# Patient Record
Sex: MALE | Race: WHITE | Employment: OTHER | URBAN - METROPOLITAN AREA
[De-identification: names, ages, dates, MRNs, and addresses within clinical notes are randomized per-mention and may not be internally consistent; named-entity substitution may affect disease eponyms.]

---

## 2019-06-25 LAB
ALBUMIN: 4.1 G/DL (ref 3.4–5)
ALP BLD-CCNC: 77 U/L (ref 33–136)
ALT SERPL-CCNC: 36 U/L (ref 10–52)
ANION GAP SERPL CALCULATED.3IONS-SCNC: 11 MMOL/L (ref 10–20)
AST SERPL-CCNC: 32 U/L (ref 9–39)
BICARBONATE: 29 MMOL/L (ref 21–32)
BILIRUB SERPL-MCNC: 0.8 MG/DL (ref 0–1.2)
CALCIUM SERPL-MCNC: 9.3 MG/DL (ref 8.6–10.3)
CHLORIDE BLD-SCNC: 104 MMOL/L (ref 98–107)
CHOLESTEROL/HDL RATIO: 2.5
CHOLESTEROL: 125 MG/DL (ref 0–199)
CREAT SERPL-MCNC: 1.26 MG/DL (ref 0.5–1.3)
GFR AFRICAN AMERICAN: 68 ML/MIN/1.73M2
GFR NON-AFRICAN AMERICAN: 56 ML/MIN/1.73M2
GLUCOSE: 109 MG/DL (ref 74–99)
HDLC SERPL-MCNC: 50 MG/DL
LDL CHOLESTEROL: 50 MG/DL (ref 0–99)
POTASSIUM SERPL-SCNC: 4.7 MMOL/L (ref 3.5–5.3)
SODIUM BLD-SCNC: 139 MMOL/L (ref 136–145)
TOTAL PROTEIN: 6.3 G/DL (ref 6.4–8.2)
TRIGL SERPL-MCNC: 127 MG/DL (ref 0–149)
UREA NITROGEN: 20 MG/DL (ref 6–23)
VLDLC SERPL CALC-MCNC: 25 MG/DL (ref 0–40)

## 2019-08-14 ENCOUNTER — OFFICE VISIT (OUTPATIENT)
Dept: FAMILY MEDICINE CLINIC | Age: 74
End: 2019-08-14
Payer: MEDICARE

## 2019-08-14 VITALS
SYSTOLIC BLOOD PRESSURE: 138 MMHG | RESPIRATION RATE: 16 BRPM | HEART RATE: 60 BPM | TEMPERATURE: 98.1 F | HEIGHT: 64 IN | BODY MASS INDEX: 34.15 KG/M2 | DIASTOLIC BLOOD PRESSURE: 78 MMHG | OXYGEN SATURATION: 98 % | WEIGHT: 200 LBS

## 2019-08-14 DIAGNOSIS — Z87.891 H/O NICOTINE DEPENDENCE: Chronic | ICD-10-CM

## 2019-08-14 DIAGNOSIS — I10 ESSENTIAL HYPERTENSION: Chronic | ICD-10-CM

## 2019-08-14 DIAGNOSIS — K22.719 BARRETT'S ESOPHAGUS WITH DYSPLASIA: Chronic | ICD-10-CM

## 2019-08-14 DIAGNOSIS — G89.29 CHRONIC RIGHT HIP PAIN: Chronic | ICD-10-CM

## 2019-08-14 DIAGNOSIS — F10.10 ALCOHOL USE DISORDER, MILD, ABUSE: Chronic | ICD-10-CM

## 2019-08-14 DIAGNOSIS — R74.8 ABNORMAL TRANSAMINASES: ICD-10-CM

## 2019-08-14 DIAGNOSIS — M25.551 CHRONIC RIGHT HIP PAIN: Chronic | ICD-10-CM

## 2019-08-14 DIAGNOSIS — Z85.46 H/O PROSTATE CANCER: Chronic | ICD-10-CM

## 2019-08-14 DIAGNOSIS — I25.10 CORONARY ARTERY DISEASE INVOLVING NATIVE CORONARY ARTERY OF NATIVE HEART WITHOUT ANGINA PECTORIS: Primary | Chronic | ICD-10-CM

## 2019-08-14 PROBLEM — K22.70 BARRETT ESOPHAGUS: Chronic | Status: ACTIVE | Noted: 2019-08-14

## 2019-08-14 PROCEDURE — 99214 OFFICE O/P EST MOD 30 MIN: CPT | Performed by: FAMILY MEDICINE

## 2019-08-14 RX ORDER — NITROGLYCERIN 0.4 MG/1
0.4 TABLET SUBLINGUAL EVERY 5 MIN PRN
COMMUNITY

## 2019-08-14 RX ORDER — LIDOCAINE 50 MG/G
1 PATCH TOPICAL DAILY
Qty: 90 PATCH | Refills: 1 | Status: SHIPPED | OUTPATIENT
Start: 2019-08-14 | End: 2020-08-07

## 2019-08-14 RX ORDER — LIDOCAINE 50 MG/G
1 PATCH TOPICAL DAILY
COMMUNITY
End: 2019-08-14 | Stop reason: SDUPTHER

## 2019-08-14 RX ORDER — CLOPIDOGREL BISULFATE 75 MG/1
75 TABLET ORAL DAILY
COMMUNITY
Start: 2019-07-23 | End: 2022-01-26

## 2019-08-14 RX ORDER — ATENOLOL 50 MG/1
50 TABLET ORAL NIGHTLY
COMMUNITY
Start: 2019-06-04 | End: 2022-06-20 | Stop reason: SDUPTHER

## 2019-08-14 RX ORDER — ROSUVASTATIN CALCIUM 20 MG/1
20 TABLET, COATED ORAL NIGHTLY
COMMUNITY
Start: 2019-07-08 | End: 2022-06-20 | Stop reason: SDUPTHER

## 2019-08-14 RX ORDER — CALCIUM CARBONATE/VITAMIN D3 600 MG-10
TABLET ORAL
COMMUNITY
End: 2019-08-14 | Stop reason: ALTCHOICE

## 2019-08-14 RX ORDER — BIOTIN 10 MG
TABLET ORAL DAILY
COMMUNITY
End: 2022-01-26

## 2019-08-14 RX ORDER — ISOSORBIDE MONONITRATE 60 MG/1
60 TABLET, EXTENDED RELEASE ORAL DAILY
Status: ON HOLD | COMMUNITY
Start: 2019-06-20 | End: 2019-12-02 | Stop reason: SDUPTHER

## 2019-08-14 RX ORDER — MULTIVITAMIN
TABLET ORAL
COMMUNITY
End: 2019-08-14 | Stop reason: ALTCHOICE

## 2019-08-14 RX ORDER — MELATONIN
DAILY
COMMUNITY

## 2019-08-14 RX ORDER — OMEPRAZOLE 20 MG/1
20 CAPSULE, DELAYED RELEASE ORAL DAILY
COMMUNITY
Start: 2019-07-08 | End: 2020-02-13 | Stop reason: ALTCHOICE

## 2019-08-14 NOTE — PROGRESS NOTES
Subjective  Piyush Bruce, 69 y.o. male presents today with:  Chief Complaint   Patient presents with   Lazara Petersen     Former Dr Nino Hernandez.  Hypertension     denies chest pain and headache.  Gastroesophageal Reflux    Hip Pain     right. pt states chronic hip pain. uses lidocaine patches and requesting refills. HPI    This is a new patient to me. I have reviewed the past medical and surgical history, social history and family history provided. I have reviewed  medication, previous testing and working diagnoses. I have reviewed the allergies and health maintenance information and correlated it into my decision making for this patient for care, diagnostics, consultations and treatment for today's visit. Gets his care one a year at the [facility] and sees PCP once a year. Also has cardiologist.    Patient is here for f/u HTN and CAD Has 8 stents, 7 of which are in the RCA. Is compliant with meds and has no side effects from them. Avoids added salt. Tries to eat healthy. Exercises occasionally. Has no chest pain, shortness of breath, palpitations or edema. Is on atenolol. Had been on Lisinopril which he stopped because he believed he was on too many medications. Did not have SEs from it. WIfe notes that his balance is off when he first gets up from a chair or the bed. No dizziness. Prostate CA s/p prostatectomy 2003. PSA is increasing and he is back on hormone therapy. Currently asx. Shaw's esophagus. On daily PPI. Due for EGD and colonoscopy in 4 years. Last EGD was within the last year. Sees Dr. Altagracia Bethea regularly. Chronic right hip pain related to possible OA. Uses lidocaine patch. Drinks 12 beers and shots of liquor on a weekend day frequently. Also drinks but less during the week. Transaminases mildly elevated in June      No other questions and or concerns for today's visit      Review of Systems No fevers, chills, sweats. No unintended weight loss.   No abdominal pain, Vomiting     Current Outpatient Medications   Medication Sig Dispense Refill    atenolol (TENORMIN) 50 MG tablet       clopidogrel (PLAVIX) 75 MG tablet       isosorbide mononitrate (IMDUR) 60 MG extended release tablet       omeprazole (PRILOSEC) 20 MG delayed release capsule       rosuvastatin (CRESTOR) 20 MG tablet       nitroGLYCERIN (NITROSTAT) 0.4 MG SL tablet Place 0.4 mg under the tongue every 5 minutes as needed for Chest pain up to max of 3 total doses. If no relief after 1 dose, call 911.  aspirin 81 MG tablet Take 81 mg by mouth daily      lidocaine (LIDODERM) 5 % Place 1 patch onto the skin daily 12 hours on, 12 hours off. 90 patch 1    Cholecalciferol (VITAMIN D3) 1000 units TABS       FIBER COMPLETE PO       Cyanocobalamin (VITAMIN B-12) 3000 MCG SUBL Place under the tongue       No current facility-administered medications for this visit. PMH, Surgical Hx, Family Hx, and Social Hxreviewed and updated. Health Maintenance reviewed. Objective    Vitals:    08/14/19 0715   BP: 138/78   Pulse: 60   Resp: 16   Temp: 98.1 °F (36.7 °C)   SpO2: 98%   Weight: 200 lb (90.7 kg)   Height: 5' 4\" (1.626 m)        Physical Exam   Constitutional: He is oriented to person, place, and time. He appears well-developed and well-nourished. HENT:   Head: Normocephalic and atraumatic. Eyes: Conjunctivae are normal. No scleral icterus. Neck: Normal range of motion. Neck supple. Carotid bruit is not present. No thyromegaly present. Cardiovascular: Normal rate, regular rhythm, S1 normal, S2 normal and normal heart sounds. Pulmonary/Chest: Effort normal and breath sounds normal. He has no wheezes. He has no rales. Abdominal: Soft. Bowel sounds are normal. He exhibits no distension and no mass. There is no tenderness. There is no rebound and no guarding. Musculoskeletal: He exhibits no edema. Neurological: He is alert and oriented to person, place, and time. Skin: Skin is warm and dry. Psychiatric: He has a normal mood and affect. No results found for: LABA1C  Lab Results   Component Value Date    CREATININE 1.26 06/25/2019     Lab Results   Component Value Date    ALT 36 06/25/2019    AST 32 06/25/2019     Lab Results   Component Value Date    CHOL 125 06/25/2019    TRIG 127 06/25/2019    HDL 50.0 06/25/2019    LDLCALC 55 08/21/2012        Assessment & Plan   Visit Diagnoses and Associated Orders     Coronary artery disease involving native coronary artery of native heart without angina pectoris    -  Primary    Stable. Followed by Dr. Ilia Castro.  Encouraged patient to maintain compliance with medications and appointments. Chronic right hip pain        PRN lidocaine patches    lidocaine (LIDODERM) 5 % [57910]           H/O prostate cancer        PSA increasing. On hormonal therapy again. Followed by urology. Essential hypertension        Stable, well-controlled on current meds. Consider changing atenolol to lisinopril. TSH Without Reflex [[id number] Custom]   - Future Order         Shaw's esophagus with dysplasia        Patient has routine follow-up with Dr. Emilio Bell. Continue daily PPI         Abnormal transaminases        Normal in February elevated in June. Acknowledges significant alcohol use on weekends and occasionally during the week. Comprehensive Metabolic Panel [[id number] Custom]   - Future Order         Alcohol use disorder, mild, abuse        Advised moderation of alcohol consumption.          H/O nicotine dependence        CT lung screen [Initial/Annual] [ Custom]   - Future Order         ORDERS WITHOUT AN ASSOCIATED DIAGNOSIS    atenolol (TENORMIN) 50 MG tablet [718]      clopidogrel (PLAVIX) 75 MG tablet [28720]      isosorbide mononitrate (IMDUR) 60 MG extended release tablet [72009]      omeprazole (PRILOSEC) 20 MG delayed release capsule [15482]      rosuvastatin (CRESTOR) 20 MG tablet [14561]      nitroGLYCERIN (NITROSTAT) 0.4 MG SL tablet [0204]

## 2019-08-22 ENCOUNTER — TELEPHONE (OUTPATIENT)
Dept: CASE MANAGEMENT | Age: 74
End: 2019-08-22

## 2019-08-26 ENCOUNTER — HOSPITAL ENCOUNTER (OUTPATIENT)
Dept: CT IMAGING | Age: 74
Discharge: HOME | End: 2019-08-28
Payer: MEDICARE

## 2019-08-26 VITALS — BODY MASS INDEX: 33.29 KG/M2 | HEIGHT: 64 IN | WEIGHT: 195 LBS

## 2019-08-26 DIAGNOSIS — Z87.891 H/O NICOTINE DEPENDENCE: Chronic | ICD-10-CM

## 2019-08-26 PROCEDURE — G0297 LDCT FOR LUNG CA SCREEN: HCPCS

## 2019-10-07 ENCOUNTER — OFFICE VISIT (OUTPATIENT)
Dept: FAMILY MEDICINE CLINIC | Age: 74
End: 2019-10-07
Payer: MEDICARE

## 2019-10-07 VITALS
WEIGHT: 205 LBS | DIASTOLIC BLOOD PRESSURE: 84 MMHG | HEART RATE: 71 BPM | TEMPERATURE: 98.6 F | RESPIRATION RATE: 16 BRPM | OXYGEN SATURATION: 97 % | SYSTOLIC BLOOD PRESSURE: 136 MMHG | HEIGHT: 64 IN | BODY MASS INDEX: 35 KG/M2

## 2019-10-07 DIAGNOSIS — G47.62 NOCTURNAL LEG CRAMPS: Primary | ICD-10-CM

## 2019-10-07 DIAGNOSIS — G47.62 NOCTURNAL LEG CRAMPS: ICD-10-CM

## 2019-10-07 LAB
ANION GAP SERPL CALCULATED.3IONS-SCNC: 10 MEQ/L (ref 9–15)
BUN BLDV-MCNC: 21 MG/DL (ref 8–23)
CALCIUM SERPL-MCNC: 9.6 MG/DL (ref 8.5–9.9)
CHLORIDE BLD-SCNC: 104 MEQ/L (ref 95–107)
CO2: 29 MEQ/L (ref 20–31)
CREAT SERPL-MCNC: 1.19 MG/DL (ref 0.7–1.2)
GFR AFRICAN AMERICAN: >60
GFR NON-AFRICAN AMERICAN: 60
GLUCOSE BLD-MCNC: 109 MG/DL (ref 70–99)
MAGNESIUM: 2.1 MG/DL (ref 1.7–2.4)
POTASSIUM SERPL-SCNC: 4.3 MEQ/L (ref 3.4–4.9)
SODIUM BLD-SCNC: 143 MEQ/L (ref 135–144)

## 2019-10-07 PROCEDURE — 1123F ACP DISCUSS/DSCN MKR DOCD: CPT | Performed by: FAMILY MEDICINE

## 2019-10-07 PROCEDURE — G8598 ASA/ANTIPLAT THER USED: HCPCS | Performed by: FAMILY MEDICINE

## 2019-10-07 PROCEDURE — 99213 OFFICE O/P EST LOW 20 MIN: CPT | Performed by: FAMILY MEDICINE

## 2019-10-07 PROCEDURE — G8427 DOCREV CUR MEDS BY ELIG CLIN: HCPCS | Performed by: FAMILY MEDICINE

## 2019-10-07 PROCEDURE — 1036F TOBACCO NON-USER: CPT | Performed by: FAMILY MEDICINE

## 2019-10-07 PROCEDURE — G0008 ADMIN INFLUENZA VIRUS VAC: HCPCS | Performed by: FAMILY MEDICINE

## 2019-10-07 PROCEDURE — 90653 IIV ADJUVANT VACCINE IM: CPT | Performed by: FAMILY MEDICINE

## 2019-10-07 PROCEDURE — 3017F COLORECTAL CA SCREEN DOC REV: CPT | Performed by: FAMILY MEDICINE

## 2019-10-07 PROCEDURE — G8482 FLU IMMUNIZE ORDER/ADMIN: HCPCS | Performed by: FAMILY MEDICINE

## 2019-10-07 PROCEDURE — 4040F PNEUMOC VAC/ADMIN/RCVD: CPT | Performed by: FAMILY MEDICINE

## 2019-10-07 PROCEDURE — G8417 CALC BMI ABV UP PARAM F/U: HCPCS | Performed by: FAMILY MEDICINE

## 2019-10-07 ASSESSMENT — PATIENT HEALTH QUESTIONNAIRE - PHQ9
SUM OF ALL RESPONSES TO PHQ QUESTIONS 1-9: 0
2. FEELING DOWN, DEPRESSED OR HOPELESS: 0
SUM OF ALL RESPONSES TO PHQ9 QUESTIONS 1 & 2: 0
SUM OF ALL RESPONSES TO PHQ QUESTIONS 1-9: 0
1. LITTLE INTEREST OR PLEASURE IN DOING THINGS: 0

## 2019-12-01 ENCOUNTER — HOSPITAL ENCOUNTER (OUTPATIENT)
Age: 74
Setting detail: OBSERVATION
Discharge: HOME | End: 2019-12-03
Attending: EMERGENCY MEDICINE | Admitting: INTERNAL MEDICINE
Payer: MEDICARE

## 2019-12-01 ENCOUNTER — APPOINTMENT (OUTPATIENT)
Dept: GENERAL RADIOLOGY | Age: 74
End: 2019-12-01
Payer: MEDICARE

## 2019-12-01 DIAGNOSIS — R07.9 CHEST PAIN, UNSPECIFIED TYPE: Primary | ICD-10-CM

## 2019-12-01 LAB
ALBUMIN SERPL-MCNC: 4.2 G/DL (ref 3.5–4.6)
ALP BLD-CCNC: 75 U/L (ref 35–104)
ALT SERPL-CCNC: 44 U/L (ref 0–41)
ANION GAP SERPL CALCULATED.3IONS-SCNC: 9 MEQ/L (ref 9–15)
AST SERPL-CCNC: 37 U/L (ref 0–40)
BASOPHILS ABSOLUTE: 0.1 K/UL (ref 0–0.2)
BASOPHILS RELATIVE PERCENT: 1.1 %
BILIRUB SERPL-MCNC: 0.6 MG/DL (ref 0.2–0.7)
BUN BLDV-MCNC: 19 MG/DL (ref 8–23)
CALCIUM SERPL-MCNC: 9.3 MG/DL (ref 8.5–9.9)
CHLORIDE BLD-SCNC: 102 MEQ/L (ref 95–107)
CO2: 28 MEQ/L (ref 20–31)
CREAT SERPL-MCNC: 1.2 MG/DL (ref 0.7–1.2)
EOSINOPHILS ABSOLUTE: 0.1 K/UL (ref 0–0.7)
EOSINOPHILS RELATIVE PERCENT: 1.7 %
GFR AFRICAN AMERICAN: >60
GFR NON-AFRICAN AMERICAN: 59.4
GLOBULIN: 2.1 G/DL (ref 2.3–3.5)
GLUCOSE BLD-MCNC: 110 MG/DL (ref 70–99)
HCT VFR BLD CALC: 40.4 % (ref 42–52)
HEMOGLOBIN: 14 G/DL (ref 14–18)
LYMPHOCYTES ABSOLUTE: 0.8 K/UL (ref 1–4.8)
LYMPHOCYTES RELATIVE PERCENT: 16.7 %
MAGNESIUM: 2.1 MG/DL (ref 1.7–2.4)
MCH RBC QN AUTO: 31.5 PG (ref 27–31.3)
MCHC RBC AUTO-ENTMCNC: 34.6 % (ref 33–37)
MCV RBC AUTO: 90.8 FL (ref 80–100)
MONOCYTES ABSOLUTE: 0.4 K/UL (ref 0.2–0.8)
MONOCYTES RELATIVE PERCENT: 9.8 %
NEUTROPHILS ABSOLUTE: 3.2 K/UL (ref 1.4–6.5)
NEUTROPHILS RELATIVE PERCENT: 70.7 %
PDW BLD-RTO: 13.4 % (ref 11.5–14.5)
PLATELET # BLD: 115 K/UL (ref 130–400)
POTASSIUM SERPL-SCNC: 4.3 MEQ/L (ref 3.4–4.9)
RBC # BLD: 4.44 M/UL (ref 4.7–6.1)
SODIUM BLD-SCNC: 139 MEQ/L (ref 135–144)
TOTAL PROTEIN: 6.3 G/DL (ref 6.3–8)
TROPONIN: <0.01 NG/ML (ref 0–0.01)
WBC # BLD: 4.6 K/UL (ref 4.8–10.8)

## 2019-12-01 PROCEDURE — 96372 THER/PROPH/DIAG INJ SC/IM: CPT

## 2019-12-01 PROCEDURE — 6370000000 HC RX 637 (ALT 250 FOR IP): Performed by: INTERNAL MEDICINE

## 2019-12-01 PROCEDURE — 36415 COLL VENOUS BLD VENIPUNCTURE: CPT

## 2019-12-01 PROCEDURE — 2580000003 HC RX 258: Performed by: EMERGENCY MEDICINE

## 2019-12-01 PROCEDURE — 6360000002 HC RX W HCPCS: Performed by: NURSE PRACTITIONER

## 2019-12-01 PROCEDURE — 85025 COMPLETE CBC W/AUTO DIFF WBC: CPT

## 2019-12-01 PROCEDURE — 83735 ASSAY OF MAGNESIUM: CPT

## 2019-12-01 PROCEDURE — 2580000003 HC RX 258: Performed by: NURSE PRACTITIONER

## 2019-12-01 PROCEDURE — 93005 ELECTROCARDIOGRAM TRACING: CPT | Performed by: EMERGENCY MEDICINE

## 2019-12-01 PROCEDURE — 99285 EMERGENCY DEPT VISIT HI MDM: CPT

## 2019-12-01 PROCEDURE — 80053 COMPREHEN METABOLIC PANEL: CPT

## 2019-12-01 PROCEDURE — 71046 X-RAY EXAM CHEST 2 VIEWS: CPT

## 2019-12-01 PROCEDURE — G0378 HOSPITAL OBSERVATION PER HR: HCPCS

## 2019-12-01 PROCEDURE — 84484 ASSAY OF TROPONIN QUANT: CPT

## 2019-12-01 PROCEDURE — 6370000000 HC RX 637 (ALT 250 FOR IP): Performed by: NURSE PRACTITIONER

## 2019-12-01 RX ORDER — NITROGLYCERIN 0.4 MG/1
0.4 TABLET SUBLINGUAL EVERY 5 MIN PRN
Status: DISCONTINUED | OUTPATIENT
Start: 2019-12-01 | End: 2019-12-03 | Stop reason: HOSPADM

## 2019-12-01 RX ORDER — LORAZEPAM 2 MG/ML
2 INJECTION INTRAMUSCULAR
Status: DISCONTINUED | OUTPATIENT
Start: 2019-12-01 | End: 2019-12-01

## 2019-12-01 RX ORDER — SODIUM CHLORIDE 0.9 % (FLUSH) 0.9 %
10 SYRINGE (ML) INJECTION PRN
Status: DISCONTINUED | OUTPATIENT
Start: 2019-12-01 | End: 2019-12-03 | Stop reason: HOSPADM

## 2019-12-01 RX ORDER — LORAZEPAM 1 MG/1
3 TABLET ORAL
Status: DISCONTINUED | OUTPATIENT
Start: 2019-12-01 | End: 2019-12-01

## 2019-12-01 RX ORDER — LORAZEPAM 2 MG/ML
4 INJECTION INTRAMUSCULAR
Status: DISCONTINUED | OUTPATIENT
Start: 2019-12-01 | End: 2019-12-01

## 2019-12-01 RX ORDER — LORAZEPAM 1 MG/1
2 TABLET ORAL
Status: DISCONTINUED | OUTPATIENT
Start: 2019-12-01 | End: 2019-12-01

## 2019-12-01 RX ORDER — ROSUVASTATIN CALCIUM 20 MG/1
20 TABLET, COATED ORAL NIGHTLY
Status: DISCONTINUED | OUTPATIENT
Start: 2019-12-01 | End: 2019-12-03 | Stop reason: HOSPADM

## 2019-12-01 RX ORDER — LORAZEPAM 1 MG/1
1 TABLET ORAL
Status: DISCONTINUED | OUTPATIENT
Start: 2019-12-01 | End: 2019-12-01

## 2019-12-01 RX ORDER — ISOSORBIDE MONONITRATE 60 MG/1
60 TABLET, EXTENDED RELEASE ORAL DAILY
Status: DISCONTINUED | OUTPATIENT
Start: 2019-12-01 | End: 2019-12-02

## 2019-12-01 RX ORDER — LORAZEPAM 2 MG/ML
1 INJECTION INTRAMUSCULAR
Status: DISCONTINUED | OUTPATIENT
Start: 2019-12-01 | End: 2019-12-01

## 2019-12-01 RX ORDER — PANTOPRAZOLE SODIUM 40 MG/1
40 TABLET, DELAYED RELEASE ORAL
Status: DISCONTINUED | OUTPATIENT
Start: 2019-12-02 | End: 2019-12-03 | Stop reason: HOSPADM

## 2019-12-01 RX ORDER — SODIUM CHLORIDE 0.9 % (FLUSH) 0.9 %
10 SYRINGE (ML) INJECTION EVERY 12 HOURS SCHEDULED
Status: DISCONTINUED | OUTPATIENT
Start: 2019-12-01 | End: 2019-12-03 | Stop reason: HOSPADM

## 2019-12-01 RX ORDER — LORAZEPAM 1 MG/1
4 TABLET ORAL
Status: DISCONTINUED | OUTPATIENT
Start: 2019-12-01 | End: 2019-12-01

## 2019-12-01 RX ORDER — 0.9 % SODIUM CHLORIDE 0.9 %
1000 INTRAVENOUS SOLUTION INTRAVENOUS ONCE
Status: COMPLETED | OUTPATIENT
Start: 2019-12-01 | End: 2019-12-01

## 2019-12-01 RX ORDER — CLOPIDOGREL BISULFATE 75 MG/1
75 TABLET ORAL DAILY
Status: DISCONTINUED | OUTPATIENT
Start: 2019-12-01 | End: 2019-12-03 | Stop reason: HOSPADM

## 2019-12-01 RX ORDER — LORAZEPAM 2 MG/ML
3 INJECTION INTRAMUSCULAR
Status: DISCONTINUED | OUTPATIENT
Start: 2019-12-01 | End: 2019-12-01

## 2019-12-01 RX ORDER — ASPIRIN 81 MG/1
81 TABLET, CHEWABLE ORAL DAILY
Status: DISCONTINUED | OUTPATIENT
Start: 2019-12-02 | End: 2019-12-02 | Stop reason: SDUPTHER

## 2019-12-01 RX ORDER — ATORVASTATIN CALCIUM 40 MG/1
40 TABLET, FILM COATED ORAL NIGHTLY
Status: DISCONTINUED | OUTPATIENT
Start: 2019-12-01 | End: 2019-12-01 | Stop reason: SDUPTHER

## 2019-12-01 RX ADMIN — SODIUM CHLORIDE 1000 ML: 9 INJECTION, SOLUTION INTRAVENOUS at 10:07

## 2019-12-01 RX ADMIN — NITROGLYCERIN 1 INCH: 20 OINTMENT TOPICAL at 20:28

## 2019-12-01 RX ADMIN — ENOXAPARIN SODIUM 40 MG: 40 INJECTION SUBCUTANEOUS at 13:56

## 2019-12-01 RX ADMIN — Medication 10 ML: at 13:57

## 2019-12-01 RX ADMIN — ROSUVASTATIN CALCIUM 20 MG: 20 TABLET, FILM COATED ORAL at 20:28

## 2019-12-01 RX ADMIN — Medication 10 ML: at 20:30

## 2019-12-01 ASSESSMENT — ENCOUNTER SYMPTOMS
COUGH: 0
DIARRHEA: 0
VOMITING: 0
SHORTNESS OF BREATH: 0
ABDOMINAL PAIN: 0
NAUSEA: 1
BACK PAIN: 0
SORE THROAT: 0

## 2019-12-01 ASSESSMENT — PAIN SCALES - GENERAL
PAINLEVEL_OUTOF10: 0

## 2019-12-02 ENCOUNTER — TELEPHONE (OUTPATIENT)
Dept: FAMILY MEDICINE CLINIC | Age: 74
End: 2019-12-02

## 2019-12-02 ENCOUNTER — APPOINTMENT (OUTPATIENT)
Dept: CARDIAC CATH/INVASIVE PROCEDURES | Age: 74
End: 2019-12-02
Payer: MEDICARE

## 2019-12-02 PROBLEM — I20.0 UNSTABLE ANGINA (HCC): Status: ACTIVE | Noted: 2019-12-02

## 2019-12-02 LAB
ALBUMIN SERPL-MCNC: 4.3 G/DL (ref 3.5–4.6)
ALP BLD-CCNC: 75 U/L (ref 35–104)
ALT SERPL-CCNC: 48 U/L (ref 0–41)
ANION GAP SERPL CALCULATED.3IONS-SCNC: 13 MEQ/L (ref 9–15)
ANISOCYTOSIS: ABNORMAL
AST SERPL-CCNC: 44 U/L (ref 0–40)
ATYPICAL LYMPHOCYTE RELATIVE PERCENT: 4 %
BANDED NEUTROPHILS RELATIVE PERCENT: 4 %
BASOPHILS ABSOLUTE: 0 K/UL (ref 0–0.2)
BASOPHILS RELATIVE PERCENT: 1.5 %
BILIRUB SERPL-MCNC: 0.7 MG/DL (ref 0.2–0.7)
BUN BLDV-MCNC: 17 MG/DL (ref 8–23)
CALCIUM SERPL-MCNC: 9.7 MG/DL (ref 8.5–9.9)
CHLORIDE BLD-SCNC: 105 MEQ/L (ref 95–107)
CO2: 23 MEQ/L (ref 20–31)
CREAT SERPL-MCNC: 0.96 MG/DL (ref 0.7–1.2)
EKG ATRIAL RATE: 58 BPM
EKG ATRIAL RATE: 81 BPM
EKG P AXIS: 34 DEGREES
EKG P AXIS: 44 DEGREES
EKG P-R INTERVAL: 134 MS
EKG P-R INTERVAL: 144 MS
EKG Q-T INTERVAL: 434 MS
EKG Q-T INTERVAL: 438 MS
EKG QRS DURATION: 88 MS
EKG QRS DURATION: 92 MS
EKG QTC CALCULATION (BAZETT): 429 MS
EKG QTC CALCULATION (BAZETT): 504 MS
EKG R AXIS: -3 DEGREES
EKG R AXIS: -5 DEGREES
EKG T AXIS: 20 DEGREES
EKG T AXIS: 40 DEGREES
EKG VENTRICULAR RATE: 58 BPM
EKG VENTRICULAR RATE: 81 BPM
EOSINOPHILS ABSOLUTE: 0.1 K/UL (ref 0–0.7)
EOSINOPHILS RELATIVE PERCENT: 3 %
GFR AFRICAN AMERICAN: >60
GFR NON-AFRICAN AMERICAN: >60
GLOBULIN: 2.2 G/DL (ref 2.3–3.5)
GLUCOSE BLD-MCNC: 117 MG/DL (ref 70–99)
HCT VFR BLD CALC: 43 % (ref 42–52)
HEMOGLOBIN: 14.8 G/DL (ref 14–18)
LYMPHOCYTES ABSOLUTE: 0.8 K/UL (ref 1–4.8)
LYMPHOCYTES RELATIVE PERCENT: 19 %
MCH RBC QN AUTO: 31.5 PG (ref 27–31.3)
MCHC RBC AUTO-ENTMCNC: 34.3 % (ref 33–37)
MCV RBC AUTO: 92 FL (ref 80–100)
METAMYELOCYTES RELATIVE PERCENT: 1 %
MONOCYTES ABSOLUTE: 0.1 K/UL (ref 0.2–0.8)
MONOCYTES RELATIVE PERCENT: 3.9 %
NEUTROPHILS ABSOLUTE: 2.4 K/UL (ref 1.4–6.5)
NEUTROPHILS RELATIVE PERCENT: 65 %
PDW BLD-RTO: 13.4 % (ref 11.5–14.5)
PLATELET # BLD: 107 K/UL (ref 130–400)
PLATELET SLIDE REVIEW: ABNORMAL
POTASSIUM REFLEX MAGNESIUM: 4 MEQ/L (ref 3.4–4.9)
RBC # BLD: 4.68 M/UL (ref 4.7–6.1)
SLIDE REVIEW: ABNORMAL
SODIUM BLD-SCNC: 141 MEQ/L (ref 135–144)
TOTAL PROTEIN: 6.5 G/DL (ref 6.3–8)
WBC # BLD: 3.4 K/UL (ref 4.8–10.8)

## 2019-12-02 PROCEDURE — 96372 THER/PROPH/DIAG INJ SC/IM: CPT

## 2019-12-02 PROCEDURE — 80053 COMPREHEN METABOLIC PANEL: CPT

## 2019-12-02 PROCEDURE — G0378 HOSPITAL OBSERVATION PER HR: HCPCS

## 2019-12-02 PROCEDURE — 6370000000 HC RX 637 (ALT 250 FOR IP): Performed by: INTERNAL MEDICINE

## 2019-12-02 PROCEDURE — 6360000002 HC RX W HCPCS: Performed by: INTERNAL MEDICINE

## 2019-12-02 PROCEDURE — 93005 ELECTROCARDIOGRAM TRACING: CPT | Performed by: NURSE PRACTITIONER

## 2019-12-02 PROCEDURE — 6360000002 HC RX W HCPCS

## 2019-12-02 PROCEDURE — 6370000000 HC RX 637 (ALT 250 FOR IP): Performed by: NURSE PRACTITIONER

## 2019-12-02 PROCEDURE — 96374 THER/PROPH/DIAG INJ IV PUSH: CPT

## 2019-12-02 PROCEDURE — 6360000004 HC RX CONTRAST MEDICATION: Performed by: INTERNAL MEDICINE

## 2019-12-02 PROCEDURE — 36415 COLL VENOUS BLD VENIPUNCTURE: CPT

## 2019-12-02 PROCEDURE — C1760 CLOSURE DEV, VASC: HCPCS

## 2019-12-02 PROCEDURE — 2580000003 HC RX 258: Performed by: INTERNAL MEDICINE

## 2019-12-02 PROCEDURE — 2709999900 HC NON-CHARGEABLE SUPPLY

## 2019-12-02 PROCEDURE — C1769 GUIDE WIRE: HCPCS

## 2019-12-02 PROCEDURE — 93458 L HRT ARTERY/VENTRICLE ANGIO: CPT | Performed by: INTERNAL MEDICINE

## 2019-12-02 PROCEDURE — 96375 TX/PRO/DX INJ NEW DRUG ADDON: CPT

## 2019-12-02 PROCEDURE — 2500000003 HC RX 250 WO HCPCS

## 2019-12-02 PROCEDURE — 2580000003 HC RX 258

## 2019-12-02 PROCEDURE — 2580000003 HC RX 258: Performed by: NURSE PRACTITIONER

## 2019-12-02 PROCEDURE — 85025 COMPLETE CBC W/AUTO DIFF WBC: CPT

## 2019-12-02 PROCEDURE — 6360000002 HC RX W HCPCS: Performed by: NURSE PRACTITIONER

## 2019-12-02 PROCEDURE — C1894 INTRO/SHEATH, NON-LASER: HCPCS

## 2019-12-02 RX ORDER — PREDNISONE 50 MG/1
50 TABLET ORAL ONCE
Status: DISCONTINUED | OUTPATIENT
Start: 2019-12-02 | End: 2019-12-03 | Stop reason: HOSPADM

## 2019-12-02 RX ORDER — SODIUM CHLORIDE 0.9 % (FLUSH) 0.9 %
10 SYRINGE (ML) INJECTION PRN
Status: DISCONTINUED | OUTPATIENT
Start: 2019-12-02 | End: 2019-12-03 | Stop reason: HOSPADM

## 2019-12-02 RX ORDER — ISOSORBIDE MONONITRATE 60 MG/1
120 TABLET, EXTENDED RELEASE ORAL DAILY
Status: DISCONTINUED | OUTPATIENT
Start: 2019-12-03 | End: 2019-12-03 | Stop reason: HOSPADM

## 2019-12-02 RX ORDER — HYDRALAZINE HYDROCHLORIDE 50 MG/1
50 TABLET, FILM COATED ORAL ONCE
Status: COMPLETED | OUTPATIENT
Start: 2019-12-02 | End: 2019-12-02

## 2019-12-02 RX ORDER — HYDRALAZINE HYDROCHLORIDE 20 MG/ML
10 INJECTION INTRAMUSCULAR; INTRAVENOUS EVERY 4 HOURS PRN
Status: DISCONTINUED | OUTPATIENT
Start: 2019-12-02 | End: 2019-12-03 | Stop reason: HOSPADM

## 2019-12-02 RX ORDER — DIPHENHYDRAMINE HYDROCHLORIDE 50 MG/ML
50 INJECTION INTRAMUSCULAR; INTRAVENOUS ONCE
Status: COMPLETED | OUTPATIENT
Start: 2019-12-02 | End: 2019-12-02

## 2019-12-02 RX ORDER — ACETAMINOPHEN 325 MG/1
650 TABLET ORAL EVERY 4 HOURS PRN
Status: DISCONTINUED | OUTPATIENT
Start: 2019-12-02 | End: 2019-12-03 | Stop reason: HOSPADM

## 2019-12-02 RX ORDER — ASPIRIN 81 MG/1
81 TABLET, CHEWABLE ORAL DAILY
Status: DISCONTINUED | OUTPATIENT
Start: 2019-12-02 | End: 2019-12-03 | Stop reason: HOSPADM

## 2019-12-02 RX ORDER — SODIUM CHLORIDE 9 MG/ML
INJECTION, SOLUTION INTRAVENOUS CONTINUOUS
Status: DISCONTINUED | OUTPATIENT
Start: 2019-12-02 | End: 2019-12-03 | Stop reason: HOSPADM

## 2019-12-02 RX ORDER — SODIUM CHLORIDE 0.9 % (FLUSH) 0.9 %
10 SYRINGE (ML) INJECTION EVERY 12 HOURS SCHEDULED
Status: DISCONTINUED | OUTPATIENT
Start: 2019-12-02 | End: 2019-12-03 | Stop reason: HOSPADM

## 2019-12-02 RX ORDER — DIAZEPAM 5 MG/1
5 TABLET ORAL
Status: DISCONTINUED | OUTPATIENT
Start: 2019-12-02 | End: 2019-12-03 | Stop reason: HOSPADM

## 2019-12-02 RX ORDER — ATENOLOL 50 MG/1
50 TABLET ORAL NIGHTLY
Status: DISCONTINUED | OUTPATIENT
Start: 2019-12-02 | End: 2019-12-03 | Stop reason: HOSPADM

## 2019-12-02 RX ORDER — DIPHENHYDRAMINE HCL 25 MG
50 TABLET ORAL ONCE
Status: DISCONTINUED | OUTPATIENT
Start: 2019-12-02 | End: 2019-12-03 | Stop reason: HOSPADM

## 2019-12-02 RX ORDER — ACETAMINOPHEN 325 MG/1
650 TABLET ORAL EVERY 4 HOURS PRN
Status: DISCONTINUED | OUTPATIENT
Start: 2019-12-02 | End: 2019-12-02 | Stop reason: SDUPTHER

## 2019-12-02 RX ORDER — ISOSORBIDE MONONITRATE 60 MG/1
120 TABLET, EXTENDED RELEASE ORAL DAILY
Qty: 30 TABLET | Refills: 3 | Status: SHIPPED | OUTPATIENT
Start: 2019-12-02 | End: 2022-04-12 | Stop reason: SDUPTHER

## 2019-12-02 RX ORDER — ONDANSETRON 2 MG/ML
4 INJECTION INTRAMUSCULAR; INTRAVENOUS EVERY 6 HOURS PRN
Status: DISCONTINUED | OUTPATIENT
Start: 2019-12-02 | End: 2019-12-03 | Stop reason: HOSPADM

## 2019-12-02 RX ADMIN — SODIUM CHLORIDE: 9 INJECTION, SOLUTION INTRAVENOUS at 12:28

## 2019-12-02 RX ADMIN — CLOPIDOGREL BISULFATE 75 MG: 75 TABLET ORAL at 08:45

## 2019-12-02 RX ADMIN — ENOXAPARIN SODIUM 40 MG: 40 INJECTION SUBCUTANEOUS at 08:45

## 2019-12-02 RX ADMIN — NITROGLYCERIN 0.4 MG: 0.4 TABLET, ORALLY DISINTEGRATING SUBLINGUAL at 01:03

## 2019-12-02 RX ADMIN — ASPIRIN 81 MG 81 MG: 81 TABLET ORAL at 08:45

## 2019-12-02 RX ADMIN — ROSUVASTATIN CALCIUM 20 MG: 20 TABLET, FILM COATED ORAL at 20:26

## 2019-12-02 RX ADMIN — Medication 10 ML: at 08:45

## 2019-12-02 RX ADMIN — ACETAMINOPHEN 650 MG: 325 TABLET ORAL at 21:19

## 2019-12-02 RX ADMIN — Medication 10 ML: at 12:28

## 2019-12-02 RX ADMIN — ONDANSETRON 4 MG: 2 INJECTION INTRAMUSCULAR; INTRAVENOUS at 13:06

## 2019-12-02 RX ADMIN — PANTOPRAZOLE SODIUM 40 MG: 40 TABLET, DELAYED RELEASE ORAL at 08:47

## 2019-12-02 RX ADMIN — ACETAMINOPHEN 650 MG: 325 TABLET ORAL at 02:09

## 2019-12-02 RX ADMIN — DIPHENHYDRAMINE HYDROCHLORIDE 50 MG: 50 INJECTION, SOLUTION INTRAMUSCULAR; INTRAVENOUS at 13:11

## 2019-12-02 RX ADMIN — NITROGLYCERIN 1 INCH: 20 OINTMENT TOPICAL at 02:09

## 2019-12-02 RX ADMIN — NITROGLYCERIN 1 INCH: 20 OINTMENT TOPICAL at 08:45

## 2019-12-02 RX ADMIN — IOPAMIDOL 80 ML: 612 INJECTION, SOLUTION INTRAVENOUS at 14:00

## 2019-12-02 RX ADMIN — HYDROCORTISONE SODIUM SUCCINATE: 100 INJECTION, POWDER, FOR SOLUTION INTRAMUSCULAR; INTRAVENOUS at 13:30

## 2019-12-02 RX ADMIN — HYDRALAZINE HYDROCHLORIDE 10 MG: 20 INJECTION INTRAMUSCULAR; INTRAVENOUS at 00:24

## 2019-12-02 RX ADMIN — Medication 10 ML: at 21:20

## 2019-12-02 RX ADMIN — HYDRALAZINE HYDROCHLORIDE 50 MG: 50 TABLET, FILM COATED ORAL at 17:13

## 2019-12-02 RX ADMIN — ATENOLOL 50 MG: 50 TABLET ORAL at 17:13

## 2019-12-02 RX ADMIN — NITROGLYCERIN 0.4 MG: 0.4 TABLET, ORALLY DISINTEGRATING SUBLINGUAL at 01:10

## 2019-12-02 ASSESSMENT — ENCOUNTER SYMPTOMS
VOMITING: 0
DIARRHEA: 0
CHEST TIGHTNESS: 1
SHORTNESS OF BREATH: 1
TROUBLE SWALLOWING: 0
COLOR CHANGE: 0
SINUS PAIN: 0
NAUSEA: 0
SORE THROAT: 0
EYE DISCHARGE: 0
CHOKING: 1
ABDOMINAL DISTENTION: 0

## 2019-12-02 ASSESSMENT — PAIN SCALES - GENERAL
PAINLEVEL_OUTOF10: 2
PAINLEVEL_OUTOF10: 0
PAINLEVEL_OUTOF10: 7
PAINLEVEL_OUTOF10: 3
PAINLEVEL_OUTOF10: 0
PAINLEVEL_OUTOF10: 2
PAINLEVEL_OUTOF10: 1

## 2019-12-02 ASSESSMENT — PAIN DESCRIPTION - LOCATION: LOCATION: CHEST

## 2019-12-02 ASSESSMENT — PAIN DESCRIPTION - PAIN TYPE: TYPE: ACUTE PAIN

## 2019-12-02 ASSESSMENT — PAIN DESCRIPTION - DESCRIPTORS: DESCRIPTORS: PRESSURE;HEAVINESS

## 2019-12-03 VITALS
TEMPERATURE: 98.6 F | BODY MASS INDEX: 34.89 KG/M2 | DIASTOLIC BLOOD PRESSURE: 83 MMHG | WEIGHT: 204.4 LBS | RESPIRATION RATE: 18 BRPM | SYSTOLIC BLOOD PRESSURE: 172 MMHG | HEIGHT: 64 IN | HEART RATE: 63 BPM | OXYGEN SATURATION: 96 %

## 2019-12-03 PROCEDURE — 6370000000 HC RX 637 (ALT 250 FOR IP): Performed by: INTERNAL MEDICINE

## 2019-12-03 PROCEDURE — G0378 HOSPITAL OBSERVATION PER HR: HCPCS

## 2019-12-03 PROCEDURE — 2580000003 HC RX 258: Performed by: INTERNAL MEDICINE

## 2019-12-03 RX ADMIN — PANTOPRAZOLE SODIUM 40 MG: 40 TABLET, DELAYED RELEASE ORAL at 10:09

## 2019-12-03 RX ADMIN — ASPIRIN 81 MG 81 MG: 81 TABLET ORAL at 10:09

## 2019-12-03 RX ADMIN — CLOPIDOGREL BISULFATE 75 MG: 75 TABLET ORAL at 10:09

## 2019-12-03 RX ADMIN — ISOSORBIDE MONONITRATE 120 MG: 60 TABLET, EXTENDED RELEASE ORAL at 10:10

## 2019-12-03 RX ADMIN — SODIUM CHLORIDE: 9 INJECTION, SOLUTION INTRAVENOUS at 03:20

## 2019-12-09 LAB
ALBUMIN SERPL-MCNC: 4.3 G/DL (ref 3.5–4.6)
ALP BLD-CCNC: 73 U/L (ref 35–104)
ALT SERPL-CCNC: 58 U/L (ref 0–41)
ANION GAP SERPL CALCULATED.3IONS-SCNC: 12 MEQ/L (ref 9–15)
AST SERPL-CCNC: 49 U/L (ref 0–40)
BILIRUB SERPL-MCNC: 0.6 MG/DL (ref 0.2–0.7)
BUN BLDV-MCNC: 17 MG/DL (ref 8–23)
CALCIUM SERPL-MCNC: 9.4 MG/DL (ref 8.5–9.9)
CHLORIDE BLD-SCNC: 102 MEQ/L (ref 95–107)
CHOLESTEROL, FASTING: 122 MG/DL (ref 0–199)
CO2: 27 MEQ/L (ref 20–31)
CREAT SERPL-MCNC: 0.94 MG/DL (ref 0.7–1.2)
GFR AFRICAN AMERICAN: >60
GFR NON-AFRICAN AMERICAN: >60
GLOBULIN: 2.2 G/DL (ref 2.3–3.5)
GLUCOSE FASTING: 102 MG/DL (ref 70–99)
HDLC SERPL-MCNC: 52 MG/DL (ref 40–59)
LDL CHOLESTEROL CALCULATED: 48 MG/DL (ref 0–129)
POTASSIUM SERPL-SCNC: 4.7 MEQ/L (ref 3.4–4.9)
SODIUM BLD-SCNC: 141 MEQ/L (ref 135–144)
TOTAL PROTEIN: 6.5 G/DL (ref 6.3–8)
TRIGLYCERIDE, FASTING: 108 MG/DL (ref 0–150)

## 2019-12-12 ENCOUNTER — HOSPITAL ENCOUNTER (OUTPATIENT)
Dept: CARDIAC REHAB | Age: 74
Setting detail: THERAPIES SERIES
Discharge: HOME | End: 2019-12-12
Payer: MEDICARE

## 2019-12-12 PROCEDURE — 93798 PHYS/QHP OP CAR RHAB W/ECG: CPT

## 2019-12-13 ENCOUNTER — HOSPITAL ENCOUNTER (OUTPATIENT)
Dept: CARDIAC REHAB | Age: 74
Setting detail: THERAPIES SERIES
Discharge: HOME | End: 2019-12-13
Payer: MEDICARE

## 2019-12-13 PROCEDURE — 93798 PHYS/QHP OP CAR RHAB W/ECG: CPT

## 2019-12-16 ENCOUNTER — HOSPITAL ENCOUNTER (OUTPATIENT)
Dept: CARDIAC REHAB | Age: 74
Setting detail: THERAPIES SERIES
Discharge: HOME | End: 2019-12-16
Payer: MEDICARE

## 2019-12-16 PROCEDURE — 93798 PHYS/QHP OP CAR RHAB W/ECG: CPT

## 2019-12-18 ENCOUNTER — HOSPITAL ENCOUNTER (OUTPATIENT)
Dept: CARDIAC REHAB | Age: 74
Setting detail: THERAPIES SERIES
Discharge: HOME | End: 2019-12-18
Payer: MEDICARE

## 2019-12-18 PROCEDURE — 93798 PHYS/QHP OP CAR RHAB W/ECG: CPT

## 2019-12-20 ENCOUNTER — HOSPITAL ENCOUNTER (OUTPATIENT)
Dept: CARDIAC REHAB | Age: 74
Setting detail: THERAPIES SERIES
Discharge: HOME | End: 2019-12-20
Payer: MEDICARE

## 2019-12-20 PROCEDURE — 93798 PHYS/QHP OP CAR RHAB W/ECG: CPT

## 2019-12-23 ENCOUNTER — HOSPITAL ENCOUNTER (OUTPATIENT)
Dept: CARDIAC REHAB | Age: 74
Setting detail: THERAPIES SERIES
Discharge: HOME | End: 2019-12-23
Payer: MEDICARE

## 2019-12-23 PROCEDURE — 93798 PHYS/QHP OP CAR RHAB W/ECG: CPT

## 2019-12-27 ENCOUNTER — HOSPITAL ENCOUNTER (OUTPATIENT)
Dept: CARDIAC REHAB | Age: 74
Setting detail: THERAPIES SERIES
Discharge: HOME | End: 2019-12-27
Payer: MEDICARE

## 2019-12-27 PROCEDURE — 93798 PHYS/QHP OP CAR RHAB W/ECG: CPT

## 2019-12-30 ENCOUNTER — HOSPITAL ENCOUNTER (OUTPATIENT)
Dept: CARDIAC REHAB | Age: 74
Setting detail: THERAPIES SERIES
Discharge: HOME | End: 2019-12-30
Payer: MEDICARE

## 2019-12-30 PROCEDURE — 93798 PHYS/QHP OP CAR RHAB W/ECG: CPT

## 2020-01-03 ENCOUNTER — HOSPITAL ENCOUNTER (OUTPATIENT)
Dept: CARDIAC REHAB | Age: 75
Setting detail: THERAPIES SERIES
Discharge: HOME | End: 2020-01-03
Payer: MEDICARE

## 2020-01-03 PROCEDURE — 93798 PHYS/QHP OP CAR RHAB W/ECG: CPT

## 2020-01-06 ENCOUNTER — HOSPITAL ENCOUNTER (OUTPATIENT)
Dept: CARDIAC REHAB | Age: 75
Setting detail: THERAPIES SERIES
Discharge: HOME | End: 2020-01-06
Payer: MEDICARE

## 2020-01-06 PROCEDURE — 93798 PHYS/QHP OP CAR RHAB W/ECG: CPT

## 2020-01-08 ENCOUNTER — HOSPITAL ENCOUNTER (OUTPATIENT)
Dept: CARDIAC REHAB | Age: 75
Setting detail: THERAPIES SERIES
Discharge: HOME | End: 2020-01-08
Payer: MEDICARE

## 2020-01-08 PROCEDURE — 93798 PHYS/QHP OP CAR RHAB W/ECG: CPT

## 2020-01-10 ENCOUNTER — HOSPITAL ENCOUNTER (OUTPATIENT)
Dept: CARDIAC REHAB | Age: 75
Setting detail: THERAPIES SERIES
Discharge: HOME | End: 2020-01-10
Payer: MEDICARE

## 2020-01-10 PROCEDURE — 93798 PHYS/QHP OP CAR RHAB W/ECG: CPT

## 2020-01-13 ENCOUNTER — HOSPITAL ENCOUNTER (OUTPATIENT)
Dept: CARDIAC REHAB | Age: 75
Setting detail: THERAPIES SERIES
Discharge: HOME | End: 2020-01-13
Payer: MEDICARE

## 2020-01-13 PROCEDURE — 93798 PHYS/QHP OP CAR RHAB W/ECG: CPT

## 2020-01-15 ENCOUNTER — HOSPITAL ENCOUNTER (OUTPATIENT)
Dept: CARDIAC REHAB | Age: 75
Setting detail: THERAPIES SERIES
Discharge: HOME | End: 2020-01-15
Payer: MEDICARE

## 2020-01-15 PROCEDURE — 93798 PHYS/QHP OP CAR RHAB W/ECG: CPT

## 2020-01-17 ENCOUNTER — HOSPITAL ENCOUNTER (OUTPATIENT)
Dept: CARDIAC REHAB | Age: 75
Setting detail: THERAPIES SERIES
Discharge: HOME | End: 2020-01-17
Payer: MEDICARE

## 2020-01-17 PROCEDURE — 93798 PHYS/QHP OP CAR RHAB W/ECG: CPT

## 2020-01-20 ENCOUNTER — HOSPITAL ENCOUNTER (OUTPATIENT)
Dept: CARDIAC REHAB | Age: 75
Setting detail: THERAPIES SERIES
Discharge: HOME | End: 2020-01-20
Payer: MEDICARE

## 2020-01-20 PROCEDURE — 93798 PHYS/QHP OP CAR RHAB W/ECG: CPT

## 2020-01-21 DIAGNOSIS — I10 ESSENTIAL HYPERTENSION: Chronic | ICD-10-CM

## 2020-01-21 DIAGNOSIS — R74.8 ABNORMAL TRANSAMINASES: ICD-10-CM

## 2020-01-21 LAB
ALBUMIN SERPL-MCNC: 4.4 G/DL (ref 3.5–4.6)
ALP BLD-CCNC: 75 U/L (ref 35–104)
ALT SERPL-CCNC: 52 U/L (ref 0–41)
ANION GAP SERPL CALCULATED.3IONS-SCNC: 15 MEQ/L (ref 9–15)
AST SERPL-CCNC: 39 U/L (ref 0–40)
BILIRUB SERPL-MCNC: 0.9 MG/DL (ref 0.2–0.7)
BUN BLDV-MCNC: 20 MG/DL (ref 8–23)
CALCIUM SERPL-MCNC: 9.6 MG/DL (ref 8.5–9.9)
CHLORIDE BLD-SCNC: 100 MEQ/L (ref 95–107)
CO2: 27 MEQ/L (ref 20–31)
CREAT SERPL-MCNC: 1.28 MG/DL (ref 0.7–1.2)
GFR AFRICAN AMERICAN: >60
GFR NON-AFRICAN AMERICAN: 55.1
GLOBULIN: 2.3 G/DL (ref 2.3–3.5)
GLUCOSE BLD-MCNC: 100 MG/DL (ref 70–99)
POTASSIUM SERPL-SCNC: 4.4 MEQ/L (ref 3.4–4.9)
SODIUM BLD-SCNC: 142 MEQ/L (ref 135–144)
TOTAL PROTEIN: 6.7 G/DL (ref 6.3–8)
TSH SERPL DL<=0.05 MIU/L-ACNC: 1.38 UIU/ML (ref 0.44–3.86)

## 2020-01-22 ENCOUNTER — HOSPITAL ENCOUNTER (OUTPATIENT)
Dept: CARDIAC REHAB | Age: 75
Setting detail: THERAPIES SERIES
Discharge: HOME | End: 2020-01-22
Payer: MEDICARE

## 2020-01-22 PROCEDURE — 93798 PHYS/QHP OP CAR RHAB W/ECG: CPT

## 2020-01-24 ENCOUNTER — HOSPITAL ENCOUNTER (OUTPATIENT)
Dept: CARDIAC REHAB | Age: 75
Setting detail: THERAPIES SERIES
Discharge: HOME | End: 2020-01-24
Payer: MEDICARE

## 2020-01-24 PROCEDURE — 93798 PHYS/QHP OP CAR RHAB W/ECG: CPT

## 2020-01-27 ENCOUNTER — HOSPITAL ENCOUNTER (OUTPATIENT)
Dept: CARDIAC REHAB | Age: 75
Setting detail: THERAPIES SERIES
Discharge: HOME | End: 2020-01-27
Payer: MEDICARE

## 2020-01-27 PROCEDURE — 93798 PHYS/QHP OP CAR RHAB W/ECG: CPT

## 2020-01-29 ENCOUNTER — HOSPITAL ENCOUNTER (OUTPATIENT)
Dept: CARDIAC REHAB | Age: 75
Setting detail: THERAPIES SERIES
Discharge: HOME | End: 2020-01-29
Payer: MEDICARE

## 2020-01-29 PROCEDURE — 93798 PHYS/QHP OP CAR RHAB W/ECG: CPT

## 2020-01-31 ENCOUNTER — HOSPITAL ENCOUNTER (OUTPATIENT)
Dept: CARDIAC REHAB | Age: 75
Setting detail: THERAPIES SERIES
Discharge: HOME | End: 2020-01-31
Payer: MEDICARE

## 2020-01-31 PROCEDURE — 93798 PHYS/QHP OP CAR RHAB W/ECG: CPT

## 2020-02-03 ENCOUNTER — HOSPITAL ENCOUNTER (OUTPATIENT)
Dept: CARDIAC REHAB | Age: 75
Setting detail: THERAPIES SERIES
Discharge: HOME | End: 2020-02-03
Payer: MEDICARE

## 2020-02-03 PROCEDURE — 93798 PHYS/QHP OP CAR RHAB W/ECG: CPT

## 2020-02-05 ENCOUNTER — HOSPITAL ENCOUNTER (OUTPATIENT)
Dept: CARDIAC REHAB | Age: 75
Setting detail: THERAPIES SERIES
Discharge: HOME | End: 2020-02-05
Payer: MEDICARE

## 2020-02-05 PROCEDURE — 93798 PHYS/QHP OP CAR RHAB W/ECG: CPT

## 2020-02-07 ENCOUNTER — HOSPITAL ENCOUNTER (OUTPATIENT)
Dept: CARDIAC REHAB | Age: 75
Setting detail: THERAPIES SERIES
Discharge: HOME | End: 2020-02-07
Payer: MEDICARE

## 2020-02-07 PROCEDURE — 93798 PHYS/QHP OP CAR RHAB W/ECG: CPT

## 2020-02-10 ENCOUNTER — HOSPITAL ENCOUNTER (OUTPATIENT)
Dept: CARDIAC REHAB | Age: 75
Setting detail: THERAPIES SERIES
Discharge: HOME | End: 2020-02-10
Payer: MEDICARE

## 2020-02-10 PROCEDURE — 93798 PHYS/QHP OP CAR RHAB W/ECG: CPT

## 2020-02-12 ENCOUNTER — HOSPITAL ENCOUNTER (OUTPATIENT)
Dept: CARDIAC REHAB | Age: 75
Setting detail: THERAPIES SERIES
Discharge: HOME | End: 2020-02-12
Payer: MEDICARE

## 2020-02-12 PROCEDURE — 93798 PHYS/QHP OP CAR RHAB W/ECG: CPT

## 2020-02-13 ENCOUNTER — OFFICE VISIT (OUTPATIENT)
Dept: FAMILY MEDICINE CLINIC | Age: 75
End: 2020-02-13
Payer: MEDICARE

## 2020-02-13 VITALS
OXYGEN SATURATION: 98 % | WEIGHT: 205.4 LBS | BODY MASS INDEX: 35.07 KG/M2 | TEMPERATURE: 98 F | RESPIRATION RATE: 16 BRPM | DIASTOLIC BLOOD PRESSURE: 82 MMHG | SYSTOLIC BLOOD PRESSURE: 130 MMHG | HEART RATE: 61 BPM | HEIGHT: 64 IN

## 2020-02-13 DIAGNOSIS — R73.09 ABNORMAL GLUCOSE: Chronic | ICD-10-CM

## 2020-02-13 LAB — HBA1C MFR BLD: 5.9 % (ref 4.8–5.9)

## 2020-02-13 PROCEDURE — 4040F PNEUMOC VAC/ADMIN/RCVD: CPT | Performed by: FAMILY MEDICINE

## 2020-02-13 PROCEDURE — G8482 FLU IMMUNIZE ORDER/ADMIN: HCPCS | Performed by: FAMILY MEDICINE

## 2020-02-13 PROCEDURE — 1123F ACP DISCUSS/DSCN MKR DOCD: CPT | Performed by: FAMILY MEDICINE

## 2020-02-13 PROCEDURE — 1036F TOBACCO NON-USER: CPT | Performed by: FAMILY MEDICINE

## 2020-02-13 PROCEDURE — G8417 CALC BMI ABV UP PARAM F/U: HCPCS | Performed by: FAMILY MEDICINE

## 2020-02-13 PROCEDURE — 99214 OFFICE O/P EST MOD 30 MIN: CPT | Performed by: FAMILY MEDICINE

## 2020-02-13 PROCEDURE — 3017F COLORECTAL CA SCREEN DOC REV: CPT | Performed by: FAMILY MEDICINE

## 2020-02-13 PROCEDURE — G8427 DOCREV CUR MEDS BY ELIG CLIN: HCPCS | Performed by: FAMILY MEDICINE

## 2020-02-13 RX ORDER — PANTOPRAZOLE SODIUM 40 MG/1
40 TABLET, DELAYED RELEASE ORAL
COMMUNITY
Start: 2019-12-02 | End: 2020-02-13 | Stop reason: SDUPTHER

## 2020-02-13 RX ORDER — DIAZEPAM 5 MG/1
5 TABLET ORAL
COMMUNITY
Start: 2019-12-02 | End: 2020-02-13

## 2020-02-13 RX ORDER — ONDANSETRON 2 MG/ML
4 INJECTION INTRAMUSCULAR; INTRAVENOUS
COMMUNITY
Start: 2019-12-02 | End: 2020-02-13

## 2020-02-13 RX ORDER — OMEPRAZOLE 20 MG/1
20 CAPSULE, DELAYED RELEASE ORAL DAILY
Status: CANCELLED | OUTPATIENT
Start: 2020-02-13

## 2020-02-13 RX ORDER — LISINOPRIL 10 MG/1
10 TABLET ORAL DAILY
Qty: 90 TABLET | Refills: 4 | Status: SHIPPED | OUTPATIENT
Start: 2020-02-13 | End: 2021-05-10

## 2020-02-13 RX ORDER — PANTOPRAZOLE SODIUM 40 MG/1
40 TABLET, DELAYED RELEASE ORAL DAILY
Qty: 90 TABLET | Refills: 4 | Status: SHIPPED | OUTPATIENT
Start: 2020-02-13 | End: 2021-03-09

## 2020-02-13 ASSESSMENT — PATIENT HEALTH QUESTIONNAIRE - PHQ9
1. LITTLE INTEREST OR PLEASURE IN DOING THINGS: 0
2. FEELING DOWN, DEPRESSED OR HOPELESS: 0
DEPRESSION UNABLE TO ASSESS: PT REFUSES
SUM OF ALL RESPONSES TO PHQ QUESTIONS 1-9: 0
SUM OF ALL RESPONSES TO PHQ9 QUESTIONS 1 & 2: 0
SUM OF ALL RESPONSES TO PHQ QUESTIONS 1-9: 0

## 2020-02-13 NOTE — PROGRESS NOTES
Not on file   Lifestyle    Physical activity:     Days per week: Not on file     Minutes per session: Not on file    Stress: Not on file   Relationships    Social connections:     Talks on phone: Not on file     Gets together: Not on file     Attends Gnosticism service: Not on file     Active member of club or organization: Not on file     Attends meetings of clubs or organizations: Not on file     Relationship status: Not on file    Intimate partner violence:     Fear of current or ex partner: Not on file     Emotionally abused: Not on file     Physically abused: Not on file     Forced sexual activity: Not on file   Other Topics Concern    Not on file   Social History Narrative    Not on file     Family History   Problem Relation Age of Onset    Cancer Father         prostate    Heart Disease Father     Cancer Brother         prostate    Heart Disease Brother     Heart Disease Paternal Uncle      Allergies   Allergen Reactions    Iodides Nausea And Vomiting     Contrast Dye    Oxycodone-Acetaminophen Nausea And Vomiting     Current Outpatient Medications   Medication Sig Dispense Refill    pantoprazole (PROTONIX) 40 MG tablet Take 1 tablet by mouth daily 90 tablet 4    lisinopril (PRINIVIL;ZESTRIL) 10 MG tablet Take 1 tablet by mouth daily 90 tablet 4    psyllium (METAMUCIL SMOOTH TEXTURE) 28 % packet Take 1 packet by mouth 2 times daily Take with full glass of h20 or juice 180 packet 4    isosorbide mononitrate (IMDUR) 60 MG extended release tablet Take 2 tablets by mouth daily 30 tablet 3    Magnesium 400 MG CAPS Take 1 capsule by mouth daily (Patient taking differently: Take 1 capsule by mouth nightly ) 90 capsule 4    atenolol (TENORMIN) 50 MG tablet Take 50 mg by mouth nightly       clopidogrel (PLAVIX) 75 MG tablet Take 75 mg by mouth daily       rosuvastatin (CRESTOR) 20 MG tablet Take 20 mg by mouth nightly       nitroGLYCERIN (NITROSTAT) 0.4 MG SL tablet Place 0.4 mg under the tongue upper central chest   Neurological:      Mental Status: He is alert and oriented to person, place, and time. No results found for: LABA1C  Lab Results   Component Value Date    CREATININE 1.28 (H) 01/21/2020     Lab Results   Component Value Date    ALT 52 (H) 01/21/2020    AST 39 01/21/2020     Lab Results   Component Value Date    CHOL 125 06/25/2019    TRIG 127 06/25/2019    HDL 52 12/09/2019    LDLCALC 48 12/09/2019        Assessment & Plan   Visit Diagnoses and Associated Orders     Stable angina ([facility] [address]    -  Primary    Stable and well-controlled with long-acting nitrates. Essential hypertension        Borderline control. Add lisinopril. Consider modifying use of atenolol. lisinopril (PRINIVIL;ZESTRIL) 10 MG tablet [[id number]]           Coronary artery disease involving native coronary artery of native heart without angina pectoris        Stable, fairly well controlled with recent chest pain episode and catheterization during which no intervention was made         Shaw's esophagus with dysplasia        Stable and well-controlled with PPI. Change omeprazole to pantoprazole. EGD 1 year    pantoprazole (PROTONIX) 40 MG tablet [66869]           Alcohol use disorder, mild, abuse        Decreasing consumption of alcohol. Urged further reduction due to persistent elevation in glucose. Abnormal glucose        Check A1c. Reviewed low-carb diet and alcohol consumption. Hemoglobin A1C [48593 Custom]   - Future Order         Nocturnal leg cramps        Significantly improved on magnesium         Constipation, unspecified constipation type        Start powdered psyllium. If no significant change then consider anal stricture related to hemorrhoid surgery. Consider surgical reevaluation.     psyllium (METAMUCIL SMOOTH TEXTURE) 28 % packet [[id number]]                   Reviewed with the patient: all disease processes, current clinical status, medications, activities and diet.      Side effects, and Chronic Pain Monitoring:   No flowsheet data found.         Arlene Freedman MD

## 2020-02-14 ENCOUNTER — HOSPITAL ENCOUNTER (OUTPATIENT)
Dept: CARDIAC REHAB | Age: 75
Setting detail: THERAPIES SERIES
Discharge: HOME | End: 2020-02-14
Payer: MEDICARE

## 2020-02-14 PROCEDURE — 93798 PHYS/QHP OP CAR RHAB W/ECG: CPT

## 2020-02-17 ENCOUNTER — HOSPITAL ENCOUNTER (OUTPATIENT)
Dept: CARDIAC REHAB | Age: 75
Setting detail: THERAPIES SERIES
Discharge: HOME | End: 2020-02-17
Payer: MEDICARE

## 2020-02-17 PROCEDURE — 93798 PHYS/QHP OP CAR RHAB W/ECG: CPT

## 2020-02-19 ENCOUNTER — HOSPITAL ENCOUNTER (OUTPATIENT)
Dept: CARDIAC REHAB | Age: 75
Setting detail: THERAPIES SERIES
Discharge: HOME | End: 2020-02-19
Payer: MEDICARE

## 2020-02-19 PROCEDURE — 93798 PHYS/QHP OP CAR RHAB W/ECG: CPT

## 2020-02-21 ENCOUNTER — HOSPITAL ENCOUNTER (OUTPATIENT)
Dept: CARDIAC REHAB | Age: 75
Setting detail: THERAPIES SERIES
Discharge: HOME | End: 2020-02-21
Payer: MEDICARE

## 2020-02-21 PROCEDURE — 93798 PHYS/QHP OP CAR RHAB W/ECG: CPT

## 2020-02-24 ENCOUNTER — HOSPITAL ENCOUNTER (OUTPATIENT)
Dept: CARDIAC REHAB | Age: 75
Setting detail: THERAPIES SERIES
Discharge: HOME | End: 2020-02-24
Payer: MEDICARE

## 2020-02-24 PROCEDURE — 93798 PHYS/QHP OP CAR RHAB W/ECG: CPT

## 2020-02-26 ENCOUNTER — HOSPITAL ENCOUNTER (OUTPATIENT)
Dept: CARDIAC REHAB | Age: 75
Setting detail: THERAPIES SERIES
Discharge: HOME | End: 2020-02-26
Payer: MEDICARE

## 2020-02-26 PROCEDURE — 93798 PHYS/QHP OP CAR RHAB W/ECG: CPT

## 2020-02-28 ENCOUNTER — HOSPITAL ENCOUNTER (OUTPATIENT)
Dept: CARDIAC REHAB | Age: 75
Setting detail: THERAPIES SERIES
Discharge: HOME | End: 2020-02-28
Payer: MEDICARE

## 2020-02-28 PROCEDURE — 93798 PHYS/QHP OP CAR RHAB W/ECG: CPT

## 2020-03-02 ENCOUNTER — HOSPITAL ENCOUNTER (OUTPATIENT)
Dept: CARDIAC REHAB | Age: 75
Setting detail: THERAPIES SERIES
Discharge: HOME | End: 2020-03-02
Payer: MEDICARE

## 2020-03-02 PROCEDURE — 93798 PHYS/QHP OP CAR RHAB W/ECG: CPT

## 2020-03-04 ENCOUNTER — HOSPITAL ENCOUNTER (OUTPATIENT)
Dept: CARDIAC REHAB | Age: 75
Setting detail: THERAPIES SERIES
Discharge: HOME | End: 2020-03-04
Payer: MEDICARE

## 2020-03-04 PROCEDURE — 93798 PHYS/QHP OP CAR RHAB W/ECG: CPT

## 2020-03-06 ENCOUNTER — HOSPITAL ENCOUNTER (OUTPATIENT)
Dept: CARDIAC REHAB | Age: 75
Setting detail: THERAPIES SERIES
Discharge: HOME | End: 2020-03-06
Payer: MEDICARE

## 2020-03-06 PROCEDURE — 93798 PHYS/QHP OP CAR RHAB W/ECG: CPT

## 2020-05-04 ENCOUNTER — OFFICE VISIT (OUTPATIENT)
Dept: UROLOGY | Age: 75
End: 2020-05-04
Payer: MEDICARE

## 2020-05-04 VITALS
HEART RATE: 55 BPM | WEIGHT: 197 LBS | BODY MASS INDEX: 33.63 KG/M2 | DIASTOLIC BLOOD PRESSURE: 80 MMHG | SYSTOLIC BLOOD PRESSURE: 120 MMHG | HEIGHT: 64 IN

## 2020-05-04 DIAGNOSIS — C61 PROSTATE CANCER (HCC): ICD-10-CM

## 2020-05-04 LAB
BILIRUBIN, POC: ABNORMAL
BLOOD URINE, POC: ABNORMAL
CLARITY, POC: CLEAR
COLOR, POC: YELLOW
GLUCOSE URINE, POC: ABNORMAL
KETONES, POC: ABNORMAL
LEUKOCYTE EST, POC: ABNORMAL
NITRITE, POC: ABNORMAL
PH, POC: 6.5
PROSTATE SPECIFIC ANTIGEN: 0.3 NG/ML (ref 0–6.22)
PROTEIN, POC: ABNORMAL
SPECIFIC GRAVITY, POC: 1.01
UROBILINOGEN, POC: 0.2

## 2020-05-04 PROCEDURE — 1036F TOBACCO NON-USER: CPT | Performed by: UROLOGY

## 2020-05-04 PROCEDURE — G8417 CALC BMI ABV UP PARAM F/U: HCPCS | Performed by: UROLOGY

## 2020-05-04 PROCEDURE — 81003 URINALYSIS AUTO W/O SCOPE: CPT | Performed by: UROLOGY

## 2020-05-04 PROCEDURE — G8427 DOCREV CUR MEDS BY ELIG CLIN: HCPCS | Performed by: UROLOGY

## 2020-05-04 PROCEDURE — 3017F COLORECTAL CA SCREEN DOC REV: CPT | Performed by: UROLOGY

## 2020-05-04 PROCEDURE — 1123F ACP DISCUSS/DSCN MKR DOCD: CPT | Performed by: UROLOGY

## 2020-05-04 PROCEDURE — 99203 OFFICE O/P NEW LOW 30 MIN: CPT | Performed by: UROLOGY

## 2020-05-04 PROCEDURE — 4040F PNEUMOC VAC/ADMIN/RCVD: CPT | Performed by: UROLOGY

## 2020-05-04 NOTE — PROGRESS NOTES
SURGERY  2013    INGUINAL HERNIA REPAIR  1990    PROSTATE SURGERY  2003    removed    PROSTATE SURGERY  2013    biopsy    UPPER GASTROINTESTINAL ENDOSCOPY  2018    VASECTOMY       Social History     Socioeconomic History    Marital status:      Spouse name: None    Number of children: None    Years of education: None    Highest education level: None   Occupational History    None   Social Needs    Financial resource strain: None    Food insecurity     Worry: None     Inability: None    Transportation needs     Medical: None     Non-medical: None   Tobacco Use    Smoking status: Former Smoker     Packs/day: 1.00     Years: 51.00     Pack years: 51.00     Types: Cigarettes     Start date:      Last attempt to quit: 3/9/2011     Years since quittin.1    Smokeless tobacco: Never Used    Tobacco comment: started age 17    Substance and Sexual Activity    Alcohol use:  Yes    Drug use: None    Sexual activity: Never   Lifestyle    Physical activity     Days per week: None     Minutes per session: None    Stress: None   Relationships    Social connections     Talks on phone: None     Gets together: None     Attends Jehovah's witness service: None     Active member of club or organization: None     Attends meetings of clubs or organizations: None     Relationship status: None    Intimate partner violence     Fear of current or ex partner: None     Emotionally abused: None     Physically abused: None     Forced sexual activity: None   Other Topics Concern    None   Social History Narrative    None     Family History   Problem Relation Age of Onset    Cancer Father         prostate    Heart Disease Father     Cancer Brother         prostate    Heart Disease Brother     Heart Disease Paternal Uncle      Current Outpatient Medications   Medication Sig Dispense Refill    pantoprazole (PROTONIX) 40 MG tablet Take 1 tablet by mouth daily 90 tablet 4    lisinopril (PRINIVIL;ZESTRIL) 10 MG tablet Take 1 tablet by mouth daily 90 tablet 4    isosorbide mononitrate (IMDUR) 60 MG extended release tablet Take 2 tablets by mouth daily 30 tablet 3    Magnesium 400 MG CAPS Take 1 capsule by mouth daily (Patient taking differently: Take 1 capsule by mouth nightly ) 90 capsule 4    atenolol (TENORMIN) 50 MG tablet Take 50 mg by mouth nightly       clopidogrel (PLAVIX) 75 MG tablet Take 75 mg by mouth daily       rosuvastatin (CRESTOR) 20 MG tablet Take 20 mg by mouth nightly       nitroGLYCERIN (NITROSTAT) 0.4 MG SL tablet Place 0.4 mg under the tongue every 5 minutes as needed for Chest pain up to max of 3 total doses. If no relief after 1 dose, call 911.  aspirin 81 MG tablet Take 81 mg by mouth daily      lidocaine (LIDODERM) 5 % Place 1 patch onto the skin daily 12 hours on, 12 hours off. (Patient taking differently: Place 1 patch onto the skin daily as needed 12 hours on, 12 hours off. ) 90 patch 1    Cholecalciferol (VITAMIN D3) 1000 units TABS Take by mouth daily       FIBER COMPLETE PO Take 1 capsule by mouth nightly       Cyanocobalamin (VITAMIN B-12) 3000 MCG SUBL Take by mouth daily        No current facility-administered medications for this visit. Iodides and Oxycodone-acetaminophen  All reviewed and verified by Dr Sandy Ordaz on today's visit    No results found for: PSA, PSADIA  Results for POC orders placed in visit on 05/04/20   POCT Urinalysis No Micro (Auto)   Result Value Ref Range    Color, UA yellow     Clarity, UA clear     Glucose, UA POC neg     Bilirubin, UA neg     Ketones, UA neg     Spec Grav, UA 1.010     Blood, UA POC trace     pH, UA 6.5     Protein, UA POC neg     Urobilinogen, UA 0.2     Leukocytes, UA neg     Nitrite, UA neg        Physical Exam  Vitals:    05/04/20 0834   BP: 120/80   Pulse: 55   Weight: 197 lb (89.4 kg)   Height: 5' 4\" (1.626 m)     Constitutional: patient is oriented to person, place, and time.  patient appears

## 2020-06-08 ENCOUNTER — OFFICE VISIT (OUTPATIENT)
Dept: UROLOGY | Age: 75
End: 2020-06-08
Payer: MEDICARE

## 2020-06-08 VITALS
BODY MASS INDEX: 33.29 KG/M2 | HEART RATE: 54 BPM | SYSTOLIC BLOOD PRESSURE: 124 MMHG | DIASTOLIC BLOOD PRESSURE: 72 MMHG | WEIGHT: 195 LBS | HEIGHT: 64 IN

## 2020-06-08 PROCEDURE — 4040F PNEUMOC VAC/ADMIN/RCVD: CPT | Performed by: UROLOGY

## 2020-06-08 PROCEDURE — 1123F ACP DISCUSS/DSCN MKR DOCD: CPT | Performed by: UROLOGY

## 2020-06-08 PROCEDURE — 99214 OFFICE O/P EST MOD 30 MIN: CPT | Performed by: UROLOGY

## 2020-06-08 PROCEDURE — G8417 CALC BMI ABV UP PARAM F/U: HCPCS | Performed by: UROLOGY

## 2020-06-08 PROCEDURE — 1036F TOBACCO NON-USER: CPT | Performed by: UROLOGY

## 2020-06-08 PROCEDURE — G8427 DOCREV CUR MEDS BY ELIG CLIN: HCPCS | Performed by: UROLOGY

## 2020-06-08 PROCEDURE — 3017F COLORECTAL CA SCREEN DOC REV: CPT | Performed by: UROLOGY

## 2020-06-08 PROCEDURE — 96402 CHEMO HORMON ANTINEOPL SQ/IM: CPT | Performed by: UROLOGY

## 2020-06-08 NOTE — PROGRESS NOTES
[facility] EVALUATION NOTE                                                 H&P                                                                                                                                                 Reason for Visit  Prostate cancer with biochemical failure following radical prostatectomy    History of Present Illness  Patient currently getting intermittent Lupron injections  Here for Lupron injection  Most recent PSA is 0      Urologic Review of Systems/Symptoms  Denies hematuria  Denies dysuria  Denies incontinence  Denies flank pain  Other Urologic: No issues with urination    Review of Systems  Head and neck: No issues/reviewed  Cardiac: No recent issues/reviewed  Pulmonary: No issues/reviewed  Gastrointestinal: No issues/reviewed  Neurologic: No recent issues/reviewed  Extremities: No issues/reviewed  Lymphatics: No lymphadenopathy no change  Genitourinary: See above  Skin: No issues/reviewed  Hospitalization: None  No change in medications  All 14 categories of Review of Systems otherwise reviewed no other findings reported.     Past Medical History:   Diagnosis Date    Cancer [facility])     prostate    Hyperlipidemia     Hypertension      Past Surgical History:   Procedure Laterality Date    CARDIAC CATHETERIZATION  12/2019    CATARACT REMOVAL WITH IMPLANT Bilateral 06/2013    COLONOSCOPY  04/2016    CORONARY ANGIOPLASTY WITH STENT PLACEMENT  06/1998    CORONARY ANGIOPLASTY WITH STENT PLACEMENT  02/2007    CORONARY ANGIOPLASTY WITH STENT PLACEMENT  01/2009    CORONARY ANGIOPLASTY WITH STENT PLACEMENT  07/2012    HEMORRHOID SURGERY  11/2013    INGUINAL HERNIA REPAIR  05/1990    PROSTATE SURGERY  06/2003    removed    PROSTATE SURGERY  09/2013    biopsy    UPPER GASTROINTESTINAL ENDOSCOPY  09/2018    VASECTOMY       Social History     Socioeconomic History    Marital status:      Spouse name: None    Number of children: None    Years of education: None   

## 2020-08-07 RX ORDER — LIDOCAINE 50 MG/G
PATCH TOPICAL
Qty: 30 PATCH | Refills: 3 | Status: SHIPPED | OUTPATIENT
Start: 2020-08-07 | End: 2022-01-13

## 2020-08-07 NOTE — TELEPHONE ENCOUNTER
Rx request   Requested Prescriptions     Pending Prescriptions Disp Refills    lidocaine (LIDODERM) 5 % [Pharmacy Med Name: [facility]'S 5%] 30 patch 3     Sig: PLACE 1 PATCH ON THE SKIN DAILY, 12 HOURS ON AND 12 HOURS OFF     LOV 2/13/2020  Next Visit Date:  Future Appointments   Date Time Provider Thiago Jeter   9/17/2020  9:30 AM Cecilia Gross MD [facility]   6/14/2021  8:00 AM Neil Catalan MD [facility]

## 2020-09-03 ENCOUNTER — TELEPHONE (OUTPATIENT)
Dept: FAMILY MEDICINE CLINIC | Age: 75
End: 2020-09-03

## 2020-09-17 ENCOUNTER — OFFICE VISIT (OUTPATIENT)
Dept: FAMILY MEDICINE CLINIC | Age: 75
End: 2020-09-17
Payer: MEDICARE

## 2020-09-17 ENCOUNTER — TELEPHONE (OUTPATIENT)
Dept: CASE MANAGEMENT | Age: 75
End: 2020-09-17

## 2020-09-17 VITALS
HEIGHT: 64 IN | WEIGHT: 199 LBS | RESPIRATION RATE: 20 BRPM | OXYGEN SATURATION: 99 % | BODY MASS INDEX: 33.97 KG/M2 | TEMPERATURE: 96.7 F | HEART RATE: 55 BPM | SYSTOLIC BLOOD PRESSURE: 136 MMHG | DIASTOLIC BLOOD PRESSURE: 86 MMHG

## 2020-09-17 VITALS
SYSTOLIC BLOOD PRESSURE: 136 MMHG | RESPIRATION RATE: 20 BRPM | HEIGHT: 64 IN | DIASTOLIC BLOOD PRESSURE: 86 MMHG | HEART RATE: 55 BPM | TEMPERATURE: 96.7 F | WEIGHT: 199 LBS | OXYGEN SATURATION: 99 % | BODY MASS INDEX: 33.97 KG/M2

## 2020-09-17 PROBLEM — R19.5 CHANGE IN STOOL: Chronic | Status: ACTIVE | Noted: 2020-09-17

## 2020-09-17 PROBLEM — R19.8 TENESMUS: Chronic | Status: ACTIVE | Noted: 2020-09-17

## 2020-09-17 PROBLEM — M79.671 PAIN OF RIGHT HEEL: Chronic | Status: ACTIVE | Noted: 2020-09-17

## 2020-09-17 PROBLEM — C61 MALIGNANT TUMOR OF PROSTATE (HCC): Status: ACTIVE | Noted: 2020-09-17

## 2020-09-17 PROBLEM — R41.3 MEMORY CHANGE: Chronic | Status: ACTIVE | Noted: 2020-09-17

## 2020-09-17 PROBLEM — R10.84 GENERALIZED ABDOMINAL PAIN: Chronic | Status: ACTIVE | Noted: 2020-09-17

## 2020-09-17 PROBLEM — E78.5 HYPERLIPIDEMIA: Status: ACTIVE | Noted: 2020-09-17

## 2020-09-17 PROBLEM — H90.3 SENSORINEURAL HEARING LOSS (SNHL) OF BOTH EARS: Status: ACTIVE | Noted: 2020-09-17

## 2020-09-17 PROBLEM — G47.09 OTHER INSOMNIA: Chronic | Status: ACTIVE | Noted: 2020-09-17

## 2020-09-17 PROCEDURE — 3017F COLORECTAL CA SCREEN DOC REV: CPT | Performed by: FAMILY MEDICINE

## 2020-09-17 PROCEDURE — 1123F ACP DISCUSS/DSCN MKR DOCD: CPT | Performed by: FAMILY MEDICINE

## 2020-09-17 PROCEDURE — 90694 VACC AIIV4 NO PRSRV 0.5ML IM: CPT | Performed by: FAMILY MEDICINE

## 2020-09-17 PROCEDURE — G0438 PPPS, INITIAL VISIT: HCPCS | Performed by: FAMILY MEDICINE

## 2020-09-17 PROCEDURE — 4040F PNEUMOC VAC/ADMIN/RCVD: CPT | Performed by: FAMILY MEDICINE

## 2020-09-17 PROCEDURE — G8427 DOCREV CUR MEDS BY ELIG CLIN: HCPCS | Performed by: FAMILY MEDICINE

## 2020-09-17 PROCEDURE — G8417 CALC BMI ABV UP PARAM F/U: HCPCS | Performed by: FAMILY MEDICINE

## 2020-09-17 PROCEDURE — G0008 ADMIN INFLUENZA VIRUS VAC: HCPCS | Performed by: FAMILY MEDICINE

## 2020-09-17 PROCEDURE — 99215 OFFICE O/P EST HI 40 MIN: CPT | Performed by: FAMILY MEDICINE

## 2020-09-17 PROCEDURE — 1036F TOBACCO NON-USER: CPT | Performed by: FAMILY MEDICINE

## 2020-09-17 ASSESSMENT — PATIENT HEALTH QUESTIONNAIRE - PHQ9
1. LITTLE INTEREST OR PLEASURE IN DOING THINGS: 0
SUM OF ALL RESPONSES TO PHQ QUESTIONS 1-9: 0
SUM OF ALL RESPONSES TO PHQ QUESTIONS 1-9: 0
2. FEELING DOWN, DEPRESSED OR HOPELESS: 0
SUM OF ALL RESPONSES TO PHQ9 QUESTIONS 1 & 2: 0

## 2020-09-17 ASSESSMENT — LIFESTYLE VARIABLES
HOW OFTEN DO YOU HAVE SIX OR MORE DRINKS ON ONE OCCASION: 1
AUDIT-C TOTAL SCORE: 5
HOW MANY STANDARD DRINKS CONTAINING ALCOHOL DO YOU HAVE ON A TYPICAL DAY: 2
HOW OFTEN DO YOU HAVE A DRINK CONTAINING ALCOHOL: 2

## 2020-09-17 NOTE — PROGRESS NOTES
Subjective  Melany Deluca, 70 y.o. male presents today with:  Chief Complaint   Patient presents with    Hypertension     6 month follow up for HTN, CAD and obesity           [facility]    08/14/2019: Gets his care one a year at the [place] and sees PCP once a year. Also has cardiologist.     Patient is here for f/u HTN and CAD Has 8 stents, 7 of which are in the RCA. Is compliant with meds and has no side effects from them. Avoids added salt. Tries to eat healthy. Exercises occasionally. Has no chest pain, shortness of breath, palpitations or edema. Is on atenolol. Had been on Lisinopril which he stopped because he believed he was on too many medications. Did not have SEs from it. WIfe notes that his balance is off when he first gets up from a chair or the bed. No dizziness.      Prostate CA s/p prostatectomy 2003. PSA is increasing and he is back on hormone therapy. Currently asx.     Shaw's esophagus. On daily PPI. Due for EGD and colonoscopy in 4 years. Last EGD was within the last year. Sees Dr. Darryl Pradhan regularly.     Chronic right hip pain related to possible OA. Uses lidocaine patch.     Drinks 12 beers and shots of liquor on a weekend day frequently.  Also drinks but less during the week.  Transaminases mildly elevated in June 02/13/2020: Patient is here for follow-up of CAD, angina, HTN. On December 1, 2019, RCA stent was 60% blocked another stent was 100% blocked. No PTCA was done. No chest pain as long as he takes Imdur. Mild shortness of breath with exertion at baseline. No palpitations, edema, paroxysmal nocturnal dyspnea, orthopnea. Is compliant with meds which do not cause significant side effects. Avoids added salt; exercises occasionally and tries to eat a healthy diet.     GERD and Shaw's Esophagus:. Well-controlled with PPI, caffeine restriction, diet restriction. No weight loss. No abdominal pain.  No bloody or black tarry stools.     ETOH - 2-4 servings of ETOH 4-5 times a week.     Tobacco - QUIT 2011.     Review of labs in EHR reveals persistently and mildly elevated glucose levels.     Leg cramps improved on magnesium.     Every morning has a few bowel movements and feels as though he has not completely evacuated. Has sensation of needing to have a bowel movement throughout the day. Started after he had hemorrhoid surgery. Sometimes stools are extremely dark. Rare use of stool softeners. Takes fiber capsule. Diet is low in fiber. No steatorrhea. Drinks 40-60 ounces of water daily. Drinks about one pot of coffee daily. Last colonoscopy was 2016. Due for EGD and colonoscopy in 2021. Has not started recommended Metamucil powder.     Lesion on center of upper chest since summer. Is irritating and is growing. Not changing color. 09/17/2020: Right posterior heel pain, sharp, occasional. Started with increased walking on treadmill. Intermittent severe central abdominal pain relieved by vomiting (by intentionally gagging). Emesis is largely liquid with small amount of undigested food. Occurs every 2-4 weeks. When it happens feels constipated. Typically has 3-4 bowel movements during first 3 hours he is awake and never feels completely evacuated. Has been an issue for over a year. Has colonoscopy and endoscopy in April 2021. Sometimes stools are very dark other times light colored. He has not had bloody or black tarry stool. No unintentional weight loss. Stool is always well-formed but very small in quantity. Marcelo Hunter to Dr. Martha Joy but wants to move to [facility] for care. No chronic back issues. Patient is here for follow-up of HTN, CAD. No chest pain,  no new shortness of breath but has baseline PADILLA, no palpitations, no edema, no paroxysmal nocturnal dyspnea, no orthopnea. Is compliant with meds which do not cause significant side effects. Avoids added salt; exercises regularly and tries to eat a healthy diet. CKD - GFR ranging b/w 54 and 55 x 6 months.  Has repeat labs ordered by [facility] for December. ETOH -alcohol use-increased since began. He consumes between 3 and 6 servings of alcohol per day. He notes that he has difficulty falling asleep and staying he also feels like his memory is impaired. He has difficulty remembering names and can be forgetful at times. Has increased reliance on list and visual cues. He has not become lost in familiar surroundings. 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     No other questions and or concerns for today's visit          Past Medical History:   Diagnosis Date    Cancer [facility])     prostate    Hyperlipidemia     Hypertension      Past Surgical History:   Procedure Laterality Date    CARDIAC CATHETERIZATION  2019    CATARACT REMOVAL WITH IMPLANT Bilateral 2013    COLONOSCOPY  2016    CORONARY ANGIOPLASTY WITH STENT PLACEMENT  1998    CORONARY ANGIOPLASTY WITH STENT PLACEMENT  2007    CORONARY ANGIOPLASTY WITH STENT PLACEMENT  2009    CORONARY ANGIOPLASTY WITH STENT PLACEMENT  2012    HEMORRHOID SURGERY  2013    INGUINAL HERNIA REPAIR  1990    PROSTATE SURGERY  2003    removed    PROSTATE SURGERY  2013    biopsy    UPPER GASTROINTESTINAL ENDOSCOPY  2018    VASECTOMY       Social History     Socioeconomic History    Marital status:      Spouse name: Not on file    Number of children: Not on file    Years of education: Not on file    Highest education level: Not on file   Occupational History    Not on file   Social Needs    Financial resource strain: Not on file    Food insecurity     Worry: Not on file     Inability: Not on file   [employer] needs     Medical: Not on file     Non-medical: Not on file   Tobacco Use    Smoking status: Former Smoker     Packs/day: 1.00     Years: 51.00     Pack years: 51.00     Types: Cigarettes     Start date:      Last attempt to quit: 3/9/2011     Years since quittin.5    Smokeless tobacco: Never Used    Tobacco comment: started age 17    Substance and Sexual Activity    Alcohol use:  Yes    Drug use: Not on file    Sexual activity: Never   Lifestyle    Physical activity     Days per week: Not on file     Minutes per session: Not on file    Stress: Not on file   Relationships    Social connections     Talks on phone: Not on file     Gets together: Not on file     Attends Congregation service: Not on file     Active member of club or organization: Not on file     Attends meetings of clubs or organizations: Not on file     Relationship status: Not on file    Intimate partner violence     Fear of current or ex partner: Not on file     Emotionally abused: Not on file     Physically abused: Not on file     Forced sexual activity: Not on file   Other Topics Concern    Not on file   Social History Narrative    Not on file     Family History   Problem Relation Age of Onset    Cancer Father         prostate    Heart Disease Father     Cancer Brother         prostate    Heart Disease Brother     Heart Disease Paternal Uncle      Allergies   Allergen Reactions    Iodides Nausea And Vomiting     Contrast Dye    Oxycodone-Acetaminophen Nausea And Vomiting     Current Outpatient Medications   Medication Sig Dispense Refill    lidocaine (LIDODERM) 5 % PLACE 1 PATCH ON THE SKIN DAILY, 12 HOURS ON AND 12 HOURS OFF 30 patch 3    pantoprazole (PROTONIX) 40 MG tablet Take 1 tablet by mouth daily 90 tablet 4    lisinopril (PRINIVIL;ZESTRIL) 10 MG tablet Take 1 tablet by mouth daily 90 tablet 4    isosorbide mononitrate (IMDUR) 60 MG extended release tablet Take 2 tablets by mouth daily 30 tablet 3    Magnesium 400 MG CAPS Take 1 capsule by mouth daily (Patient taking differently: Take 1 capsule by mouth nightly ) 90 capsule 4    atenolol (TENORMIN) 50 MG tablet Take 50 mg by mouth nightly       clopidogrel (PLAVIX) 75 MG tablet Take 75 mg by mouth daily       rosuvastatin (CRESTOR) 20 MG tablet Take 20 mg by mouth nightly  nitroGLYCERIN (NITROSTAT) 0.4 MG SL tablet Place 0.4 mg under the tongue every 5 minutes as needed for Chest pain up to max of 3 total doses. If no relief after 1 dose, call 911.  aspirin 81 MG tablet Take 81 mg by mouth daily      Cholecalciferol (VITAMIN D3) 1000 units TABS Take by mouth daily       FIBER COMPLETE PO Take 1 capsule by mouth nightly       Cyanocobalamin (VITAMIN B-12) 3000 MCG SUBL Take by mouth daily        No current facility-administered medications for this visit. PMH, Surgical Hx, Family Hx, and Social Hxreviewed and updated. Health Maintenance reviewed. Objective    Vitals:    09/17/20 0916   BP: 136/86   Site: Left Upper Arm   Position: Sitting   Cuff Size: Medium Adult   Pulse: 55   Resp: 20   Temp: 96.7 °F (35.9 °C)   TempSrc: Temporal   SpO2: 99%   Weight: 199 lb (90.3 kg)   Height: 5' 4\" (1.626 m)        Physical Exam  Constitutional:       Appearance: He is obese. HENT:      Head: Normocephalic and atraumatic. Ears:      Comments: Bilateral hearing loss, wears hearing aids  Eyes:      General: No scleral icterus. Conjunctiva/sclera: Conjunctivae normal.   Neck:      Musculoskeletal: Normal range of motion and neck supple. Thyroid: No thyromegaly. Vascular: No carotid bruit. Cardiovascular:      Rate and Rhythm: Normal rate and regular rhythm. Heart sounds: Normal heart sounds, S1 normal and S2 normal.   Pulmonary:      Effort: Pulmonary effort is normal.      Breath sounds: Normal breath sounds. No wheezing or rales. Abdominal:      General: Bowel sounds are normal. There is no distension. Palpations: Abdomen is soft. There is no mass. Tenderness: There is no abdominal tenderness. There is no guarding or rebound. Musculoskeletal:      Right lower leg: No edema. Left lower leg: No edema. Skin:     General: Skin is warm and dry. Neurological:      Mental Status: He is alert and oriented to person, place, and time. Psychiatric:         Mood and Affect: Mood normal.         Behavior: Behavior normal.         Thought Content: Thought content normal.         Judgment: Judgment normal.           Lab Results   Component Value Date    LABA1C 5.9 02/13/2020     Lab Results   Component Value Date    CREATININE 1.28 (H) 01/21/2020     Lab Results   Component Value Date    ALT 52 (H) 01/21/2020    AST 39 01/21/2020     Lab Results   Component Value Date    CHOL 125 06/25/2019    TRIG 127 06/25/2019    HDL 52 12/09/2019    LDLCALC 48 12/09/2019        Assessment & Plan   Visit Diagnoses and Associated Orders     Coronary artery disease involving native coronary artery of native heart without angina pectoris    -  Primary    Stable and well-controlled on lisinopril, rosuvastatin, aspirin, isosorbide mononitrate, and clopidogrel             Essential hypertension        Stable and well-controlled on lisinopril and atenolol. Unstable angina (HCC)        Resolved. Shaw's esophagus with dysplasia        Due for endoscopy next year. Katheryn Walker MD, Gastroenterology, [facility] [[id number] Custom]           Screening for AAA (abdominal aortic aneurysm)        Had screening for AAA 2018. Pain of right heel        . Ice after exercise. If persists then x-ray. Wear well cushioned shoes         Tenesmus        Referred to GI    Nevaeh Brito MD, Gastroenterology, [facility] Custom]           Alcohol use disorder, mild, abuse        Urged cessation. Reviewed health complications related to chronic alcohol use         Change in stool        Urged titration of Metamucil to twice daily. Referred to GI. Katheryn Walker MD, Gastroenterology, [facility] Custom]           Generalized abdominal pain        Unclear etiology. Referred to GI.     Katheryn Walker MD, Gastroenterology, [facility] [[id number] Custom]           Other insomnia        Likely related to ETOH or multifactorial.  Reviewed importance good sleep hygiene and alcohol cessation. Memory change        Alcohol likely contributing. Urged cessation prior to further evaluation. Flu vaccine need        INFLUENZA, QUADV, ADJUVANTED, 65 YRS =, IM, PF, PREFILL SYR, 0.5ML (FLUAD) [[id number] Custom]                   Reviewed with the patient: all disease processes, current clinical status, medications, activities and diet.      Side effects, adverse effects of the medication prescribed today, as well as treatment plan/ rationale and result expectations have been discussed with the patient who expresses understanding and desires to proceed.     Close follow up to evaluate treatment results and for coordination of care. I have reviewed the patient's medical history in detail and updated the computerized patient record. 45 minutes    Please note this report has been partially produced using speech recognition software and may contain mistakes related to that system including errors in grammar, punctuation and spelling as well as words and phrases that may seem inappropriate. If there are questions or concerns, please feel free to contact me to clarify. Orders Placed This Encounter   Procedures    INFLUENZA, QUADV, ADJUVANTED, 72 YRS =, IM, PF, PREFILL SYR, 0.5ML (FLUAD)   Rosaura Varghese MD, Gastroenterology, [facility]     Referral Priority:   Routine     Referral Type:   Eval and Treat     Referral Reason:   Specialty Services Required     Referred to Provider:   Marco Garcia MD     Requested Specialty:   Gastroenterology     Number of Visits Requested:   1     No orders of the defined types were placed in this encounter. There are no discontinued medications. Return in about 6 months (around 3/17/2021) for HTN, HLD, CAD - VV . Controlled Substance Monitoring:    Acute and Chronic Pain Monitoring:   No flowsheet data found.         Meera Browning MD

## 2020-09-17 NOTE — PATIENT INSTRUCTIONS
Patient Education        Learning About Alcohol Use Disorder  What is alcohol use disorder? Alcohol use disorder means that a person drinks alcohol even though it causes harm to themselves or others. It can range from mild to severe. The more signs of this disorder you have, the more severe it may be. Moderate to severe alcohol use disorder is sometimes called addiction. People who have it may find it hard to control their use of alcohol. People who have this disorder may argue with others about how much they're drinking. Their job may be affected because of drinking. They may drink when it's dangerous or illegal, such as when they drive. They also may have a strong need, or craving, to drink. They may feel like they must drink just to get by. Their drinking may increase their risk of getting hurt or being in a car crash. Over time, drinking too much alcohol may cause health problems. These may include high blood pressure, liver problems, or problems with digestion. What are the signs? Maybe you've wondered about your alcohol habits, or how to tell if your drinking is becoming a problem. Here are some of the signs of alcohol use disorder. You may have it if you have two or more of the following signs:  · You drink larger amounts of alcohol than you ever meant to. Or you've been drinking for a longer time than you ever meant to. · You can't cut down or control your use. Or you constantly wish you could cut down. · You spend a lot of time getting or drinking alcohol or recovering from its effects. · You have strong cravings for alcohol. · You can no longer do your main jobs at work, at school, or at home. · You keep drinking alcohol, even though your use hurts your relationships. · You have stopped doing important activities because of your alcohol use. · You drink alcohol in situations where doing so is dangerous. · You keep drinking alcohol even though you know it's causing health problems.   · You need more and more alcohol to get the same effect, or you get less effect from the same amount over time. This is called tolerance. · You have uncomfortable symptoms when you stop drinking alcohol or use less. This is called withdrawal.  Alcohol use disorder can range from mild to severe. The more signs you have, the more severe the disorder may be. Moderate to severe alcohol use disorder is sometimes called addiction. You might not realize that your drinking is a problem. You might not drink large amounts when you drink. Or you might go for days or weeks between drinking episodes. But even if you don't drink very often, your drinking could still be harmful and put you at risk. How is alcohol use disorder treated? Getting help is up to you. But you don't have to do it alone. There are many people and kinds of treatments that can help. Treatment for alcohol use disorder can include:  · Group therapy, one or more types of counseling, and alcohol education. · Medicines that help to:  ? Reduce withdrawal symptoms and help you safely stop drinking. ? Reduce cravings for alcohol. · Support groups. These groups include Alcoholics Anonymous and [employer] (Self-Management and Recovery Training). Some people are able to stop or cut back on drinking with help from a counselor. People who have moderate to severe alcohol use disorder may need medical treatment. They may need to stay in a hospital or treatment center. You may have a treatment team to help you. This team may include a psychologist or psychiatrist, counselors, doctors, social workers, nurses, and a . A  helps plan and manage your treatment. Follow-up care is a key part of your treatment and safety. Be sure to make and go to all appointments, and call your doctor if you are having problems. It's also a good idea to know your test results and keep a list of the medicines you take. Where can you learn more?   Go to https://chpepiceweb.[email or web address] org and sign in to your [employer] Master account. Enter [id number] in the [facility] box to learn more about \"Learning About Alcohol Use Disorder. \"     If you do not have an account, please click on the \"Sign Up Now\" link. Current as of: August 22, 2019               Content Version: 12.5  © 2006-2020 [employer]. Care instructions adapted under license by [facility] ([facility]). If you have questions about a medical condition or this instruction, always ask your healthcare professional. [place] [address] any warranty or liability for your use of this information. Patient Education        Insomnia: Care Instructions  Your Care Instructions     Insomnia is the inability to sleep well. It is a common problem for most people at some time. Insomnia may make it hard for you to get to sleep, stay asleep, or sleep as long as you need to. This can make you tired and grouchy during the day. It can also make you forgetful, less effective at work, and unhappy. Insomnia can be caused by conditions such as depression or anxiety. Pain can also affect your ability to sleep. When these problems are solved, the insomnia usually clears up. But sometimes bad sleep habits can cause insomnia. If insomnia is affecting your work or your enjoyment of life, you can take steps to improve your sleep. Follow-up care is a key part of your treatment and safety. Be sure to make and go to all appointments, and call your doctor if you are having problems. It's also a good idea to know your test results and keep a list of the medicines you take. How can you care for yourself at home? What to avoid   · Do not have drinks with caffeine, such as coffee or black tea, for 8 hours before bed. · Do not smoke or use other types of tobacco near bedtime. Nicotine is a stimulant and can keep you awake.   · Avoid drinking alcohol late in the evening, because it can cause you to wake in the middle of the night. · Do not eat a big meal close to bedtime. If you are hungry, eat a light snack. · Do not drink a lot of water close to bedtime, because the need to urinate may wake you up during the night. · Do not read or watch TV in bed. Use the bed only for sleeping and sexual activity. What to try   · Go to bed at the same time every night, and wake up at the same time every morning. Do not take naps during the day. · Keep your bedroom quiet, dark, and cool. · Sleep on a comfortable pillow and mattress. · If watching the clock makes you anxious, turn it facing away from you so you cannot see the time. · If you worry when you lie down, start a worry book. Well before bedtime, write down your worries, and then set the book and your concerns aside. · Try meditation or other relaxation techniques before you go to bed. · If you cannot fall asleep, get up and go to another room until you feel sleepy. Do something relaxing. Repeat your bedtime routine before you go to bed again. · Make your house quiet and calm about an hour before bedtime. Turn down the lights, turn off the TV, log off the computer, and turn down the volume on music. This can help you relax after a busy day. When should you call for help? Watch closely for changes in your health, and be sure to contact your doctor if:  · Your efforts to improve your sleep do not work. · Your insomnia gets worse. · You have been feeling down, depressed, or hopeless or have lost interest in things that you usually enjoy. Where can you learn more? Go to https://Lifestyle [employer] Heritage [employer]ucid Software.[employer]. org and sign in to your MyCadbox account. Enter P513 in the [employer] box to learn more about \"Insomnia: Care Instructions. \"     If you do not have an account, please click on the \"Sign Up Now\" link. Current as of: December 16, 2019               Content Version: 12.5  © 7514-9194 Healthwise, Incorporated.    Care instructions adapted under license by [facility] ([facility]). If you have questions about a medical condition or this instruction, always ask your healthcare professional. [place] [address] any warranty or liability for your use of this information. Patient Education        Learning About Sleep Apnea  What is it? Sleep apnea means that breathing stops for short periods during sleep. When you stop breathing or have reduced airflow into your lungs during sleep, you don't sleep well and you can be very tired during the day. The oxygen levels in your blood may go down, and carbon dioxide levels go up. It may lead to other problems, such as high blood pressure and heart disease. Sleep apnea can range from mild to severe, based on how often breathing stops during sleep. Breathing may stop as few as 5 times an hour (mild apnea) to 30 or more times an hour (severe apnea). Obstructive sleep apnea is the most common type. This most often occurs because your airways are blocked or partly blocked. Central sleep apnea is less common. It happens when the brain has trouble controlling breathing. Some people have both types. That's called complex sleep apnea. What are the symptoms? There are symptoms of sleep apnea that you may notice and symptoms that others may notice when you're asleep. Symptoms you may notice include:  · Feeling extremely sleepy during the day. · Feeling unrefreshed or tired after a night's sleep. · Problems with memory and concentration, or mood changes. · Morning or night headaches. · Heartburn or a sour taste in your mouth at night. · Swelling of the legs. · Getting up often during the night to urinate. · A dry mouth or sore throat in the morning. Your bed partner may notice that you:  · Have episodes of not breathing. · Snore loudly. Almost all people who have sleep apnea snore. But not all people who snore have sleep apnea. · Toss and turn during sleep. · Have nighttime choking or gasping spells.   How is it diagnosed? Your doctor will probably do a physical exam and ask about your past health. He or she may also ask you or your bed partner about your snoring and sleep behavior and how tired you feel during the day. Your doctor may suggest a sleep study. Sleep studies are a series of tests that look at what happens to the body during sleep. They check for how often you stop breathing or have too little air flowing into your lungs during sleep. They also find out how much oxygen you have in your blood during sleep. A sleep study may take place in your home. Or it might take place at a sleep center, where you will spend the night. How is it treated? Sleep apnea is often treated with devices that deliver air through a mask to help keep your airways open. These include:  · Continuous positive airway pressure (CPAP). This increases air pressure in your throat. It keeps your airway open when you breathe in. It's the most common device. · Bilevel positive airway pressure (BiPAP). This uses different air pressures when you breathe in and out. · Adaptive servo ventilation (ASV). It senses pauses in breathing and adjusts air pressure. It's mostly used for central sleep apnea. If your tonsils or other tissues are blocking your airway, your doctor may suggest surgery to open the airway. How can you care for yourself? You may be able to treat mild sleep apnea by making changes in how you live and the way you sleep. For example, it may help to:  · Lose weight if you are overweight. · Sleep on your side, not your back. · Avoid alcohol and medicines such as sedatives before bed. You may also try an oral breathing device. It helps keep your airways open while you sleep. Where can you learn more? Go to https://mckinley.ItsMyURLs. org and sign in to your Only-apartments account. Enter S121 in the [employer] box to learn more about \"Learning About Sleep Apnea. \"     If you do not have an account, please click on the \"Sign Up Now\" link. Current as of: February 24, 2020               Content Version: 12.5  © 2006-2020 Healthwise, [facility]. Care instructions adapted under license by [facility] ([facility]). If you have questions about a medical condition or this instruction, always ask your healthcare professional. [address]  any warranty or liability for your use of this information. On a 9 inch plate half should be fresh vegetables. If not fresh then frozen. One quarter of the plate should be protein like pork, chicken, fish, eggs. In one quarter of food plates should be healthy carbohydrates like fresh blueberries, blackberries, raspberries, strawberries. If not berries, then whole grains like brown rice or quinoa. Avoid foods that are white or light brown in color other than cauliflower, eggs, cheese. Avoid sweetened drinks. Avoid all juices. Avoid highly processed and fried foods.

## 2020-09-17 NOTE — PROGRESS NOTES
Medicare Annual Wellness Visit  Name: Chris Fuller Date: 2020   MRN: [id number] Sex: Male   Age: 70 y.o. Ethnicity: Non-/Non    : 1945 Race: [place] is here for Medicare AWV    Screenings for behavioral, psychosocial and functional/safety risks, and cognitive dysfunction are all negative except as indicated below. These results, as well as other patient data from the [address] form, are documented in Flowsheets linked to this Encounter. Allergies   Allergen Reactions    Iodides Nausea And Vomiting     Contrast Dye    Oxycodone-Acetaminophen Nausea And Vomiting       Prior to Visit Medications    Medication Sig Taking? Authorizing Provider   lidocaine (LIDODERM) 5 % PLACE 1 PATCH ON THE SKIN DAILY, 12 HOURS ON AND 12 HOURS OFF  Josselyn Zapata MD   pantoprazole (PROTONIX) 40 MG tablet Take 1 tablet by mouth daily  Josselyn Zapata MD   lisinopril (PRINIVIL;ZESTRIL) 10 MG tablet Take 1 tablet by mouth daily  Josselyn Zapata MD   isosorbide mononitrate (IMDUR) 60 MG extended release tablet Take 2 tablets by mouth daily  Ty Pemberton MD   Magnesium 400 MG CAPS Take 1 capsule by mouth daily  Patient taking differently: Take 1 capsule by mouth nightly   Josselyn Zapata MD   atenolol (TENORMIN) 50 MG tablet Take 50 mg by mouth nightly   Historical Provider, MD   clopidogrel (PLAVIX) 75 MG tablet Take 75 mg by mouth daily   Historical Provider, MD   rosuvastatin (CRESTOR) 20 MG tablet Take 20 mg by mouth nightly   Historical Provider, MD   nitroGLYCERIN (NITROSTAT) 0.4 MG SL tablet Place 0.4 mg under the tongue every 5 minutes as needed for Chest pain up to max of 3 total doses. If no relief after 1 dose, call 911.   Historical Provider, MD   aspirin 81 MG tablet Take 81 mg by mouth daily  Historical Provider, MD   Cholecalciferol (VITAMIN D3) 1000 units TABS Take by mouth daily   Historical Provider, MD FIBER COMPLETE PO Take 1 capsule by mouth nightly   Historical Provider, MD   Cyanocobalamin (VITAMIN B-12) 3000 MCG SUBL Take by mouth daily   Historical Provider, MD       Past Medical History:   Diagnosis Date    Cancer ([address]     prostate    Hyperlipidemia     Hypertension        Past Surgical History:   Procedure Laterality Date    CARDIAC CATHETERIZATION  12/2019    CATARACT REMOVAL WITH IMPLANT Bilateral 06/2013    COLONOSCOPY  04/2016    CORONARY ANGIOPLASTY WITH STENT PLACEMENT  06/1998    CORONARY ANGIOPLASTY WITH STENT PLACEMENT  02/2007    CORONARY ANGIOPLASTY WITH STENT PLACEMENT  01/2009    CORONARY ANGIOPLASTY WITH STENT PLACEMENT  07/2012    HEMORRHOID SURGERY  11/2013    INGUINAL HERNIA REPAIR  05/1990    PROSTATE SURGERY  06/2003    removed    PROSTATE SURGERY  09/2013    biopsy    UPPER GASTROINTESTINAL ENDOSCOPY  09/2018    VASECTOMY         Family History   Problem Relation Age of Onset    Cancer Father         prostate    Heart Disease Father     Cancer Brother         prostate    Heart Disease Brother     Heart Disease Paternal Uncle        CareTeam (Including outside providers/suppliers regularly involved in providing care):   Patient Care Team:  Rach Salcido MD as PCP - General (Family Medicine)  Rach Salcido MD as PCP - [facility] Empaneled Provider    Wt Readings from Last 3 Encounters:   09/17/20 199 lb (90.3 kg)   09/17/20 199 lb (90.3 kg)   06/08/20 195 lb (88.5 kg)     Vitals:    09/17/20 0927   BP: 136/86   Site: Left Upper Arm   Position: Sitting   Cuff Size: Medium Adult   Pulse: 55   Resp: 20   Temp: 96.7 °F (35.9 °C)   TempSrc: Temporal   SpO2: 99%   Weight: 199 lb (90.3 kg)   Height: 5' 4\" (1.626 m)     Body mass index is 34.16 kg/m². Based upon direct observation of the patient, evaluation of cognition reveals recent and remote memory intact.       Patient's complete Health Risk Assessment and screening values have been reviewed and are found in [facility]. The following problems were reviewed today and where indicated follow up appointments were made and/or referrals ordered. Positive Risk Factor Screenings with Interventions:     General Health:  General  In general, how would you say your health is?: Good  In the past 7 days, have you experienced any of the following? New or Increased Pain, New or Increased Fatigue, Loneliness, Social Isolation, Stress or Anger?: (!) New or Increased Pain, Social Isolation(heel pain)  Do you get the social and emotional support that you need?: Yes  Do you have a Living Will?: Yes  General Health Risk Interventions:  · Pain issues: patient declines any further evaluation/treatment for this issue    Health Habits/Nutrition:  Health Habits/Nutrition  Do you exercise for at least 20 minutes 2-3 times per week?: Yes(5-7 days walking 2.5 miles)  Have you lost any weight without trying in the past 3 months?: No  Do you eat fewer than 2 meals per day?: No  Have you seen a dentist within the past year?: Yes  Body mass index is 34.16 kg/m². Health Habits/Nutrition Interventions:  · Nutritional issues:  educational materials for healthy, well-balanced diet provided    Hearing/Vision:  No exam data present  Hearing/Vision  Do you or your family notice any trouble with your hearing?: (!) Yes  Do you have difficulty driving, watching TV, or doing any of your daily activities because of your eyesight?: No  Have you had an eye exam within the past year?: Yes  Hearing/Vision Interventions:  · Hearing concerns:  patient declines any further evaluation/treatment for hearing issues, wears hearing aids    ADL:  ADLs  In the past 7 days, did you need help from others to perform any of the following everyday activities? Eating, dressing, grooming, bathing, toileting, or walking/balance?: None  In the past 7 days, did you need help from others to take care of any of the following?  Laundry, housekeeping, banking/finances, provided to the patient in written form: see Patient Instructions/AVS.    There are no diagnoses linked to this encounter.

## 2020-09-17 NOTE — PATIENT INSTRUCTIONS
Advance Directives: Care Instructions  Overview  An advance directive is a legal way to state your wishes at the end of your life. It tells your family and your doctor what to do if you can't say what you want. There are two main types of advance directives. You can change them any time your wishes change. Living will. This form tells your family and your doctor your wishes about life support and other treatment. The form is also called a declaration. Medical power of . This form lets you name a person to make treatment decisions for you when you can't speak for yourself. This person is called a health care agent (health care proxy, health care surrogate). The form is also called a durable power of  for health care. If you do not have an advance directive, decisions about your medical care may be made by a family member, or by a doctor or a  who doesn't know you. It may help to think of an advance directive as a gift to the people who care for you. If you have one, they won't have to make tough decisions by themselves. Follow-up care is a key part of your treatment and safety. Be sure to make and go to all appointments, and call your doctor if you are having problems. It's also a good idea to know your test results and keep a list of the medicines you take. What should you include in an advance directive? Many states have a unique advance directive form. (It may ask you to address specific issues.) Or you might use a universal form that's approved by many states. If your form doesn't tell you what to address, it may be hard to know what to include in your advance directive. Use the questions below to help you get started. · Who do you want to make decisions about your medical care if you are not able to? · What life-support measures do you want if you have a serious illness that gets worse over time or can't be cured? · What are you most afraid of that might happen? (Maybe you're afraid of having pain, losing your independence, or being kept alive by machines.)  · Where would you prefer to die? (Your home? A hospital? A nursing home?)  · Do you want to donate your organs when you die? · Do you want certain Anglican practices performed before you die? When should you call for help? Be sure to contact your doctor if you have any questions. Where can you learn more? Go to https://chpepiceweb.[email or web address].. org and sign in to your Renthackr account. Enter R264 in the Telera box to learn more about \"Advance Directives: Care Instructions. \"     If you do not have an account, please click on the \"Sign Up Now\" link. Current as of: December 9, 2019               Content Version: 12.5  © 1576-1267 [employer]. Care instructions adapted under license by [facility] [address] If you have questions about a medical condition or this instruction, always ask your healthcare professional. [place] [address] any warranty or liability for your use of this information. Learning About Medical Power of   What is a medical power of ? A medical power of , also called a durable power of  for health care, is one type of the legal forms called advance directives. It lets you name the person you want to make treatment decisions for you if you can't speak or decide for yourself. The person you choose is called your health care agent. This person is also called a health care proxy or health care surrogate. A medical power of  may be called something else in your state. How do you choose a health care agent? Choose your health care agent carefully. This person may or may not be a family member. Talk to the person before you make your final decision. Make sure he or she is comfortable with this responsibility. It's a good idea to choose someone who:  · Is at least 25years old.   · Knows you well and understands what makes life meaningful for you. · Understands your Restorationist and moral values. · Will do what you want, not what he or she wants. · Will be able to make difficult choices at a stressful time. · Will be able to refuse or stop treatment, if that is what you would want, even if you could die. · Will be firm and confident with health professionals if needed. · Will ask questions to get needed information. · Lives near you or agrees to travel to you if needed. Your family may help you make medical decisions while you can still be part of that process. But it's important to choose one person to be your health care agent in case you aren't able to make decisions for yourself. If you don't fill out the legal form and name a health care agent, the decisions your family can make may be limited. A health care agent may be called something else in your state. Who will make decisions for you if you don't have a health care agent? If you don't have a health care agent or a living will, you may not get the care you want. Decisions may be made by family members who disagree about your medical care. Or decisions may be made by a medical professional who doesn't know you well. In some cases, a  makes the decisions. When you name a health care agent, it is very clear who has the power to make health decisions for you. How do you name a health care agent? You name your health care agent on a legal form. This form is usually called a medical power of . Ask your hospital, state bar association, or office on aging where to find these forms. You must sign the form to make it legal. Some states require you to get the form notarized. This means that a person called a  watches you sign the form and then he or she signs the form. Some states also require that two or more witnesses sign the form. Be sure to tell your family members and doctors who your health care agent is.   Where can you learn more? Go to https://chpepiceweb.[email or web address] org and sign in to your AutoRadio account. Enter [id number] in the [facility] box to learn more about \"Learning About [address]. \"     If you do not have an account, please click on the \"Sign Up Now\" link. Current as of: December 9, 2019               Content Version: 12.5  © 3264-2055 [employer]. Care instructions adapted under license by [facility] ([facility]). If you have questions about a medical condition or this instruction, always ask your healthcare professional. [address] any warranty or liability for your use of this information. Learning About Hernando Anand  What is a living will? A living will, also called a declaration, is a legal form. It tells your family and your doctor your wishes when you can't speak for yourself. It's used by the health professionals who will treat you as you near the end of your life or if you get seriously hurt or ill. If you put your wishes in writing, your loved ones and others will know what kind of care you want. They won't need to guess. This can ease your mind and be helpful to others. And you can change or cancel your living will at any time. A living will is not the same as an estate or property will. An estate will explains what you want to happen with your money and property after you die. How do you use it? A living will is used to describe the kinds of treatment or life support you want as you near the end of your life or if you get seriously hurt or ill. Keep these facts in mind about living starks. · Your living will is used only if you can't speak or make decisions for yourself. Most often, one or more doctors must certify that you can't speak or decide for yourself before your living will takes effect. · If you get better and can speak for yourself again, you can accept or refuse any treatment.  It doesn't matter what you said in your living will. · Some states may limit your right to refuse treatment in certain cases. For example, you may need to clearly state in your living will that you don't want artificial hydration and nutrition, such as being fed through a tube. Is a living will a legal document? A living will is a legal document. Each state has its own laws about living starks. And a living will may be called something else in your state. Here are some things to know about living starks. · You don't need an  to complete a living will. But legal advice can be helpful if your state's laws are unclear. It can also help if your health history is complicated or your family can't agree on what should be in your living will. · You can change your living will at any time. Some people find that their wishes about end-of-life care change as their health changes. If you make big changes to your living will, complete a new form. · If you move to another state, make sure that your living will is legal in the state where you now live. In most cases, doctors will respect your wishes even if you have a form from a different state. · You might use a universal form that has been approved by many states. This kind of form can sometimes be filled out and stored online. Your digital copy will then be available wherever you have a connection to the internet. The doctors and nurses who need to treat you can find it right away. · Your state may offer an online registry. This is another place where you can store your living will online. · It's a good idea to get your living will notarized. This means using a person called a  to watch two people sign, or witness, your living will. What should you know when you create a living will? Here are some questions to ask yourself as you make your living will:  · Do you know enough about life support methods that might be used?  If not, talk to your doctor so you know what might be done if you can increase your chances of quitting for good. · Limit alcohol to 2 drinks a day for men and 1 drink a day for women. Too much alcohol can cause health problems. If you have a BMI higher than 25  · Your doctor may do other tests to check your risk for weight-related health problems. This may include measuring the distance around your waist. A waist measurement of more than 40 inches in men or 35 inches in women can increase the risk of weight-related health problems. · Talk with your doctor about steps you can take to stay healthy or improve your health. You may need to make lifestyle changes to lose weight and stay healthy, such as changing your diet and getting regular exercise. If you have a BMI lower than 18.5  · Your doctor may do other tests to check your risk for health problems. · Talk with your doctor about steps you can take to stay healthy or improve your health. You may need to make lifestyle changes to gain or maintain weight and stay healthy, such as getting more healthy foods in your diet and doing exercises to build muscle. Where can you learn more? Go to https://7 Elements StudiospeAll Def Digital.Deja View Concepts. org and sign in to your [email or web address] account. Enter S176 in the [facility] box to learn more about \"Body Mass Index: Care Instructions. \"     If you do not have an account, please click on the \"Sign Up Now\" link. Current as of: December 11, 2019               Content Version: 12.5  © 1740-8091 Healthwise, Incorporated. Care instructions adapted under license by [facility] ([facility]). If you have questions about a medical condition or this instruction, always ask your healthcare professional. [place] [address] any warranty or liability for your use of this information. Learning About Healthy Weight  What is a healthy weight? A healthy weight is the weight at which you feel good about yourself and have energy for work and play.  It's also one that lowers your risk for health problems. What can you do to stay at a healthy weight? It can be hard to stay at a healthy weight, especially when fast food, vending-machine snacks, and processed foods are so easy to find. And with your busy lifestyle, activity may be low on your list of things to do. But staying at a healthy weight may be easier than you think. Here are some dos and don'ts for staying at a healthy weight:  Do eat healthy foods  The kinds of foods you eat have a big impact on both your weight and your health. Reaching and staying at a healthy weight is not about going on a diet. It's about making healthier food choices every day and changing your diet for good. Healthy eating means eating a variety of foods so that you get all the nutrients you need. Your body needs protein, carbohydrate, and fats for energy. They keep your heart beating, your brain active, and your muscles working. On most days, try to eat from each food group. This means eating a variety of:  · Whole grains, such as whole wheat breads and pastas. · Fruits and vegetables. · Dairy products, such as low-fat milk, yogurt, and cheese. · Lean proteins, such as all types of fish, chicken without the skin, and beans. Don't have too much or too little of one thing. All foods, if eaten in moderation, can be part of healthy eating. Even sweets can be okay. If your favorite foods are high in fat, salt, sugar, or calories, limit how often you eat them. Eat smaller servings, or look for healthy substitutes. Do watch what you eat  Many people eat more than their bodies need. Part of staying at a healthy weight means learning how much food you really need from day to day and not eating more than that. Even with healthy foods, eating too much can make you gain weight. Having a well-balanced diet means that you eat enough, but not too much, and that your food gives you the nutrients you need to stay healthy. So listen to your body. Eat when you're hungry.  Stop when you feel satisfied. It's a good idea to have healthy snacks ready for when you get hungry. Keep healthy snacks with you at work, in your car, and at home. If you have a healthy snack easily available, you'll be less likely to pick a candy bar or bag of chips from a vending machine instead. Some healthy snacks you might want to keep on hand are fruit, low-fat yogurt, string cheese, low-fat microwave popcorn, raisins and other dried fruit, nuts, whole wheat crackers, pretzels, carrots, celery sticks, and broccoli. Do some physical activity   A big part of reaching and staying at a healthy weight is being active. When you're active, you burn calories. This makes it easier to reach and stay at a healthy weight. When you're active on a regular basis, your body burns more calories, even when you're at rest. Being active helps you lose fat and build lean muscle. Try to be active for at least 1 hour every day. This may sound like a lot, but it's okay to be active in smaller blocks of time that add up to 1 hour a day. Any activity that makes your heart beat faster and keeps it there for a while counts. A brisk walk, run, or swim will get your heart beating faster. So will climbing stairs, shooting baskets, or cycling. Even some household chores like vacuuming and mowing the lawn will get your heart rate up. Pick activities that you enjoy--ones that make your heart beat faster, your muscles stronger, and your muscles and joints more flexible. If you find more than one thing you like doing, do them all. You don't have to do the same thing every day. Don't diet  Diets don't work. Diets are temporary. Because you give up so much when you diet, you may be hungry and think about food all the time. And after you stop dieting, you also may overeat to make up for what you missed. Most people who diet end up gaining back the pounds they lost--and more. Remember that healthy bodies come in lots of shapes and sizes.  Everyone can get healthier by eating better and being more active. Where can you learn more? Go to https://chpepiceweb.[email or web address]. org and sign in to your "[employer]" account. Enter [id number] in the [facility] box to learn more about \"Learning About Healthy Weight. \"     If you do not have an account, please click on the \"Sign Up Now\" link. Current as of: December 11, 2019               Content Version: 12.5  © 1368-7375 Healthwise, Incorporated. Care instructions adapted under license by [facility] ([facility]). If you have questions about a medical condition or this instruction, always ask your healthcare professional. [address] any warranty or liability for your use of this information. Learning About Low-Carbohydrate Diets  What is a low-carbohydrate diet? A low-carbohydrate (or \"low-carb\") diet limits foods and drinks that have carbohydrates. This includes grains, fruits, milk and yogurt, and starchy vegetables like potatoes, beans, and corn. It also avoids foods and drinks that have added sugar. Instead, low-carb diets include foods that are high in protein and fat. Why might you follow a low-carb diet? Low-carb diets may be used for a variety of reasons, such as for weight loss. People who have diabetes may use a low-carb diet to help manage their blood sugar levels. What should you do before you start the diet? Talk to your doctor before you try any diet. This is even more important if you have health problems like kidney disease, heart disease, or diabetes. Your doctor may suggest that you meet with a registered dietitian. A dietitian can help you make an eating plan that works for you. What foods do you eat on a low-carb diet? On a low-carb diet, you choose foods that are high in protein and fat. Examples of these are:  · Meat, poultry, and fish. · Eggs. · Nuts, such as walnuts, pecans, almonds, and peanuts.   · Peanut butter and other nut butters. · Tofu. · Avocado. · Gutierrez Cheko. · Non-starchy vegetables like broccoli, cauliflower, green beans, mushrooms, peppers, lettuce, and spinach. · Unsweetened non-dairy milks like almond milk and coconut milk. · Cheese, cottage cheese, and cream cheese. Current as of: August 22, 2019               Content Version: 12.5  © 3698-4050 Healthwise, [facility]. Care instructions adapted under license by [facility] ([facility]). If you have questions about a medical condition or this instruction, always ask your healthcare professional. [place] [address] any warranty or liability for your use of this information. Personalized Preventive Plan for Danni Must - 9/17/2020  Medicare offers a range of preventive health benefits. Some of the tests and screenings are paid in full while other may be subject to a deductible, co-insurance, and/or copay. Some of these benefits include a comprehensive review of your medical history including lifestyle, illnesses that may run in your family, and various assessments and screenings as appropriate. After reviewing your medical record and screening and assessments performed today your provider may have ordered immunizations, labs, imaging, and/or referrals for you. A list of these orders (if applicable) as well as your Preventive Care list are included within your After Visit Summary for your review. Other Preventive Recommendations:    · A preventive eye exam performed by an eye specialist is recommended every 1-2 years to screen for glaucoma; cataracts, macular degeneration, and other eye disorders. · A preventive dental visit is recommended every 6 months. · Try to get at least 150 minutes of exercise per week or 10,000 steps per day on a pedometer . · Order or download the FREE \"Exercise & Physical Activity: Your Everyday Guide\" from [employer]. Call 2-355.748.8396 or search [employer] online.   · You need 2471-2983 mg of calcium and 7859-5606 IU of vitamin D per day. It is possible to meet your calcium requirement with diet alone, but a vitamin D supplement is usually necessary to meet this goal.  · When exposed to the sun, use a sunscreen that protects against both UVA and UVB radiation with an SPF of 30 or greater. Reapply every 2 to 3 hours or after sweating, drying off with a towel, or swimming. · Always wear a seat belt when traveling in a car. Always wear a helmet when riding a bicycle or motorcycle. Patient information: Weight loss treatments    INTRODUCTION -- Obesity is a major international problem, and Americans are among the heaviest people in the world. The percentage of obese people in the [place] has risen steadily. Many people find that although they initially lose weight by dieting, they quickly regain the weight after the diet ends. Because it so hard to keep weight off over time, it is important to have as much information and support as possible before starting a diet. You are most likely to be successful in losing weight and keeping it off when you believe that your body weight can be controlled. STARTING A WEIGHT LOSS PROGRAM -- Some people like to talk to their doctor or nurse to get help choosing the best plan, monitoring progress, and getting advice and support along the way. To know what treatment (or combination of treatments) will work best, determine your body mass index (BMI) and waist circumference (measurement). The BMI is calculated from your height and weight. A person with a BMI between 25 and 29.9 is considered overweight   A person with a BMI of 30 or greater is considered to be obese  A waist circumference greater than 35 inches (88 cm) in women and 40 inches (102 cm) in men increases the risk of obesity-related complications, such as heart disease and diabetes.  People who are obese and who have a larger waist size may need more aggressive weight loss treatment than before you go, such as eating before you go or taking low-calorie snacks and drinks with you. Develop a support system -- Having a support system is helpful when losing weight. This is why many commercial groups are successful. Family support is also essential; if your family does not support your efforts to lose weight, this can slow your progress or even keep you from losing weight. Positive thinking -- People often have conversations with themselves in their head; these conversations can be positive or negative. If you eat a piece of cake that was not planned, you may respond by thinking, \"Oh, you stupid idiot, you've blown your diet! \" and as a result, you may eat more cake. A positive thought for the same event could be, \"Well, I ate cake when it was not on my plan. Now I should do something to get back on track. \" A positive approach is much more likely to be successful than a negative one. Reduce stress -- Although stress is a part of everyday life, it can trigger uncontrolled eating in some people. It is important to find a way to get through these difficult times without eating or by eating low-calorie food, like raw vegetables. It may be helpful to imagine a relaxing place that allows you to temporarily escape from stress. With deep breaths and closed eyes, you can imagine this relaxing place for a few minutes. Self-help programs -- Self-help programs like [employer] [place] Watchers®, Overeaters Anonymous®, and Take Off [place] (TOPS)© work for some people. As with all weight loss programs, you are most likely to be successful with these plans if you make long-term changes in how you eat. CHOOSING A DIET -- A calorie is a unit of energy found in food. Your body needs calories to function. The goal of any diet is to burn up more calories than you eat. How quickly you lose weight depends upon several factors, such as your age, gender, and starting weight.   Older people have a slower metabolism than diet -- The term \"Mediterranean diet\" refers to a way of eating that is common in olive-growing regions around the [facility]. Although there is some variation in Mediterranean diets, there are some similarities. Most Mediterranean diets include:  A high level of monounsaturated fats (from olive or canola oil, walnuts, pecans, almonds) and a low level of saturated fats (from butter)   A high amount of vegetables, fruits, legumes, and grains (7 to 10 servings of fruits and vegetables per day)   A moderate amount of milk and dairy products, mostly in the form of cheese. Use low-fat dairy products (skim milk, fat-free yogurt, low-fat cheese). A relatively low amount of red meat and meat products. Substitute fish or poultry for red meat. For those who drink alcohol, a modest amount (mainly as red wine) may help to protect against cardiovascular disease. A modest amount is up to one (4 ounce) glass per day for women and up to two glasses per day for men. Which diet is best? -- Studies have compared different diets, including:  Very-low-carbohydrate (Atkins)   Macronutrient balance controlling glycemic load (Zone®)   Reduced-calorie (Weight Watchers®)   Very-low-fat (Ornish)  No one diet is \"best\" for weight loss. Any diet will help you to lose weight if you stick with the diet. Therefore, it is important to choose a diet that includes foods you like. Fad diets -- Fad diets often promise quick weight loss (more than 1 to 2 pounds per week) and may claim that you do not need to exercise or give up favorite foods. Some fad diets cost a lot of money, because you have to pay for seminars or pills. Fad diets generally lack any scientific evidence that they are safe and effective, but instead rely on \"before\" and \"after\" photos or testimonials. Diets that sound too good to be true usually are. These plans are a waste of time and money and are not recommended.  A doctor, nurse, or nutritionist can help you find a available without a prescription (Luis E® 60 mg capsules) in many countries, including the [place]. The medicine is recommended three times per day, taken with a meal; you can skip a dose if you skip a meal or if the meal contains no fat. After one year of treatment with orlistat, the average weight loss is approximately 8 to 10 percent of initial body weight (4 percent more than in those who took a placebo). Cholesterol levels often improve, and blood pressure sometimes falls. In people with diabetes, orlistat may help control blood sugar levels. Side effects occur in 15 to 10 percent of people and may include stomach cramps, gas, diarrhea, leakage of stool, or oily stools. These problems are more likely when you take orlistat with a high-fat meal (if more than 30 percent of calories in the meal are from fat). Side effects usually improve as you learn to avoid high-fat foods. Severe liver injury has been reported rarely in patients taking orlistat, but it is not known if orlistat caused the liver problems. Diet supplements -- Diet supplements are widely used by people who are trying to lose weight, although the safety and efficacy of these supplements are often unproven. A few of the more common diet supplements are discussed below; none of these are recommended because they have not been studied carefully, and there is no proof they are safe or effective. Chitosan and wheat dextrin are ineffective for weight loss, and their use is not recommended. Ephedra, a compound related to ephedrine, is no longer available in the [place] due to safety concerns. Many nonprescription diet pills previously contained ephedra. Although some studies have shown that ephedra helps with weight loss, there can be serious side effects (psychiatric symptoms, palpitations, and stomach upset), including death.    There are not enough data about the safety and efficacy of chromium, ginseng, glucomannan, green tea, hydroxycitric acid, L carnitine, psyllium, pyruvate supplements, Rosman wort, and conjugated linoleic acid. Two supplements from [facility], [address] [place] (also known as the Hollinsshayan Adkins 15 pill) and Herbathin dietary supplement, have been shown to contain prescription drugs. Hoodia gordonii is a dietary supplement derived from a plant in [place]. It is not recommended because there is no proof that it is safe or effective. Bitter orange (Citrus aurantium) can increase your heart rate and blood pressure and is not recommended. SHOULD I HAVE SURGERY TO LOSE WEIGHT? -- Weight loss surgery is recommended ONLY for people with one of the following:  Severe obesity (body mass index above 40) (calculator 1 and calculator 2) who have not responded to diet, exercise, or weight loss medicines   Body mass index between 35 and 40, along with a serious medical problem (including diabetes, severe joint pain, or sleep apnea) that would improve with weight loss  You should be sure that you understand the potential risks and benefits of weight loss surgery. You must be motivated and willing to make lifelong changes in how you eat to reach and maintain a healthier weight after surgery. You must also be realistic about weight loss after surgery (see 'Effectiveness of weight loss surgery' below). PREPARING FOR WEIGHT LOSS SURGERY -- Most people who have weight loss surgery will meet with several specialists before surgery is scheduled. This often includes a dietitian, mental health counselor, a doctor who specializes in care of obese people, and a surgeon who performs weight loss surgery (bariatric surgeon). You may need to work with these providers for several weeks or months before surgery. The nutritionist will explain what and how much you will be able to eat after surgery. You may also need to lose a small amount of weight before surgery.    The mental health specialist will help you to cope with stress and other factors that can make it harder to lose weight or trigger you to eat   The medical doctor will determine whether you need other tests, counseling, or treatment before surgery. He or she might also help you begin a medical weight loss program so that you can lose some weight before surgery. The bariatric surgeon will meet with you to discuss the surgeries available to treat obesity. He or she will also make sure you are a good candidate for surgery. TYPES OF WEIGHT LOSS SURGERY -- There are several types of weight loss surgeries, the most common being lap banding, gastric bypass, and gastric sleeve. Lap banding -- Laparoscopic adjustable gastric banding (LAGB), or lap banding, is a surgery that uses an adjustable band around the opening to the stomach (figure 1). This reduces the amount of food that you can eat at one time. Lap banding is done through small incisions, with a laparoscope. The band can be adjusted after surgery, allowing you to eat more or less food. Adjustments to the size and tightness of the band are made by using a needle to add or remove fluid from a port (a small container under the skin that is connected to the band). Adding fluid to the band makes it tighter which restricts the amount of food you can eat and may help you to lose more weight. Lap banding is a popular choice because it is relatively simple to perform, can be adjusted or removed, and has a low risk of serious complications immediately after surgery. However, weight loss with the lap band depends on your ability to follow the program closely. You will need to prepare nutritious meals that [facility] with\" the band, not against it. For example, the lap band will not work well if you eat or drink a large amount of liquid calories (like ice cream). The band will not help you to feel full when you eat/drink liquid calories. Weight loss ranges from 45 to 75 percent after two years.  As an example, a person who is 120 pounds overweight could than gastric bypass because the intestines are not rearranged, and there is less chance of malnutrition. It also appears to control hunger better than lap banding. It might be safer than the lap banding because no foreign materials are used. The gastric sleeve has a good success rate, and people lose an average of 33 percent of their excess body weight in the first year. For a person who is 120 pounds overweight, this would mean losing about 40 pounds in the first year. WEIGHT LOSS SURGERY COMPLICATIONS -- A variety of complications can occur with weight loss surgery. The risks of surgery depend upon which surgery you have and any medical problems you had before surgery. Some of the more common early surgical complications (one to six weeks after surgery) include:  Bleeding   Infection   Blockage or tear in the bowels   Need for further surgery  Important medical complications after surgery can include blood clots in the legs or lungs, heart attack, pneumonia, and urinary tract infection. Complications are less likely when surgery is performed in centers that are experienced in weight loss surgery. In general, centers with experience in weight loss surgery have:  Board-certified doctors and surgeons   A team of support staff (dietitians, counselors, nurses)   Long-term follow-up after surgery   Hospital staff experienced with the care of weight loss patients. This includes nurses who are trained in the care of patients immediately after surgery and anesthesiologists who are experienced in caring for the morbidly obese. EFFECTIVENESS OF WEIGHT LOSS SURGERY -- The goal of weight loss surgery is to reduce the risk of illness or death associated with obesity. Weight loss surgery can also help you to feel and look better, reduce the amount of money you spend on medicines, and cut down on sick days.    As an example, weight loss surgery can improve health problems related to obesity (diabetes, high blood pressure, high cholesterol, sleep apnea) to the point that you need less or no medicine. Finally, weight loss surgery might reduce your risk of developing heart disease, cancer, and certain infections. AFTER WEIGHT LOSS SURGERY -- You will need to stay in the hospital until your team feels that it is safe for you to leave (on average, one to three days). Do not drive if you are taking prescription pain medicine. Begin exercising as soon as possible once you have healed; most weight loss centers will design an exercise program for you. Once you are home, it is important to eat and drink exactly what your doctor and dietitian recommend. You will see your doctor, nurse, and dietitian on a regular basis after surgery to monitor your health, diet, and weight loss. You will be able to slowly increase how much you eat over time, although it will always be important to:  Eat small, frequent meals and not skip meals   Chew your food slowly and completely   Avoid eating while \"distracted\" (such as eating while watching TV)   Stop eating when you feel full   Drink liquids at least 30 minutes before or after eating   Avoid foods high in fat or sugar   Take vitamin supplements, as recommended  It can take several months to learn to listen to your body so that you know when you are hungry and when you are full. You may dislike foods you previously loved, and you may begin to prefer new foods. This can be a frustrating process for some people, so talk to your dietitian if you are having trouble. It usually takes between one and two years to lose weight after surgery. After reaching their goal weight, some people have plastic surgery (called \"body contouring\") to remove excess skin from the body, particularly in the abdominal area. Before you decide to have weight loss surgery, you must commit to staying healthy for life.  This includes following up with your healthcare team, exercising most days of the week, and eating a sensible diet every day. It can be difficult to develop new eating and exercise habits after weight loss surgery, and you will have to work hard to stick to your goals. Recovering from surgery and losing weight can be stressful and emotional, and it is important to have the support of family and friends. Working with a , therapist, or support group can help you through the ups and downs. WHERE TO GET MORE INFORMATION -- Your healthcare provider is the best source of information for questions and concerns related to your medical problem. This article will be updated as needed every four months on our Web site ([email or web address])  ·     High-Fiber Diet     What Is Fiber? Dietary fiber is a form of carbohydrate found in plants that cannot be digested by humans. All plants contain fiber, including fruits, vegetables, grains, and legumes. Fiber is often classified into two categories: soluble and insoluble. Soluble fiber draws water into the bowel and can help slow digestion. Examples of foods that are high in soluble fiber include oatmeal, oat bran, barley, legumes (eg, beans and peas), apples, and strawberries. Insoluble fiber speeds digestion and can add bulk to the stool. Examples of foods that are high in insoluble fiber include whole-wheat products, wheat bran, cauliflower, green beans, and potatoes. Why Follow a High-Fiber Diet? A high-fiber diet is often recommended to prevent and treat constipation , hemorrhoids , diverticulitis , and irritable bowel syndrome . Eating a high-fiber diet can also help improve your cholesterol levels, lower your risk of coronary heart disease , reduce your risk of type 2 diabetes , and lower your weight. For people with type 1 or 2 diabetes, a high-fiber diet can also help stabilize blood sugar levels. How Much Fiber Should I Eat? A high-fiber diet should contain  20-35 grams  of fiber a day.  This is actually the amount recommended for the general adult population; however, most Americans eat only 15 grams of fiber per day. Digestion of Fiber   Eating a higher fiber diet than usual can take some getting used to by your body's digestive system. To avoid the side effects of sudden increases in dietary fiber (eg, gas, cramping, bloating, and diarrhea), increase fiber gradually and be sure to drink plenty of fluids every day. Tips for Increasing Fiber Intake   Whenever possible, choose whole grains over refined grains (eg, brown rice instead of white rice, whole-wheat bread instead of white bread). Include a variety of grains in your diet, such as wheat, rye, barley, oats, quinoa, and bulgur. Eat more vegetarian-based meals. Here are some ideas: black bean burgers, eggplant lasagna, and veggie tofu stir-parks. Choose high-fiber snacks, such as fruits, popcorn, whole-grain crackers, and nuts. Make whole-grain cereal or whole-grain toast part of your daily breakfast regime. When eating out, whether ordering a sandwich or dinner, ask for extra vegetables. When baking, replace part of the white flour with whole-wheat flour. Whole-wheat flour is particularly easy to incorporate into a recipe. High-Fiber Diet Eating Guide   Food Category   Foods Recommended   Notes   Grains   Whole-grain breads, muffins, bagels, or esther bread Rye bread Whole-wheat crackers or crisp breads Whole-grain or bran cereals Oatmeal, oat bran, or grits Wheat germ Whole-wheat pasta and brown rice   Read the ingredients list on food labels. Look for products that list \"whole\" as the first ingredient (eg, whole-wheat, whole oats). Choose cereals with at least 2 grams of fiber per serving.    Vegetables   All vegetables, especially asparagus, bean sprouts, broccoli, [place] sprouts, cabbage, carrots, cauliflower, celery, corn, greens, green beans, green pepper, onions, peas, potatoes (with skin), snow peas, spinach, squash, sweet potatoes, tomatoes, zucchini   For maximum fiber intake, eat the peels of fruits and vegetablesjust be sure to wash them well first.   Fruits   All fruits, especially apples, berries, grapefruits, mangoes, nectarines, oranges, peaches, pears, dried fruits (figs, dates, prunes, raisins)   Choose raw fruits and vegetables over juice, cooked, or cannedraw fruit has more fiber. Dried fruit is also a good source of fiber. Milk   With the exception of yogurt containing inulin (a type of fiber), dairy foods provide little fiber. Add more fiber by topping your yogurt or cottage cheese with fresh fruit, whole grain or bran cereals, nuts, or seeds. Meats and Beans   All beans and peas, especially Garbanzo beans, kidney beans, lentils, lima beans, split peas, and crews beans All nuts and seeds, especially almonds, peanuts, [place] nuts, cashews, peanut butter, walnuts, sesame and sunflower seeds All meat, poultry, fish, and eggs   Increase fiber in meat dishes by adding crews beans, kidney beans, black-eyed peas, bran, or oatmeal. If you are following a low-fat diet, use nuts and seeds only in moderation. Fats and Oils   All in moderation   Fats and oils do not provide fiber   Snacks, Sweets, and Condiments   Fruit Nuts Popcorn, whole-wheat pretzels, or trail mix made with dried fruits, nuts, and seeds Cakes, breads, and cookies made with oatmeal or whole-wheat flour   Most snack foods do not provide much fiber. Choose snacks with at least 2 grams of fiber per serving. Last Reviewed: March 2011 Pablo Ogden MS, MPH, RD   Updated: 3/29/2011   ·     Keep Your Memory Lily Parikh       Many factors can affect your ability to remembera hectic lifestyle, aging, stress, chronic disease, and certain medicines. But, there are steps you can take to sharpen your mind and help preserve your memory. Challenge Your Brain   Regularly challenging your mind may help keeps it in top shape.  Good mental exercises include:   Crossword puzzlesUse a dictionary if you need it; you will learn more that way. Brainteasers Try some! Crafts, such as wood working and sewing   Hobbies, such as gardening and building model airplanes   SocializingVisit old friends or join groups to meet new ones. Reading   Learning a new language   Taking a class, whether it be art history or anabel chi   TravelingExperience the food, history, and culture of your destination   Learning to use a computer   Going to museums, the theater, or thought-provoking movies   Changing things in your daily life, such as reversing your pattern in the grocery store or brushing your teeth using your nondominant hand   Use Memory Aids   There is no need to remember every detail on your own. These memory aids can help:   Calendars and day planners   Electronic organizers to store all sorts of helpful informationThese devices can \"beep\" to remind you of appointments. A book of days to record birthdays, anniversaries, and other occasions that occur on the same date every year   Detailed \"to-do\" lists and strategically placed sticky notes   Quick \"study\" sessionsBefore a gathering, review who will be there so their names will be fresh in your mind. Establish routinesFor example, keep your keys, wallet, and umbrella in the same place all the time or take medicine with your 8:00 AM glass of juice   Live a Healthy Life   Many actions that will keep your body strong will do the same for your mind. For example:   Talk to Your Doctor About Herbs and Supplements    Malnutrition and vitamin deficiencies can impair your mental function. For example, vitamin B12 deficiency can cause a range of symptoms, including confusion. But, what if your nutritional needs are being met? Can herbs and supplements still offer a benefit? Researchers have investigated a range of natural remedies, such as ginkgo , ginseng , and the supplement phosphatidylserine (PS).  So far, though, the evidence is inconsistent as to whether these products can improve memory or thinking. If you are interested in taking herbs and supplements, talk to your doctor first because they may interact with other medicines that you are taking. Exercise Regularly    Among the many benefits of regular exercise are increased blood flow to the brain and decreased risk of certain diseases that can interfere with memory function. One study found that even moderate exercise has a beneficial effect. Examples of \"moderate\" exercise include:   Playing 18 holes of golf once a week, without a cart   Playing tennis twice a week   Walking one mile per day   Manage Stress    It can be tough to remember what is important when your mind is cluttered. Make time for relaxation. Choose activities that calm you down, and make it routine. Manage Chronic Conditions    Side effects of high blood pressure , diabetes, and heart disease can interfere with mental function. Many of the lifestyle steps discussed here can help manage these conditions. Strive to eat a healthy diet, exercise regularly, get stress under control, and follow your doctor's advice for your condition. Minimize Medications    Talk to your doctor about the medicines that you take. Some may be unnecessary. Also, healthy lifestyle habits may lower the need for certain drugs. Last Reviewed: April 2010 Roxie Mckay MD   Updated: 4/13/2010   ·     [address]       As we get older, changes in balance, gait, strength, vision, hearing, and cognition make even the most youthful senior more prone to accidents. Falls are one of the leading health risks for older people.  This increased risk of falling is related to:   Aging process (eg, decreased muscle strength, slowed reflexes)   Higher incidence of chronic health problems (eg, arthritis, diabetes) that may limit mobility, agility or sensory awareness   Side effects of medicine (eg, dizziness, blurred vision)especially medicines like prescription pain medicines and drugs used to treat can secure them to the floor with a special double-sided carpet tape. Are smoke detectors properly locatedone on every floor of your home and one outside of every sleeping area? Are they in good working order? Are batteries replaced at least once a year? Do you have a well-maintained carbon monoxide detector outside every sleeping are in your home? Does your furniture layout leave plenty of space to maneuver between and around chairs, tables, beds, and sofas? Are hallways, stairs and passages between rooms well lit? Can you reach a lamp without getting out of bed? Are floor surfaces well maintained? Shag rugs, high-pile carpeting, tile floors, and polished wood floors can be particularly slippery. Stairs should always have handrails and be carpeted or fitted with a non-skid tread. Is your telephone easily reachable. Is the cord safely tucked away? Room by Room   According to the Association of Aging, bathrooms and bryce are the two most potentially hazardous rooms in your home. In the Kitchen    Be sure your stove is in proper working order and always make sure burners and the oven are off before you go out or go to sleep. Keep pots on the back burners, turn handles away from the front of the stove, and keep stove clean and free of grease build-up. Kitchen ventilation systems and range exhausts should be working properly. Keep flammable objects such as towels and pot holders away from the cooking area except when in use. Make sure kitchen curtains are tied back. Move cords and appliances away from the sink and hot surfaces. If extension cords are needed, install wiring guides so they do not hang over the sink, range, or working areas. Look for coffee pots, kettles and toaster ovens with automatic shut-offs. Keep a mop handy in the kitchen so you can wipe up spills instantly. You should also have a small fire extinguisher.     Arrange your kitchen with frequently used items on importance of checking basements, garages, workshops and storage areas for fire hazards, such as volatile liquids, piles of old rags or clothing and overloaded circuits. Never smoke in bed or when lying down on a couch or recliner chair. Small portable electric or kerosene heaters are responsible for many home fires and should be used cautiously if at all. If you do use one, be sure to keep them away from flammable materials. In case of fire, make sure you have a pre-established emergency exit plan. Have a professional check your fireplace and other fuel-burning appliances yearly. Helping Hands   Baby boomers entering the catalan years will continue to see the development of new products to help older adults live safely and independently in spite of age-related changes. Making Life More Livable  , by Kaelyn Burrell, lists over 1,000 products for \"living well in the mature years,\" such as bathing and mobility aids, household security devices, ergonomically designed knives and peelers, and faucet valves and knobs for temperature control. Medical supply stores and organizations are good sources of information about products that improve your quality of life and insure your safety.      Last Reviewed: November 2009 Priya Lawson MD   Updated: 3/7/2011     ·

## 2020-09-22 ENCOUNTER — HOSPITAL ENCOUNTER (OUTPATIENT)
Dept: CT IMAGING | Age: 75
Discharge: HOME | End: 2020-09-24
Payer: MEDICARE

## 2020-09-22 PROCEDURE — G0297 LDCT FOR LUNG CA SCREEN: HCPCS

## 2020-09-25 ENCOUNTER — VIRTUAL VISIT (OUTPATIENT)
Dept: GASTROENTEROLOGY | Age: 75
End: 2020-09-25
Payer: MEDICARE

## 2020-09-25 PROCEDURE — 99204 OFFICE O/P NEW MOD 45 MIN: CPT | Performed by: NURSE PRACTITIONER

## 2020-09-25 RX ORDER — FAMOTIDINE 40 MG/1
40 TABLET, FILM COATED ORAL NIGHTLY PRN
Qty: 30 TABLET | Refills: 3 | Status: SHIPPED | OUTPATIENT
Start: 2020-09-25 | End: 2020-11-12

## 2020-09-25 RX ORDER — PSYLLIUM SEED (WITH DEXTROSE)
1 POWDER (GRAM) ORAL 2 TIMES DAILY
COMMUNITY

## 2020-09-25 NOTE — PROGRESS NOTES
2020    TELEHEALTH EVALUATION -- Audio/Visual (During ETLSQ-71 public health emergency)    Due to COVID 19 outbreak, patient's office visit was converted to a virtual visit. Patient was contacted and agreed to proceed with a virtual visit via Telephone Visit 25 minutes. The risks and benefits of converting to a virtual visit were discussed in light of the current infectious disease epidemic. Patient also understood that insurance coverage and co-pays are up to their individual insurance plans. HPI:  Benny Mcneill (:  1945) has requested an audio/video evaluation for the following concern(s):  Patient wanting to transfer all his GI care to [facility]. Previously a Patient of [facility] gastroenterology. Patient with history of Shaw's esophagus, surveillance EGD due . GERD symptoms well managed throughout the day, but returned in the evening. Acid reflux wakes him up at night. Initially lays flat in bed, then sleeps in the recliner. No dysphagia, weight loss or loss of appetite. Takes Plavix for history of coronary artery stents. Sees Dr. Rowena Bacon at [facility]. She denies chest pain, shortness of breath, or palpitations. He denies any syncopal episodes. Patient reports having 2-4 loose stools in the morning. Never feels fully evacuated. Experiences occasional mid abdominal cramping. At times cramping will cause him to experience nausea. Occasional emesis, reports this improves abdominal pressure/pain. No family history of colon cancer. No melena, hematochezia, or hematemesis. Patient reports a diet low in fiber. Drinks roughly four 8 to 10 ounce glasses of water per day. Diet consists mainly of fried fatty food. Drinks 6 to 12 cans of beer 3-4 times per week. Former smoker, quit in . Review of Systems   All other systems reviewed and are negative. Endoscopic investigations:   Colonoscopy 2016- sigmoid diverticulosis.  One 5 mm polyp found in the anus, treated with hot snare. EGD 9/2018-normal proximal and mid esophagus. Esophageal mucosal changes suggestive of short segment Shaw's esophagus, biopsy positive for Shaw's. Hiatal hernia. Normal stomach and normal examined duodenum. EGD/Colonosocpy due 2021    Prior to Visit Medications    Medication Sig Taking? Authorizing Provider   psyllium (METAMUCIL) 28 % packet Take 1 packet by mouth 2 times daily Take with full glass of h20 or juice Yes Historical Provider, MD   famotidine (PEPCID) 40 MG tablet Take 1 tablet by mouth nightly as needed (GERD) Yes DULCE Galeana CNP   lidocaine (LIDODERM) 5 % PLACE 1 PATCH ON THE SKIN DAILY, 12 HOURS ON AND 12 HOURS OFF Yes Catherine Jacques MD   pantoprazole (PROTONIX) 40 MG tablet Take 1 tablet by mouth daily Yes Catherine Jacques MD   lisinopril (PRINIVIL;ZESTRIL) 10 MG tablet Take 1 tablet by mouth daily Yes Catherine Jacques MD   isosorbide mononitrate (IMDUR) 60 MG extended release tablet Take 2 tablets by mouth daily Yes Janny Jackson MD   Magnesium 400 MG CAPS Take 1 capsule by mouth daily  Patient taking differently: Take 1 capsule by mouth nightly  Yes Catherine Jacques MD   atenolol (TENORMIN) 50 MG tablet Take 50 mg by mouth nightly  Yes Historical Provider, MD   clopidogrel (PLAVIX) 75 MG tablet Take 75 mg by mouth daily  Yes Historical Provider, MD   rosuvastatin (CRESTOR) 20 MG tablet Take 20 mg by mouth nightly  Yes Historical Provider, MD   nitroGLYCERIN (NITROSTAT) 0.4 MG SL tablet Place 0.4 mg under the tongue every 5 minutes as needed for Chest pain up to max of 3 total doses. If no relief after 1 dose, call 911.  Yes Historical Provider, MD   aspirin 81 MG tablet Take 81 mg by mouth daily Yes Historical Provider, MD   Cholecalciferol (VITAMIN D3) 1000 units TABS Take by mouth daily  Yes Historical Provider, MD   Cyanocobalamin (VITAMIN B-12) 3000 MCG SUBL Take by mouth daily  Yes Historical Provider, MD   FIBER COMPLETE PO Take 1 capsule by mouth nightly   Historical Provider, MD       Social History     Tobacco Use    Smoking status: Former Smoker     Packs/day: 1.00     Years: 51.00     Pack years: 51.00     Types: Cigarettes     Start date: 58     Last attempt to quit: 3/9/2011     Years since quittin.5    Smokeless tobacco: Never Used    Tobacco comment: started age 17    Substance Use Topics    Alcohol use: Yes    Drug use: Not on file      Allergies   Allergen Reactions    Iodides Nausea And Vomiting     Contrast Dye    Oxycodone-Acetaminophen Nausea And Vomiting   ,   Past Medical History:   Diagnosis Date    Cancer ([address]     prostate    Hyperlipidemia     Hypertension    ,   Past Surgical History:   Procedure Laterality Date    CARDIAC CATHETERIZATION  2019    CATARACT REMOVAL WITH IMPLANT Bilateral 2013    COLONOSCOPY  2016    CORONARY ANGIOPLASTY WITH STENT PLACEMENT  1998    CORONARY ANGIOPLASTY WITH STENT PLACEMENT  2007    CORONARY ANGIOPLASTY WITH STENT PLACEMENT  2009    CORONARY ANGIOPLASTY WITH STENT PLACEMENT  2012    HEMORRHOID SURGERY  2013    INGUINAL HERNIA REPAIR  1990    PROSTATE SURGERY  2003    removed    PROSTATE SURGERY  2013    biopsy    UPPER GASTROINTESTINAL ENDOSCOPY  2018    VASECTOMY     ,   Social History     Tobacco Use    Smoking status: Former Smoker     Packs/day: 1.00     Years: 51.00     Pack years: 51.00     Types: Cigarettes     Start date: 1960     Last attempt to quit: 3/9/2011     Years since quittin.5    Smokeless tobacco: Never Used    Tobacco comment: started age 17    Substance Use Topics    Alcohol use: Yes    Drug use: Not on file       Due to this being a TeleHealth encounter, evaluation of the following organ systems is limited: Vitals/Constitutional/EENT/Resp/CV/GI//MS/Neuro/Skin/Heme-Lymph-Imm. ASSESSMENT/PLAN:  1.  Shaw's esophagus determined by biopsy  Surveillance EGD due 2021.  2. Gastroesophageal reflux disease without esophagitis  Longstanding history of GERD. Poor diet with alcohol use most days of the week. Recommend diet changes, such as lean protein, complex carbohydrates. Avoid processed/fast food.  -Antireflux lifestyle discussed at length, examples provided. Daily PPI 15 to 30 minutes before breakfast.  Will add Pepcid at at bedtime.  -Elevate head of bed  3. Generalized abdominal pain  -Likely secondary to constipation and diet. 4. Constipation, unspecified constipation type  - Lifestyle modification including importance of adequate fluid intake through the day, regular exercise, good toilet hygiene discussed in detail  - Advised patient to increase fiber supplementation, aim for 15 -25 gms of fiber a day, OTC fiber supplementation may be considered if unable to meet requirements with diet, keep stools soft and regular, avoid straining. Patient advised to watch out for excessive bloating.  - Miralax 17 gm/d to above regimen and titrate to facilitate 1-2 soft bowel movements daily  5. Alcohol use  - Alcohol cessation recommended. -Reviewed liver function test from June, 2020 LFTs within normal limits. 6. Former smoker  -Continued  smoking cessation recommended  7. Nausea and vomiting, intractability of vomiting not specified, unspecified vomiting type  -Likely multifactorial, due to diet, constipation, and GERD. -Diet and lifestyle changes recommended, examples provided. Return in about 4 weeks (around 10/23/2020). An  electronic signature was used to authenticate this note.     --DULCE Rocha Caro - CNP on 9/25/2020 at 2:28 PM      Pursuant to the emergency declaration under the [facility] waiver authority and the [employer] Act, this Virtual  Visit was conducted, with patient's consent, to reduce the patient's risk of exposure to COVID-19 and provide continuity of care for an established patient. Services were provided through a video synchronous discussion virtually to substitute for in-person clinic visit.

## 2020-11-12 ENCOUNTER — OFFICE VISIT (OUTPATIENT)
Dept: GASTROENTEROLOGY | Age: 75
End: 2020-11-12
Payer: MEDICARE

## 2020-11-12 VITALS
WEIGHT: 199 LBS | HEIGHT: 64 IN | OXYGEN SATURATION: 99 % | SYSTOLIC BLOOD PRESSURE: 128 MMHG | DIASTOLIC BLOOD PRESSURE: 64 MMHG | BODY MASS INDEX: 33.97 KG/M2 | HEART RATE: 79 BPM

## 2020-11-12 PROCEDURE — 1123F ACP DISCUSS/DSCN MKR DOCD: CPT | Performed by: NURSE PRACTITIONER

## 2020-11-12 PROCEDURE — G8427 DOCREV CUR MEDS BY ELIG CLIN: HCPCS | Performed by: NURSE PRACTITIONER

## 2020-11-12 PROCEDURE — 4040F PNEUMOC VAC/ADMIN/RCVD: CPT | Performed by: NURSE PRACTITIONER

## 2020-11-12 PROCEDURE — 3017F COLORECTAL CA SCREEN DOC REV: CPT | Performed by: NURSE PRACTITIONER

## 2020-11-12 PROCEDURE — 1036F TOBACCO NON-USER: CPT | Performed by: NURSE PRACTITIONER

## 2020-11-12 PROCEDURE — G8417 CALC BMI ABV UP PARAM F/U: HCPCS | Performed by: NURSE PRACTITIONER

## 2020-11-12 PROCEDURE — G8484 FLU IMMUNIZE NO ADMIN: HCPCS | Performed by: NURSE PRACTITIONER

## 2020-11-12 PROCEDURE — 99213 OFFICE O/P EST LOW 20 MIN: CPT | Performed by: NURSE PRACTITIONER

## 2020-11-12 NOTE — PROGRESS NOTES
Gastroenterology Clinic Follow up Visit    Lindsey Ponce  [id number]  Chief Complaint   Patient presents with    Follow-up     Patient has been having 3-5 BM's in the morning, not feeling fully evacuated. Stools are soft, was doing Miralax daily, now 2x /week. No blood in his stool. HPI: Last seen in GI clinic on 9/25/20 with Shaw's esophagus determined by biopsy, surveillance EGD due 2021. GERD. Generalized abdominal pain with constipation. Patient placed on daily bowel regimen. Alcohol cessation strongly recommended. Nausea likely multifactorial due to diet, constipation and GERD. Lifestyle changes recommended. Interval change:   Patient reports since he started taking MiraLAX and Metamucil this has helped with abdominal pain, pressure and nausea. Was taking MiraLAX daily, now only twice weekly. Having 3-5 soft stools per day. No melena or hematochezia. Abdominal pain and nausea have completely resolved. No weight loss or loss of appetite. Metamucil daily, does drink water throughout the day. Has limited alcohol intake. No GERD or dysphagia. No at bedtime GERD symptoms with diet and antireflux lifestyle changes. No chest pain, shortness of breath, or palpitations. Previous GI work up/Endoscopic investigations:   Colonoscopy 4/2016- sigmoid diverticulosis. One 5 mm polyp found in the anus, treated with hot snare. EGD 9/2018-normal proximal and mid esophagus. Esophageal mucosal changes suggestive of short segment Shaw's esophagus, biopsy positive for Shaw's. Hiatal hernia. Normal stomach and normal examined duodenum. EGD/Colonosocpy due 2021    Review of Systems   All other systems reviewed and are negative. Past medical history, past surgical history, medication list, social and familyhistory reviewed    Blood pressure 128/64, pulse 79, height 5' 4\" (1.626 m), weight 199 lb (90.3 kg), SpO2 99 %. Physical Exam  Vitals signs reviewed.    Constitutional: General: He is not in acute distress. Appearance: Normal appearance. He is well-developed. He is not diaphoretic. HENT:      Head: Normocephalic and atraumatic. Eyes:      General: No scleral icterus. Conjunctiva/sclera: Conjunctivae normal.      Pupils: Pupils are equal, round, and reactive to light. Neck:      Musculoskeletal: Normal range of motion and neck supple. Cardiovascular:      Rate and Rhythm: Normal rate and regular rhythm. Pulmonary:      Effort: Pulmonary effort is normal. No respiratory distress. Breath sounds: Normal breath sounds. No wheezing or rales. Chest:      Chest wall: No tenderness. Abdominal:      General: Abdomen is protuberant. Bowel sounds are normal. There is no distension. Palpations: Abdomen is soft. There is no mass. Tenderness: There is no abdominal tenderness. There is no guarding or rebound. Genitourinary:     Comments: deferred  Musculoskeletal: Normal range of motion. Right lower leg: No edema. Left lower leg: No edema. Lymphadenopathy:      Cervical: No cervical adenopathy. Skin:     Coloration: Skin is not jaundiced or pale. Findings: No erythema or rash. Neurological:      Mental Status: He is alert and oriented to person, place, and time. Psychiatric:         Behavior: Behavior normal.         Thought Content: Thought content normal.         Judgment: Judgment normal.         Laboratory, Pathology, Radiology reviewed in detail with relevantimportant investigations summarized below:    No results for input(s): WBC, HGB, HCT, MCV, PLT in the last 720 hours. Lab Results   Component Value Date    ALT 52 (H) 01/21/2020    AST 39 01/21/2020    ALKPHOS 75 01/21/2020    BILITOT 0.9 (H) 01/21/2020     Assessment and Plan:  Carol Wilson 70 y.o. male for follow up. 1. Chronic idiopathic constipation  24-year-old male patient with clinical history suggestive of CIC versus IBS-C.   Symptoms have completely resolved with

## 2020-12-10 DIAGNOSIS — C61 PROSTATE CANCER (HCC): ICD-10-CM

## 2020-12-10 LAB — PROSTATE SPECIFIC ANTIGEN: 0.11 NG/ML (ref 0–6.22)

## 2021-02-17 ENCOUNTER — TELEPHONE (OUTPATIENT)
Dept: FAMILY MEDICINE CLINIC | Age: 76
End: 2021-02-17

## 2021-02-17 NOTE — TELEPHONE ENCOUNTER
Please ask patient what he is specifically concerned about in order for me to be able to write the referral.

## 2021-02-24 ENCOUNTER — OFFICE VISIT (OUTPATIENT)
Dept: FAMILY MEDICINE CLINIC | Age: 76
End: 2021-02-24
Payer: MEDICARE

## 2021-02-24 VITALS
HEIGHT: 64 IN | SYSTOLIC BLOOD PRESSURE: 124 MMHG | DIASTOLIC BLOOD PRESSURE: 74 MMHG | WEIGHT: 190 LBS | OXYGEN SATURATION: 98 % | HEART RATE: 65 BPM | BODY MASS INDEX: 32.44 KG/M2 | TEMPERATURE: 97.2 F

## 2021-02-24 DIAGNOSIS — H61.23 BILATERAL IMPACTED CERUMEN: Primary | ICD-10-CM

## 2021-02-24 PROCEDURE — 69210 REMOVE IMPACTED EAR WAX UNI: CPT | Performed by: PHYSICIAN ASSISTANT

## 2021-02-24 ASSESSMENT — ENCOUNTER SYMPTOMS
VOMITING: 0
BACK PAIN: 0
SHORTNESS OF BREATH: 0
CHEST TIGHTNESS: 0
ABDOMINAL PAIN: 0
ALLERGIC/IMMUNOLOGIC NEGATIVE: 1
NAUSEA: 0

## 2021-02-24 ASSESSMENT — PATIENT HEALTH QUESTIONNAIRE - PHQ9
1. LITTLE INTEREST OR PLEASURE IN DOING THINGS: 0
SUM OF ALL RESPONSES TO PHQ QUESTIONS 1-9: 0
2. FEELING DOWN, DEPRESSED OR HOPELESS: 0
SUM OF ALL RESPONSES TO PHQ QUESTIONS 1-9: 0

## 2021-02-24 NOTE — PATIENT INSTRUCTIONS
Patient Education        Earwax Blockage: Care Instructions  Your Care Instructions     Earwax is a natural substance that protects the ear canal. Normally, earwax drains from the ears and does not cause problems. Sometimes earwax builds up and hardens. Earwax blockage (also called cerumen impaction) can cause some loss of hearing and pain. When wax is tightly packed, you will need to have your doctor remove it. Follow-up care is a key part of your treatment and safety. Be sure to make and go to all appointments, and call your doctor if you are having problems. It's also a good idea to know your test results and keep a list of the medicines you take. How can you care for yourself at home? · Do not try to remove earwax with cotton swabs, fingers, or other objects. This can make the blockage worse and damage the eardrum. · If your doctor recommends that you try to remove earwax at home:  ? Soften and loosen the earwax with warm mineral oil. You also can try hydrogen peroxide mixed with an equal amount of room temperature water. Place 2 drops of the fluid, warmed to body temperature, in the ear two times a day for up to 5 days. ? Once the wax is loose and soft, all that is usually needed to remove it from the ear canal is a gentle, warm shower. Direct the water into the ear, then tip your head to let the earwax drain out. Dry your ear thoroughly with a hair dryer set on low. Hold the dryer several inches from your ear. ? If the warm mineral oil and shower do not work, use an over-the-counter wax softener. Read and follow all instructions on the label. After using the wax softener, use an ear syringe to gently flush the ear. Make sure the flushing solution is body temperature. Cool or hot fluids in the ear can cause dizziness. When should you call for help?    Call your doctor now or seek immediate medical care if:    · Pus or blood drains from your ear.     · Your ears are ringing or feel full.     · You have a loss of hearing. Watch closely for changes in your health, and be sure to contact your doctor if:    · You have pain or reduced hearing after 1 week of home treatment.     · You have any new symptoms, such as nausea or balance problems. Where can you learn more? Go to [email or web address] and sign in to your [employer] account. Enter K373 in the [employer] box to learn more about \"Earwax Blockage: Care Instructions. \"     If you do not have an account, please click on the \"Sign Up Now\" link. Current as of: June 26, 2019               Content Version: 12.6  © 8895-3211 [employer]. Care instructions adapted under license by [facility] ([facility]). If you have questions about a medical condition or this instruction, always ask your healthcare professional. [address] any warranty or liability for your use of this information.

## 2021-02-24 NOTE — PROGRESS NOTES
[facility] Encounter  CHIEF COMPLAINT       Chief Complaint   Patient presents with    Cerumen Impaction       HISTORY OF PRESENT ILLNESS   Wanda Gipson is a 78 y.o. male who presents with:  HPI   The patient is presenting today with a CC of right ear cerumen impaction. Patient is presenting today with hearing aids. Patient went to a hearing center to be screened and was told right ear is impacted. Patient would like removal of the cerumen. Patient denies any ear pain, itching, or sinus pressure. REVIEW OF SYSTEMS     Review of Systems   Constitutional: Negative for activity change, chills and fever. HENT: Positive for hearing loss. Negative for ear discharge and ear pain. Eyes: Negative for visual disturbance. Respiratory: Negative for chest tightness and shortness of breath. Cardiovascular: Negative for chest pain. Gastrointestinal: Negative for abdominal pain, nausea and vomiting. Endocrine: Negative. Genitourinary: Negative for dysuria. Musculoskeletal: Negative for back pain and neck pain. Allergic/Immunologic: Negative. Neurological: Negative for headaches. Hematological: Negative. Psychiatric/Behavioral: Negative. PAST MEDICAL HISTORY         Diagnosis Date    Cancer [facility])     prostate    Hyperlipidemia     Hypertension      SURGICAL HISTORY     Patient  has a past surgical history that includes Vasectomy; Inguinal hernia repair (05/1990); Coronary angioplasty with stent (06/1998); Coronary angioplasty with stent (02/2007); Coronary angioplasty with stent (01/2009); Coronary angioplasty with stent (07/2012); Cataract removal with implant (Bilateral, 06/2013); Prostate surgery (06/2003); Prostate surgery (09/2013); Hemorrhoid surgery (11/2013); Colonoscopy (04/2016); Upper gastrointestinal endoscopy (09/2018); and Cardiac catheterization (12/2019).   CURRENT MEDICATIONS       Previous Medications    ASPIRIN 81 MG TABLET Take 81 mg by mouth daily    ATENOLOL (TENORMIN) 50 MG TABLET    Take 50 mg by mouth nightly     CHOLECALCIFEROL (VITAMIN D3) 1000 UNITS TABS    Take by mouth daily     CLOPIDOGREL (PLAVIX) 75 MG TABLET    Take 75 mg by mouth daily     CYANOCOBALAMIN (VITAMIN B-12) 3000 MCG SUBL    Take by mouth daily     ISOSORBIDE MONONITRATE (IMDUR) 60 MG EXTENDED RELEASE TABLET    Take 2 tablets by mouth daily    LIDOCAINE (LIDODERM) 5 %    PLACE 1 PATCH ON THE SKIN DAILY, 12 HOURS ON AND 12 HOURS OFF    LISINOPRIL (PRINIVIL;ZESTRIL) 10 MG TABLET    Take 1 tablet by mouth daily    MAGNESIUM 400 MG CAPS    Take 1 capsule by mouth daily    NITROGLYCERIN (NITROSTAT) 0.4 MG SL TABLET    Place 0.4 mg under the tongue every 5 minutes as needed for Chest pain up to max of 3 total doses. If no relief after 1 dose, call 911. PANTOPRAZOLE (PROTONIX) 40 MG TABLET    Take 1 tablet by mouth daily    PSYLLIUM (METAMUCIL) 28 % PACKET    Take 1 packet by mouth 2 times daily Take with full glass of h20 or juice    ROSUVASTATIN (CRESTOR) 20 MG TABLET    Take 20 mg by mouth nightly      ALLERGIES     Patient is is allergic to iodides and oxycodone-acetaminophen. FAMILY HISTORY     Patient'sfamily history includes Cancer in his brother and father; Heart Disease in his brother, father, and paternal uncle. SOCIAL HISTORY     Patient  reports that he quit smoking about 9 years ago. His smoking use included cigarettes. He started smoking about 61 years ago. He has a 51.00 pack-year smoking history. He has never used smokeless tobacco. He reports current alcohol use. PHYSICAL EXAM     VITALS  BP: 124/74, Temp: 97.2 °F (36.2 °C), Pulse: 65,  , SpO2: 98 %  Physical Exam  Vitals signs and nursing note reviewed. Constitutional:       General: He is not in acute distress. Appearance: Normal appearance. He is well-developed. He is not ill-appearing. HENT:      Right Ear: Decreased hearing noted. There is impacted cerumen.  Tympanic membrane is not erythematous, retracted or bulging. Tympanic membrane has normal mobility. Left Ear: There is impacted cerumen (partial impaction). Tympanic membrane is not erythematous, retracted or bulging. Tympanic membrane has normal mobility. Ears:      Comments: Hearing aids present during examination. Cardiovascular:      Rate and Rhythm: Normal rate and regular rhythm. No extrasystoles are present. Chest Wall: PMI is not displaced. No thrill. Pulses: Normal pulses. No decreased pulses. Heart sounds: Normal heart sounds. Pulmonary:      Effort: Pulmonary effort is normal.      Breath sounds: Normal breath sounds and air entry. No stridor. No decreased breath sounds, wheezing, rhonchi or rales. Neurological:      Mental Status: He is alert. Psychiatric:         Behavior: Behavior is cooperative. READY CARE COURSE   Labs:  No results found for this visit on 02/24/21. IMAGING:  No orders to display     Scheduled Meds:  Continuous Infusions:  PRN Meds:. PROCEDURE:  The patient had their both ear(s) irrigated to remove impacted cerumen. 10 drops of debrox ceruminolytic solution was instilled in each ear canal.  After 10 minutes, a 50:50 mixture of warm water and hydrogen peroxide was used to flush each ear canal.  A curette was used to remove softened wax. The ear canals and TMs appear normal. The patient tolerated the procedure well. FINAL IMPRESSION      1. Bilateral impacted cerumen      DISPOSITION/PLAN   1. Patient's in office cerumen removal was successful. Patient's ear canal is clean and TM visualized. On this date 02/24/21 I have spent 15 minutes reviewing previous notes, test results and face to face with the patient discussing the diagnosis and importance of compliance with the treatment plan as well as documenting on the day of the visit. PATIENT REFERRED TO:  Return if symptoms worsen or fail to improve.     DISCHARGE MEDICATIONS:  New Prescriptions    No medications on file     Cannot display discharge medications since this is not an admission.        Jennifer Zuniga

## 2021-04-06 ENCOUNTER — OFFICE VISIT (OUTPATIENT)
Dept: GASTROENTEROLOGY | Age: 76
End: 2021-04-06
Payer: MEDICARE

## 2021-04-06 ENCOUNTER — TELEPHONE (OUTPATIENT)
Dept: GASTROENTEROLOGY | Age: 76
End: 2021-04-06

## 2021-04-06 VITALS
RESPIRATION RATE: 16 BRPM | HEART RATE: 63 BPM | BODY MASS INDEX: 33.57 KG/M2 | HEIGHT: 64 IN | DIASTOLIC BLOOD PRESSURE: 74 MMHG | WEIGHT: 196.6 LBS | OXYGEN SATURATION: 97 % | SYSTOLIC BLOOD PRESSURE: 118 MMHG

## 2021-04-06 DIAGNOSIS — Z86.010 HISTORY OF COLON POLYPS: ICD-10-CM

## 2021-04-06 DIAGNOSIS — K21.9 GASTROESOPHAGEAL REFLUX DISEASE, UNSPECIFIED WHETHER ESOPHAGITIS PRESENT: ICD-10-CM

## 2021-04-06 DIAGNOSIS — Z01.818 PRE-OP TESTING: ICD-10-CM

## 2021-04-06 DIAGNOSIS — R19.5 LOOSE STOOLS: ICD-10-CM

## 2021-04-06 DIAGNOSIS — Z87.19 HISTORY OF BARRETT'S ESOPHAGUS: Primary | ICD-10-CM

## 2021-04-06 PROCEDURE — G8417 CALC BMI ABV UP PARAM F/U: HCPCS | Performed by: INTERNAL MEDICINE

## 2021-04-06 PROCEDURE — 3017F COLORECTAL CA SCREEN DOC REV: CPT | Performed by: INTERNAL MEDICINE

## 2021-04-06 PROCEDURE — 99203 OFFICE O/P NEW LOW 30 MIN: CPT | Performed by: INTERNAL MEDICINE

## 2021-04-06 PROCEDURE — G8427 DOCREV CUR MEDS BY ELIG CLIN: HCPCS | Performed by: INTERNAL MEDICINE

## 2021-04-06 PROCEDURE — 1123F ACP DISCUSS/DSCN MKR DOCD: CPT | Performed by: INTERNAL MEDICINE

## 2021-04-06 PROCEDURE — 1036F TOBACCO NON-USER: CPT | Performed by: INTERNAL MEDICINE

## 2021-04-06 PROCEDURE — 4040F PNEUMOC VAC/ADMIN/RCVD: CPT | Performed by: INTERNAL MEDICINE

## 2021-04-06 RX ORDER — SODIUM, POTASSIUM,MAG SULFATES 17.5-3.13G
SOLUTION, RECONSTITUTED, ORAL ORAL
Qty: 1 BOTTLE | Refills: 0 | Status: SHIPPED | OUTPATIENT
Start: 2021-04-06 | End: 2021-06-14

## 2021-04-06 RX ORDER — SODIUM, POTASSIUM,MAG SULFATES 17.5-3.13G
SOLUTION, RECONSTITUTED, ORAL ORAL
Qty: 1 BOTTLE | Refills: 0 | Status: SHIPPED | OUTPATIENT
Start: 2021-04-06 | End: 2021-04-06

## 2021-04-06 NOTE — TELEPHONE ENCOUNTER
Faxed request to [facility] - DR. HERBERT for pt hold plavix prior to procedure.

## 2021-04-06 NOTE — PROGRESS NOTES
Gastroenterology Clinic Visit    Nicholas Ahumada  [id number]  Chief Complaint   Patient presents with    Consultation     HPI: 78 y.o. male presents to the clinic to establish care with new gastroenterologist.  Patient with history of Shaw's esophagus in the past and history of colon polyps. Patient reports previous colonoscopy in April 2016 and last upper endoscopy in 2018. Longstanding reflux, on Protonix with good control of symptoms, very intermittent nonconcerning dysphagia, denies any hematemesis, weight loss or loss of appetite  Reports 3-4 loose bowel movements a day denies any blood in them. Takes 1 teaspoon of Metamucil daily. Reports history of hemorrhoid surgery in 2013, required anal dilation following surgery. History of cardiac stents, reports last stent placement 2012, underwent angioplasty in 2018  No family history of colon cancer    Previous GI work up/Endoscopic investigations:   EGD: September 2018: At [facility] gastroenterology: Very minimal Shaw's esophagus, biopsies negative for dysplasia  Colonoscopy: April 2016: At [facility] gastroenterology: Small sigmoid polyp, no pathology available given polyp was fulgurated using the tip of the snare. Review of Systems   All other systems reviewed and are negative.     Past Medical History:   Diagnosis Date    Cancer [facility])     prostate    Hyperlipidemia     Hypertension      Past Surgical History:   Procedure Laterality Date    CARDIAC CATHETERIZATION  12/2019    CATARACT REMOVAL WITH IMPLANT Bilateral 06/2013    COLONOSCOPY  04/2016    CORONARY ANGIOPLASTY WITH STENT PLACEMENT  06/1998    CORONARY ANGIOPLASTY WITH STENT PLACEMENT  02/2007    CORONARY ANGIOPLASTY WITH STENT PLACEMENT  01/2009    CORONARY ANGIOPLASTY WITH STENT PLACEMENT  07/2012    HEMORRHOID SURGERY  11/2013    INGUINAL HERNIA REPAIR  05/1990    PROSTATE SURGERY  06/2003    removed    PROSTATE SURGERY  09/2013    biopsy    UPPER GASTROINTESTINAL ENDOSCOPY 09/2018    VASECTOMY       Current Outpatient Medications on File Prior to Visit   Medication Sig Dispense Refill    pantoprazole (PROTONIX) 40 MG tablet TAKE 1 TABLET DAILY 90 tablet 1    psyllium (METAMUCIL) 28 % packet Take 1 packet by mouth 2 times daily Take with full glass of h20 or juice      lidocaine (LIDODERM) 5 % PLACE 1 PATCH ON THE SKIN DAILY, 12 HOURS ON AND 12 HOURS OFF 30 patch 3    lisinopril (PRINIVIL;ZESTRIL) 10 MG tablet Take 1 tablet by mouth daily 90 tablet 4    isosorbide mononitrate (IMDUR) 60 MG extended release tablet Take 2 tablets by mouth daily 30 tablet 3    Magnesium 400 MG CAPS Take 1 capsule by mouth daily (Patient taking differently: Take 1 capsule by mouth nightly ) 90 capsule 4    atenolol (TENORMIN) 50 MG tablet Take 50 mg by mouth nightly       clopidogrel (PLAVIX) 75 MG tablet Take 75 mg by mouth daily       rosuvastatin (CRESTOR) 20 MG tablet Take 20 mg by mouth nightly       nitroGLYCERIN (NITROSTAT) 0.4 MG SL tablet Place 0.4 mg under the tongue every 5 minutes as needed for Chest pain up to max of 3 total doses. If no relief after 1 dose, call 911.  aspirin 81 MG tablet Take 81 mg by mouth daily      Cholecalciferol (VITAMIN D3) 1000 units TABS Take by mouth daily       Cyanocobalamin (VITAMIN B-12) 3000 MCG SUBL Take by mouth daily        No current facility-administered medications on file prior to visit.       Family History   Problem Relation Age of Onset    Cancer Father         prostate    Heart Disease Father     Cancer Brother         prostate    Heart Disease Brother     Heart Disease Paternal Uncle     Colon Cancer Neg Hx      Social History     Socioeconomic History    Marital status:      Spouse name: None    Number of children: None    Years of education: None    Highest education level: None   Occupational History    None   Social Needs    Financial resource strain: None    Food insecurity     Worry: None     Inability: None    Transportation needs     Medical: None     Non-medical: None   Tobacco Use    Smoking status: Former Smoker     Packs/day: 1.00     Years: 51.00     Pack years: 51.00     Types: Cigarettes     Start date: 58     Quit date: 3/9/2011     Years since quitting: 10.0    Smokeless tobacco: Never Used    Tobacco comment: started age 17    Substance and Sexual Activity    Alcohol use: Yes    Drug use: None    Sexual activity: Never   Lifestyle    Physical activity     Days per week: None     Minutes per session: None    Stress: None   Relationships    Social connections     Talks on phone: None     Gets together: None     Attends Yazidism service: None     Active member of club or organization: None     Attends meetings of clubs or organizations: None     Relationship status: None    Intimate partner violence     Fear of current or ex partner: None     Emotionally abused: None     Physically abused: None     Forced sexual activity: None   Other Topics Concern    None   Social History Narrative    None     Blood pressure 118/74, pulse 63, resp. rate 16, height 5' 4\" (1.626 m), weight 196 lb 9.6 oz (89.2 kg), SpO2 97 %. Physical Exam  Constitutional:       General: He is not in acute distress. Appearance: Normal appearance. He is well-developed. Comments: Central obesity   Eyes:      General: No scleral icterus. Cardiovascular:      Rate and Rhythm: Normal rate and regular rhythm. Pulmonary:      Effort: Pulmonary effort is normal.      Breath sounds: Normal breath sounds. Abdominal:      General: Bowel sounds are normal. There is no distension. Palpations: Abdomen is soft. There is no mass. Tenderness: There is no abdominal tenderness. There is no guarding or rebound. Musculoskeletal: Normal range of motion. Lymphadenopathy:      Cervical: No cervical adenopathy. Neurological:      Mental Status: He is alert and oriented to person, place, and time.    Psychiatric: worsen or fail to improve. Peter Arzola MD   Staff Gastroenterologist  [facility]    Please note this report has been partially produced using speech recognition software and may cause contain errors related to thatsystem including grammar, punctuation and spelling as well as words and phrases that may seem inappropriate. If there are questions or concerns please feel free to contact me to clarify.

## 2021-04-09 ENCOUNTER — NURSE ONLY (OUTPATIENT)
Dept: PRIMARY CARE CLINIC | Age: 76
End: 2021-04-09

## 2021-04-09 DIAGNOSIS — Z01.818 PRE-OP TESTING: ICD-10-CM

## 2021-04-10 LAB
SARS-COV-2: NOT DETECTED
SOURCE: NORMAL

## 2021-04-15 ENCOUNTER — ANCILLARY PROCEDURE (OUTPATIENT)
Dept: ENDOSCOPY | Age: 76
End: 2021-04-15
Attending: INTERNAL MEDICINE
Payer: MEDICARE

## 2021-04-15 ENCOUNTER — ANESTHESIA (OUTPATIENT)
Dept: ENDOSCOPY | Age: 76
End: 2021-04-15
Payer: MEDICARE

## 2021-04-15 ENCOUNTER — ANESTHESIA EVENT (OUTPATIENT)
Dept: ENDOSCOPY | Age: 76
End: 2021-04-15
Payer: MEDICARE

## 2021-04-15 ENCOUNTER — HOSPITAL ENCOUNTER (OUTPATIENT)
Age: 76
Setting detail: OUTPATIENT SURGERY
Discharge: HOME | End: 2021-04-15
Attending: INTERNAL MEDICINE | Admitting: INTERNAL MEDICINE
Payer: MEDICARE

## 2021-04-15 VITALS
BODY MASS INDEX: 32.1 KG/M2 | WEIGHT: 188 LBS | RESPIRATION RATE: 18 BRPM | TEMPERATURE: 97.3 F | DIASTOLIC BLOOD PRESSURE: 69 MMHG | SYSTOLIC BLOOD PRESSURE: 109 MMHG | HEIGHT: 64 IN | OXYGEN SATURATION: 99 % | HEART RATE: 67 BPM

## 2021-04-15 VITALS — DIASTOLIC BLOOD PRESSURE: 69 MMHG | OXYGEN SATURATION: 99 % | SYSTOLIC BLOOD PRESSURE: 111 MMHG

## 2021-04-15 PROCEDURE — 7100000011 HC PHASE II RECOVERY - ADDTL 15 MIN: Performed by: INTERNAL MEDICINE

## 2021-04-15 PROCEDURE — 3609027000 HC COLONOSCOPY: Performed by: INTERNAL MEDICINE

## 2021-04-15 PROCEDURE — 6370000000 HC RX 637 (ALT 250 FOR IP): Performed by: INTERNAL MEDICINE

## 2021-04-15 PROCEDURE — 88313 SPECIAL STAINS GROUP 2: CPT

## 2021-04-15 PROCEDURE — 2709999900 HC NON-CHARGEABLE SUPPLY: Performed by: INTERNAL MEDICINE

## 2021-04-15 PROCEDURE — 7100000010 HC PHASE II RECOVERY - FIRST 15 MIN: Performed by: INTERNAL MEDICINE

## 2021-04-15 PROCEDURE — 2580000003 HC RX 258

## 2021-04-15 PROCEDURE — 45385 COLONOSCOPY W/LESION REMOVAL: CPT | Performed by: INTERNAL MEDICINE

## 2021-04-15 PROCEDURE — 3700000001 HC ADD 15 MINUTES (ANESTHESIA): Performed by: INTERNAL MEDICINE

## 2021-04-15 PROCEDURE — 43239 EGD BIOPSY SINGLE/MULTIPLE: CPT | Performed by: INTERNAL MEDICINE

## 2021-04-15 PROCEDURE — 2500000003 HC RX 250 WO HCPCS: Performed by: NURSE ANESTHETIST, CERTIFIED REGISTERED

## 2021-04-15 PROCEDURE — 3700000000 HC ANESTHESIA ATTENDED CARE: Performed by: INTERNAL MEDICINE

## 2021-04-15 PROCEDURE — 88305 TISSUE EXAM BY PATHOLOGIST: CPT

## 2021-04-15 PROCEDURE — 45380 COLONOSCOPY AND BIOPSY: CPT | Performed by: INTERNAL MEDICINE

## 2021-04-15 PROCEDURE — 6360000002 HC RX W HCPCS: Performed by: NURSE ANESTHETIST, CERTIFIED REGISTERED

## 2021-04-15 PROCEDURE — 2580000003 HC RX 258: Performed by: INTERNAL MEDICINE

## 2021-04-15 PROCEDURE — 3609017100 HC EGD: Performed by: INTERNAL MEDICINE

## 2021-04-15 RX ORDER — LIDOCAINE HYDROCHLORIDE 20 MG/ML
INJECTION, SOLUTION INFILTRATION; PERINEURAL PRN
Status: DISCONTINUED | OUTPATIENT
Start: 2021-04-15 | End: 2021-04-15 | Stop reason: SDUPTHER

## 2021-04-15 RX ORDER — SIMETHICONE 20 MG/.3ML
EMULSION ORAL PRN
Status: DISCONTINUED | OUTPATIENT
Start: 2021-04-15 | End: 2021-04-15 | Stop reason: ALTCHOICE

## 2021-04-15 RX ORDER — SODIUM CHLORIDE 9 MG/ML
INJECTION, SOLUTION INTRAVENOUS
Status: COMPLETED
Start: 2021-04-15 | End: 2021-04-15

## 2021-04-15 RX ORDER — MAGNESIUM HYDROXIDE 1200 MG/15ML
LIQUID ORAL PRN
Status: DISCONTINUED | OUTPATIENT
Start: 2021-04-15 | End: 2021-04-15 | Stop reason: ALTCHOICE

## 2021-04-15 RX ORDER — SODIUM CHLORIDE 9 MG/ML
INJECTION, SOLUTION INTRAVENOUS CONTINUOUS
Status: DISCONTINUED | OUTPATIENT
Start: 2021-04-15 | End: 2021-04-15 | Stop reason: HOSPADM

## 2021-04-15 RX ORDER — GLYCOPYRROLATE 1 MG/5 ML
SYRINGE (ML) INTRAVENOUS PRN
Status: DISCONTINUED | OUTPATIENT
Start: 2021-04-15 | End: 2021-04-15 | Stop reason: SDUPTHER

## 2021-04-15 RX ORDER — PROPOFOL 10 MG/ML
INJECTION, EMULSION INTRAVENOUS PRN
Status: DISCONTINUED | OUTPATIENT
Start: 2021-04-15 | End: 2021-04-15 | Stop reason: SDUPTHER

## 2021-04-15 RX ADMIN — Medication 0.2 MG: at 10:11

## 2021-04-15 RX ADMIN — SODIUM CHLORIDE: 9 INJECTION, SOLUTION INTRAVENOUS at 09:29

## 2021-04-15 RX ADMIN — PROPOFOL 300 MG: 10 INJECTION, EMULSION INTRAVENOUS at 10:12

## 2021-04-15 RX ADMIN — LIDOCAINE HYDROCHLORIDE 60 MG: 20 INJECTION, SOLUTION INFILTRATION; PERINEURAL at 10:12

## 2021-04-15 ASSESSMENT — PULMONARY FUNCTION TESTS
PIF_VALUE: 1

## 2021-04-15 ASSESSMENT — PAIN - FUNCTIONAL ASSESSMENT: PAIN_FUNCTIONAL_ASSESSMENT: 0-10

## 2021-04-15 NOTE — ANESTHESIA POSTPROCEDURE EVALUATION
Department of Anesthesiology  Postprocedure Note    Patient: Jeff Yates  MRN: [id number]  YOB: 1945  Date of evaluation: 4/15/2021  Time:  10:44 AM     Procedure Summary     Date: 04/15/21 Room / Location: [address] [place] [address]    Anesthesia Start: 1008 Anesthesia Stop:     Procedures:       EGD DIAGNOSTIC ONLY (N/A )      COLORECTAL CANCER SCREENING, HIGH RISK (N/A ) Diagnosis: (Hx colon polyps, hx allen's esophagus, gerd)    Surgeons: Tom Edwards MD Responsible Provider: DULCE Sanders CRNA    Anesthesia Type: MAC ASA Status: 3          Anesthesia Type: No value filed. Orquidea Phase I: Orquidea Score: 10    Orquidea Phase II:      Last vitals: Reviewed and per EMR flowsheets.        Anesthesia Post Evaluation    Patient location during evaluation: bedside  Patient participation: complete - patient participated  Level of consciousness: awake and awake and alert  Pain score: 0  Airway patency: patent  Nausea & Vomiting: no nausea and no vomiting  Complications: no  Cardiovascular status: blood pressure returned to baseline and hemodynamically stable  Respiratory status: acceptable and spontaneous ventilation  Hydration status: euvolemic

## 2021-04-15 NOTE — ANESTHESIA PRE PROCEDURE
Department of Anesthesiology  Preprocedure Note       Name:  Nerissa Arnold   Age:  78 y.o.  :  1945                                          MRN:  [id number]         Date:  4/15/2021      Surgeon: Balaji Laguna):  Clark Longoria MD    Procedure: Procedure(s):  EGD DIAGNOSTIC ONLY  COLORECTAL CANCER SCREENING, HIGH RISK    Medications prior to admission:   Prior to Admission medications    Medication Sig Start Date End Date Taking? Authorizing Provider   Na Sulfate-K Sulfate-Mg Sulf 17.5-3.13-1.6 GM/177ML SOLN As directed 21   Clark Longoria MD   pantoprazole (PROTONIX) 40 MG tablet TAKE 1 TABLET DAILY 3/9/21   Kaye Whitley MD   psyllium (METAMUCIL) 28 % packet Take 1 packet by mouth 2 times daily Take with full glass of h20 or juice    Historical Provider, MD   lidocaine (LIDODERM) 5 % PLACE 1 PATCH ON THE SKIN DAILY, 12 HOURS ON AND 12 HOURS OFF 20   Kaye Whitley MD   lisinopril (PRINIVIL;ZESTRIL) 10 MG tablet Take 1 tablet by mouth daily 20   Kaye Whitley MD   isosorbide mononitrate (IMDUR) 60 MG extended release tablet Take 2 tablets by mouth daily 19   Tony Durand MD   Magnesium 400 MG CAPS Take 1 capsule by mouth daily  Patient taking differently: Take 1 capsule by mouth nightly  10/7/19   Kaye Whitley MD   atenolol (TENORMIN) 50 MG tablet Take 50 mg by mouth nightly  19   Historical Provider, MD   clopidogrel (PLAVIX) 75 MG tablet Take 75 mg by mouth daily  19   Historical Provider, MD   rosuvastatin (CRESTOR) 20 MG tablet Take 20 mg by mouth nightly  19   Historical Provider, MD   nitroGLYCERIN (NITROSTAT) 0.4 MG SL tablet Place 0.4 mg under the tongue every 5 minutes as needed for Chest pain up to max of 3 total doses. If no relief after 1 dose, call 911.     Historical Provider, MD   aspirin 81 MG tablet Take 81 mg by mouth daily    Historical Provider, MD   Cholecalciferol (VITAMIN D3) 1000 units TABS Take by mouth daily     Historical Provider, MD   Cyanocobalamin (VITAMIN B-12) 3000 MCG SUBL Take by mouth daily     Historical Provider, MD       Current medications:    No current facility-administered medications for this encounter. Current Outpatient Medications   Medication Sig Dispense Refill    Na Sulfate-K Sulfate-Mg Sulf 17.5-3.13-1.6 GM/177ML SOLN As directed 1 Bottle 0    pantoprazole (PROTONIX) 40 MG tablet TAKE 1 TABLET DAILY 90 tablet 1    psyllium (METAMUCIL) 28 % packet Take 1 packet by mouth 2 times daily Take with full glass of h20 or juice      lidocaine (LIDODERM) 5 % PLACE 1 PATCH ON THE SKIN DAILY, 12 HOURS ON AND 12 HOURS OFF 30 patch 3    lisinopril (PRINIVIL;ZESTRIL) 10 MG tablet Take 1 tablet by mouth daily 90 tablet 4    isosorbide mononitrate (IMDUR) 60 MG extended release tablet Take 2 tablets by mouth daily 30 tablet 3    Magnesium 400 MG CAPS Take 1 capsule by mouth daily (Patient taking differently: Take 1 capsule by mouth nightly ) 90 capsule 4    atenolol (TENORMIN) 50 MG tablet Take 50 mg by mouth nightly       clopidogrel (PLAVIX) 75 MG tablet Take 75 mg by mouth daily       rosuvastatin (CRESTOR) 20 MG tablet Take 20 mg by mouth nightly       nitroGLYCERIN (NITROSTAT) 0.4 MG SL tablet Place 0.4 mg under the tongue every 5 minutes as needed for Chest pain up to max of 3 total doses. If no relief after 1 dose, call 911.  aspirin 81 MG tablet Take 81 mg by mouth daily      Cholecalciferol (VITAMIN D3) 1000 units TABS Take by mouth daily       Cyanocobalamin (VITAMIN B-12) 3000 MCG SUBL Take by mouth daily          Allergies:     Allergies   Allergen Reactions    Iodides Nausea And Vomiting     Contrast Dye    Oxycodone-Acetaminophen Nausea And Vomiting       Problem List:    Patient Active Problem List   Diagnosis Code    Chronic right hip pain M25.551, G89.29    Coronary artery disease involving native coronary artery of native heart without angina pectoris I25.10    H/O prostate cancer Z85.46    Essential hypertension I10    Shaw esophagus K22.70    Alcohol use disorder, mild, abuse F10.10    H/O nicotine dependence Z87.891    Nocturnal leg cramps G47.62    Chest pain R07.9    Unstable angina (HCC) I20.0    Abnormal glucose R73.09    Malignant tumor of prostate ([facility] [address] C61    Hyperlipidemia E78.5    Sensorineural hearing loss (SNHL) of both ears H90.3    Pain of right heel M79.671    Tenesmus R19.8    Change in stool R19.5    Generalized abdominal pain R10.84    Other insomnia G47.09    Memory change R41.3    Bilateral impacted cerumen H61.23       Past Medical History:        Diagnosis Date    Cancer ([facility] [address]     prostate    Hyperlipidemia     Hypertension        Past Surgical History:        Procedure Laterality Date    CARDIAC CATHETERIZATION  12/2019    CATARACT REMOVAL WITH IMPLANT Bilateral 06/2013    COLONOSCOPY  04/2016    CORONARY ANGIOPLASTY WITH STENT PLACEMENT  06/1998    CORONARY ANGIOPLASTY WITH STENT PLACEMENT  02/2007    CORONARY ANGIOPLASTY WITH STENT PLACEMENT  01/2009    CORONARY ANGIOPLASTY WITH STENT PLACEMENT  07/2012    HEMORRHOID SURGERY  11/2013    INGUINAL HERNIA REPAIR  05/1990    PROSTATE SURGERY  06/2003    removed    PROSTATE SURGERY  09/2013    biopsy    UPPER GASTROINTESTINAL ENDOSCOPY  09/2018    VASECTOMY         Social History:    Social History     Tobacco Use    Smoking status: Former Smoker     Packs/day: 1.00     Years: 51.00     Pack years: 51.00     Types: Cigarettes     Start date: 58     Quit date: 3/9/2011     Years since quitting: 10.1    Smokeless tobacco: Never Used    Tobacco comment: started age 17    Substance Use Topics    Alcohol use: Yes                                Counseling given: Not Answered  Comment: started age 17       Vital Signs (Current): There were no vitals filed for this visit.                                            BP Readings from Last 3 Encounters:   04/06/21 118/74 02/24/21 124/74   11/12/20 128/64       NPO Status:                                                                                 BMI:   Wt Readings from Last 3 Encounters:   04/06/21 196 lb 9.6 oz (89.2 kg)   02/24/21 190 lb (86.2 kg)   11/12/20 199 lb (90.3 kg)     There is no height or weight on file to calculate BMI.    CBC:   Lab Results   Component Value Date    WBC 3.4 12/02/2019    RBC 4.68 12/02/2019    HGB 14.8 12/02/2019    HCT 43.0 12/02/2019    MCV 92.0 12/02/2019    RDW 13.4 12/02/2019     12/02/2019       CMP:   Lab Results   Component Value Date     01/21/2020    K 4.4 01/21/2020    K 4.0 12/02/2019     01/21/2020    CO2 27 01/21/2020    BUN 20 01/21/2020    CREATININE 1.28 01/21/2020    GFRAA >60.0 01/21/2020    LABGLOM 55.1 01/21/2020    GLUCOSE 100 01/21/2020    GLUCOSE 109 06/25/2019    PROT 6.7 01/21/2020    CALCIUM 9.6 01/21/2020    BILITOT 0.9 01/21/2020    ALKPHOS 75 01/21/2020    AST 39 01/21/2020    ALT 52 01/21/2020       POC Tests: No results for input(s): POCGLU, POCNA, POCK, POCCL, POCBUN, POCHEMO, POCHCT in the last 72 hours.     Coags:   Lab Results   Component Value Date    PROTIME 10.9 07/25/2012    INR 1.1 07/25/2012       HCG (If Applicable): No results found for: PREGTESTUR, PREGSERUM, HCG, HCGQUANT     ABGs: No results found for: PHART, PO2ART, ASL7NGM, HFQ4ZGL, BEART, K0XLVQRZ     Type & Screen (If Applicable):  No results found for: LABABO, LABRH    Drug/Infectious Status (If Applicable):  No results found for: HIV, HEPCAB    COVID-19 Screening (If Applicable):   Lab Results   Component Value Date    COVID19 Not Detected 04/09/2021           Anesthesia Evaluation    Airway: Mallampati: II  TM distance: >3 FB   Neck ROM: full  Mouth opening: > = 3 FB Dental:          Pulmonary:Negative Pulmonary ROS and normal exam                               Cardiovascular:    (+) hypertension:, angina:, CAD:, hyperlipidemia        Rhythm: regular  Rate: normal Neuro/Psych:   (+) psychiatric history:            GI/Hepatic/Renal:   (+) GERD:, bowel prep,           Endo/Other: Negative Endo/Other ROS                    Abdominal:           Vascular: negative vascular ROS. Anesthesia Plan      MAC     ASA 3       Induction: intravenous. Anesthetic plan and risks discussed with patient. Plan discussed with attending.                   DULCE Rueda - CRNA   4/15/2021

## 2021-04-15 NOTE — H&P
Patient Name: Jason Holliday  : 1945  MRN: [id number]  DATE: 04/15/21      ENDOSCOPY  History and Physical    Procedure:    [] Diagnostic Colonoscopy       [x] Screening Colonoscopy  [x] EGD      [] ERCP      [] EUS       [] Other    [x] Previous office notes/History and Physical reviewed from the patients chart. Please see EMR for further details of HPI. I have examined the patient's status immediately prior to the procedure and:      Indications/HPI:    []Abdominal Pain   []Cancer- GI/Lung  []Fhx of colon CA  []History of Polyps   [x]h/o Shaws   []Melena  []Abnormal Imaging   []Dysphagia    []Persistent Pneumonia  []Anemia   []Food Impaction  [x]History of Polyps  []GI Bleed   []Pulmonary nodule/Mass  []Change in bowel habits  []Heartburn/Reflux  []Rectal Bleed (BRBPR)  []Chest Pain - Non Cardiac  []Heme (+) Stool  []Ulcers  []Constipation   []Hemoptysis   []Varices  []Diarrhea   []Hypoxemia  []Nausea/Vomiting   []Screening   []Crohns/Colitis  []Other:    Anesthesia:   [x] MAC [] Moderate Sedation   [] General   [] None     ROS: 12 pt Review of Symptoms was negative unless mentioned above    Medications:   Prior to Admission medications    Medication Sig Start Date End Date Taking?  Authorizing Provider   Na Sulfate-K Sulfate-Mg Sulf 17.5-3.13-1.6 GM/177ML SOLN As directed 21  Yes Cherisse Mortimer, MD   pantoprazole (PROTONIX) 40 MG tablet TAKE 1 TABLET DAILY 3/9/21  Yes Sunshine Trevino MD   psyllium (METAMUCIL) 28 % packet Take 1 packet by mouth 2 times daily Take with full glass of h20 or juice   Yes Historical Provider, MD   lisinopril (PRINIVIL;ZESTRIL) 10 MG tablet Take 1 tablet by mouth daily 20  Yes Sunshine Trevino MD   isosorbide mononitrate (IMDUR) 60 MG extended release tablet Take 2 tablets by mouth daily 19  Yes Freddy Harmon MD   atenolol (TENORMIN) 50 MG tablet Take 50 mg by mouth nightly  19  Yes Historical Provider, MD   clopidogrel (PLAVIX) 75 MG tablet Take 75 mg by mouth daily  7/23/19  Yes Historical Provider, MD   rosuvastatin (CRESTOR) 20 MG tablet Take 20 mg by mouth nightly  7/8/19  Yes Historical Provider, MD   aspirin 81 MG tablet Take 81 mg by mouth daily   Yes Historical Provider, MD   Cholecalciferol (VITAMIN D3) 1000 units TABS Take by mouth daily    Yes Historical Provider, MD   lidocaine (LIDODERM) 5 % PLACE 1 PATCH ON THE SKIN DAILY, 12 HOURS ON AND 12 HOURS OFF 8/7/20   Chace Antonio MD   Magnesium 400 MG CAPS Take 1 capsule by mouth daily  Patient taking differently: Take 1 capsule by mouth nightly  10/7/19   Chace Antonio MD   nitroGLYCERIN (NITROSTAT) 0.4 MG SL tablet Place 0.4 mg under the tongue every 5 minutes as needed for Chest pain up to max of 3 total doses. If no relief after 1 dose, call 911. Historical Provider, MD   Cyanocobalamin (VITAMIN B-12) 3000 MCG SUBL Take by mouth daily     Historical Provider, MD       Allergies:    Allergies   Allergen Reactions    Iodides Nausea And Vomiting     Contrast Dye    Oxycodone-Acetaminophen Nausea And Vomiting        History of allergic reaction to anesthesia:  No    Past Medical History:  Past Medical History:   Diagnosis Date    Cancer ([facility] [address]     prostate    Hyperlipidemia     Hypertension        Past Surgical History:  Past Surgical History:   Procedure Laterality Date    CARDIAC CATHETERIZATION  12/2019    CATARACT REMOVAL WITH IMPLANT Bilateral 06/2013    COLONOSCOPY  04/2016    CORONARY ANGIOPLASTY WITH STENT PLACEMENT  06/1998    CORONARY ANGIOPLASTY WITH STENT PLACEMENT  02/2007    CORONARY ANGIOPLASTY WITH STENT PLACEMENT  01/2009    CORONARY ANGIOPLASTY WITH STENT PLACEMENT  07/2012    HEMORRHOID SURGERY  11/2013    INGUINAL HERNIA REPAIR  05/1990    PROSTATE SURGERY  06/2003    removed    PROSTATE SURGERY  09/2013    biopsy    UPPER GASTROINTESTINAL ENDOSCOPY  09/2018    VASECTOMY         Social History:  Social History     Tobacco

## 2021-05-08 DIAGNOSIS — I10 ESSENTIAL HYPERTENSION: Chronic | ICD-10-CM

## 2021-05-10 RX ORDER — LISINOPRIL 10 MG/1
TABLET ORAL
Qty: 90 TABLET | Refills: 3 | Status: SHIPPED | OUTPATIENT
Start: 2021-05-10 | End: 2022-05-06

## 2021-05-10 NOTE — TELEPHONE ENCOUNTER
Pharmacy is  requesting medication refill.  Please approve or deny this request.    Rx requested:  Requested Prescriptions     Pending Prescriptions Disp Refills    lisinopril (PRINIVIL;ZESTRIL) 10 MG tablet [Pharmacy Med Name: LISINOPRIL TABS 10MG] 90 tablet 3     Sig: TAKE 1 TABLET DAILY         Last Office Visit:   9/17/2020      Next Visit Date:  Future Appointments   Date Time Provider [facility]   6/14/2021  8:00 AM Bernice Mcgee MD [facility]   9/16/2021  8:00 AM Liliam Burns MD [address] [place] [address]

## 2021-06-10 LAB — PROSTATE SPECIFIC ANTIGEN: 0.21 NG/ML (ref 0–6.22)

## 2021-06-14 ENCOUNTER — OFFICE VISIT (OUTPATIENT)
Dept: UROLOGY | Age: 76
End: 2021-06-14
Payer: MEDICARE

## 2021-06-14 VITALS
BODY MASS INDEX: 32.44 KG/M2 | WEIGHT: 190 LBS | DIASTOLIC BLOOD PRESSURE: 72 MMHG | SYSTOLIC BLOOD PRESSURE: 132 MMHG | HEART RATE: 51 BPM | HEIGHT: 64 IN

## 2021-06-14 DIAGNOSIS — C61 PROSTATE CANCER (HCC): Primary | ICD-10-CM

## 2021-06-14 PROCEDURE — G8427 DOCREV CUR MEDS BY ELIG CLIN: HCPCS | Performed by: UROLOGY

## 2021-06-14 PROCEDURE — 3017F COLORECTAL CA SCREEN DOC REV: CPT | Performed by: UROLOGY

## 2021-06-14 PROCEDURE — 1123F ACP DISCUSS/DSCN MKR DOCD: CPT | Performed by: UROLOGY

## 2021-06-14 PROCEDURE — 4040F PNEUMOC VAC/ADMIN/RCVD: CPT | Performed by: UROLOGY

## 2021-06-14 PROCEDURE — 99213 OFFICE O/P EST LOW 20 MIN: CPT | Performed by: UROLOGY

## 2021-06-14 PROCEDURE — 1036F TOBACCO NON-USER: CPT | Performed by: UROLOGY

## 2021-06-14 PROCEDURE — G8417 CALC BMI ABV UP PARAM F/U: HCPCS | Performed by: UROLOGY

## 2021-06-14 NOTE — PROGRESS NOTES
[facility] EVALUATION NOTE                                                 H&P                                                                                                                                                 Reason for Visit  Prostate cancer with PSA check    History of Present Illness  81-year-old male treated in an outside facility for prostate cancer with radiation therapy and hormonal therapy with eventual biochemical failure. Patient has decided since 2016 to undergo intermittent Lupron injections. Last Lupron injection was a 6-month formulation 1 year ago. PSA remains stable at 0.21. Patient is deferring Lupron injection today. Our plan is to continue his PSA checks every 6 months. Urologic Review of Systems/Symptoms  Unchanged    Review of Systems  Hospitalization: None recent  All 14 categories of Review of Systems otherwise reviewed no other findings reported.   No change in medical history  Past Medical History:   Diagnosis Date    Cancer [facility])     prostate    Hyperlipidemia     Hypertension      Past Surgical History:   Procedure Laterality Date    CARDIAC CATHETERIZATION  12/2019    CATARACT REMOVAL WITH IMPLANT Bilateral 06/2013    COLONOSCOPY  04/2016    COLONOSCOPY N/A 4/15/2021    COLORECTAL CANCER SCREENING, HIGH RISK performed by Lynne Sofia MD at [address]  06/1998    CORONARY ANGIOPLASTY WITH STENT PLACEMENT  02/2007    CORONARY ANGIOPLASTY WITH STENT PLACEMENT  01/2009    CORONARY ANGIOPLASTY WITH STENT PLACEMENT  07/2012    CYSTOSCOPY  04/15/2021    HEMORRHOID SURGERY  11/2013    INGUINAL HERNIA REPAIR  05/1990    PROSTATE SURGERY  06/2003    removed    PROSTATE SURGERY  09/2013    biopsy    UPPER GASTROINTESTINAL ENDOSCOPY  09/2018    UPPER GASTROINTESTINAL ENDOSCOPY N/A 4/15/2021    EGD DIAGNOSTIC ONLY performed by Lynne Sofia MD at [address] [facility] ENDOSCOPY  04/15/2021    VASECTOMY       Social History     Socioeconomic History    Marital status:      Spouse name: None    Number of children: None    Years of education: None    Highest education level: None   Occupational History    None   Tobacco Use    Smoking status: Former Smoker     Packs/day: 1.00     Years: 51.00     Pack years: 51.00     Types: Cigarettes     Start date: 58     Quit date: 3/9/2011     Years since quitting: 10.2    Smokeless tobacco: Never Used    Tobacco comment: started age 17    Vaping Use    Vaping Use: Never used   Substance and Sexual Activity    Alcohol use: Yes     Alcohol/week: 3.0 - 4.0 standard drinks     Types: 3 - 4 Cans of beer per week     Comment: 3-4 per week    Drug use: None    Sexual activity: Never   Other Topics Concern    None   Social History Narrative    None     Social Determinants of Health     Financial Resource Strain:     Difficulty of Paying Living Expenses:    Food Insecurity:     Worried About Running Out of Food in the Last Year:     Ran Out of Food in the Last Year:    Transportation Needs:     Lack of Transportation (Medical):      Lack of Transportation (Non-Medical):    Physical Activity:     Days of Exercise per Week:     Minutes of Exercise per Session:    Stress:     Feeling of Stress :    Social Connections:     Frequency of Communication with Friends and Family:     Frequency of Social Gatherings with Friends and Family:     Attends Anabaptism Services:     Active Member of Clubs or Organizations:     Attends Club or Organization Meetings:     Marital Status:    Intimate Partner Violence:     Fear of Current or Ex-Partner:     Emotionally Abused:     Physically Abused:     Sexually Abused:      Family History   Problem Relation Age of Onset    Cancer Father         prostate    Heart Disease Father     Cancer Brother         prostate    Heart Disease Brother     Heart Disease Paternal Lisa Raphael Colon Cancer Neg Hx      Current Outpatient Medications   Medication Sig Dispense Refill    lisinopril (PRINIVIL;ZESTRIL) 10 MG tablet TAKE 1 TABLET DAILY 90 tablet 3    pantoprazole (PROTONIX) 40 MG tablet TAKE 1 TABLET DAILY 90 tablet 1    psyllium (METAMUCIL) 28 % packet Take 1 packet by mouth 2 times daily Take with full glass of h20 or juice      lidocaine (LIDODERM) 5 % PLACE 1 PATCH ON THE SKIN DAILY, 12 HOURS ON AND 12 HOURS OFF 30 patch 3    isosorbide mononitrate (IMDUR) 60 MG extended release tablet Take 2 tablets by mouth daily 30 tablet 3    Magnesium 400 MG CAPS Take 1 capsule by mouth daily (Patient taking differently: Take 1 capsule by mouth nightly ) 90 capsule 4    atenolol (TENORMIN) 50 MG tablet Take 50 mg by mouth nightly       clopidogrel (PLAVIX) 75 MG tablet Take 75 mg by mouth daily       rosuvastatin (CRESTOR) 20 MG tablet Take 20 mg by mouth nightly       aspirin 81 MG tablet Take 81 mg by mouth daily      Cholecalciferol (VITAMIN D3) 1000 units TABS Take by mouth daily       Cyanocobalamin (VITAMIN B-12) 3000 MCG SUBL Take by mouth daily       nitroGLYCERIN (NITROSTAT) 0.4 MG SL tablet Place 0.4 mg under the tongue every 5 minutes as needed for Chest pain up to max of 3 total doses. If no relief after 1 dose, call 911. (Patient not taking: Reported on 6/14/2021)       No current facility-administered medications for this visit. Iodides  All reviewed and verified by Dr Adelbert Heimlich on today's visit    PSA   Date Value Ref Range Status   06/10/2021 0.21 0.00 - 6.22 ng/mL Final     Comment:     When the Total PSA is between 3.00 and 10.00 ng/mL, consider  requesting a Free PSA to aid in diagnosis. 12/10/2020 0.11 0.00 - 6.22 ng/mL Final   05/04/2020 0.30 0.00 - 6.22 ng/mL Final     No results found for this visit on 06/14/21.     Physical Exam  Vitals:    06/14/21 0812   BP: 132/72   Pulse: 51   Weight: 190 lb (86.2 kg)   Height: 5' 4\" (1.626 m)     Constitutional: Not in distress  No change in physical exam or urologic exam.  Assessment/Medical Necessity-Decision Making  Biochemical failure after prostate cancer treatment involving hormonal therapy and radiation therapy  Patient is decided to go with intermittent hormonal therapy  Deferring hormonal therapy this time around  Plan  PSA 6 months follow-up with a phone call PSA 1 year with follow-up in the office  Patient may come in earlier if PSA climbs for his next Lupron shot  Greater than 50% of 20 minutes spent consulting patient face-to-face  Orders Placed This Encounter   Procedures    PSA, Diagnostic     Standing Status:   Future     Standing Expiration Date:   6/14/2022    PSA, Diagnostic     Standing Status:   Future     Standing Expiration Date:   6/14/2022     No orders of the defined types were placed in this encounter. Pancho Judd MD       Please note this report has been partially produced using speech recognition software  And may cause contain errors related to that system including grammar, punctuation and spelling as well as words and phrases that may seem inappropriate. If there are questions or concerns please feel free to contact me to clarify.

## 2021-08-10 DIAGNOSIS — K22.719 BARRETT'S ESOPHAGUS WITH DYSPLASIA: Chronic | ICD-10-CM

## 2021-08-10 RX ORDER — PANTOPRAZOLE SODIUM 40 MG/1
TABLET, DELAYED RELEASE ORAL
Qty: 90 TABLET | Refills: 3 | Status: SHIPPED | OUTPATIENT
Start: 2021-08-10 | End: 2022-06-20 | Stop reason: SDUPTHER

## 2021-08-10 NOTE — TELEPHONE ENCOUNTER
Janna requesting medication refill.  Please approve or deny this request.    Rx requested:  Requested Prescriptions     Pending Prescriptions Disp Refills    pantoprazole (PROTONIX) 40 MG tablet [Pharmacy Med Name: PANTOPRAZOLE SODIUM DR TABS 40MG] 90 tablet 3     Sig: TAKE 1 TABLET DAILY         Last Office Visit:   9/17/2020      Next Visit Date:  Future Appointments   Date Time Provider Thiago Jeter   9/16/2021  8:00 AM Lars Lerma MD [facility]   6/20/2022  8:00 AM Sekou Dominguez MD [facility]

## 2021-08-30 ENCOUNTER — TELEPHONE (OUTPATIENT)
Dept: FAMILY MEDICINE CLINIC | Age: 76
End: 2021-08-30

## 2021-08-30 DIAGNOSIS — Z12.2 ENCOUNTER FOR SCREENING FOR LUNG CANCER: Primary | ICD-10-CM

## 2021-08-30 NOTE — TELEPHONE ENCOUNTER
Patient called in and said he received a letter stating it was time to schedule his annual CT of the chest for lung cancer. He is wondering if you are able to put the order in please?     Thank you

## 2021-09-09 ENCOUNTER — TELEPHONE (OUTPATIENT)
Dept: CASE MANAGEMENT | Age: 76
End: 2021-09-09

## 2021-09-09 LAB
ALBUMIN SERPL-MCNC: 4.4 G/DL (ref 3.5–4.6)
ALP BLD-CCNC: 76 U/L (ref 35–104)
ALT SERPL-CCNC: 17 U/L (ref 0–41)
ANION GAP SERPL CALCULATED.3IONS-SCNC: 12 MEQ/L (ref 9–15)
AST SERPL-CCNC: 19 U/L (ref 0–40)
BILIRUB SERPL-MCNC: 1 MG/DL (ref 0.2–0.7)
BUN BLDV-MCNC: 21 MG/DL (ref 8–23)
CALCIUM SERPL-MCNC: 9.5 MG/DL (ref 8.5–9.9)
CHLORIDE BLD-SCNC: 103 MEQ/L (ref 95–107)
CHOLESTEROL, TOTAL: 154 MG/DL (ref 0–199)
CO2: 26 MEQ/L (ref 20–31)
CREAT SERPL-MCNC: 1.25 MG/DL (ref 0.7–1.2)
GFR AFRICAN AMERICAN: >60
GFR NON-AFRICAN AMERICAN: 56.4
GLOBULIN: 1.8 G/DL (ref 2.3–3.5)
GLUCOSE BLD-MCNC: 99 MG/DL (ref 70–99)
HCT VFR BLD CALC: 42.9 % (ref 42–52)
HDLC SERPL-MCNC: 58 MG/DL (ref 40–59)
HEMOGLOBIN: 14.8 G/DL (ref 14–18)
LDL CHOLESTEROL CALCULATED: 68 MG/DL (ref 0–129)
MCH RBC QN AUTO: 31.1 PG (ref 27–31.3)
MCHC RBC AUTO-ENTMCNC: 34.5 % (ref 33–37)
MCV RBC AUTO: 90.1 FL (ref 80–100)
PDW BLD-RTO: 13.7 % (ref 11.5–14.5)
PLATELET # BLD: 127 K/UL (ref 130–400)
POTASSIUM SERPL-SCNC: 4.7 MEQ/L (ref 3.4–4.9)
RBC # BLD: 4.76 M/UL (ref 4.7–6.1)
SODIUM BLD-SCNC: 141 MEQ/L (ref 135–144)
TOTAL PROTEIN: 6.2 G/DL (ref 6.3–8)
TRIGL SERPL-MCNC: 141 MG/DL (ref 0–150)
WBC # BLD: 4 K/UL (ref 4.8–10.8)

## 2021-09-09 NOTE — TELEPHONE ENCOUNTER
Physician documentation on smoking history and CT Lung Screening reviewed. All required documentation complete. Patient is a former smoker (quit 2011) with a 51 pack year history ( 1 ppd x 51 years) per physician documentation.

## 2021-09-16 ENCOUNTER — OFFICE VISIT (OUTPATIENT)
Dept: FAMILY MEDICINE CLINIC | Age: 76
End: 2021-09-16
Payer: MEDICARE

## 2021-09-16 VITALS
RESPIRATION RATE: 16 BRPM | BODY MASS INDEX: 34.52 KG/M2 | WEIGHT: 202.2 LBS | HEART RATE: 102 BPM | DIASTOLIC BLOOD PRESSURE: 98 MMHG | HEIGHT: 64 IN | OXYGEN SATURATION: 97 % | SYSTOLIC BLOOD PRESSURE: 178 MMHG | TEMPERATURE: 96.4 F

## 2021-09-16 DIAGNOSIS — I20.8 STABLE ANGINA (HCC): ICD-10-CM

## 2021-09-16 DIAGNOSIS — E78.00 PURE HYPERCHOLESTEROLEMIA: ICD-10-CM

## 2021-09-16 DIAGNOSIS — I25.10 CORONARY ARTERY DISEASE INVOLVING NATIVE CORONARY ARTERY OF NATIVE HEART WITHOUT ANGINA PECTORIS: ICD-10-CM

## 2021-09-16 DIAGNOSIS — B35.6 TINEA CRURIS: ICD-10-CM

## 2021-09-16 DIAGNOSIS — R73.09 ABNORMAL GLUCOSE: ICD-10-CM

## 2021-09-16 DIAGNOSIS — Z23 NEED FOR INFLUENZA VACCINATION: ICD-10-CM

## 2021-09-16 DIAGNOSIS — I10 ESSENTIAL HYPERTENSION: Primary | ICD-10-CM

## 2021-09-16 DIAGNOSIS — K22.719 BARRETT'S ESOPHAGUS WITH DYSPLASIA: ICD-10-CM

## 2021-09-16 PROBLEM — I20.0 UNSTABLE ANGINA (HCC): Status: RESOLVED | Noted: 2019-12-02 | Resolved: 2021-09-16

## 2021-09-16 PROBLEM — I20.89 STABLE ANGINA: Status: ACTIVE | Noted: 2021-09-16

## 2021-09-16 PROBLEM — I20.89 STABLE ANGINA: Status: RESOLVED | Noted: 2021-09-16 | Resolved: 2021-09-16

## 2021-09-16 LAB — HBA1C MFR BLD: 5.6 %

## 2021-09-16 PROCEDURE — 3017F COLORECTAL CA SCREEN DOC REV: CPT | Performed by: FAMILY MEDICINE

## 2021-09-16 PROCEDURE — 99214 OFFICE O/P EST MOD 30 MIN: CPT | Performed by: FAMILY MEDICINE

## 2021-09-16 PROCEDURE — 1036F TOBACCO NON-USER: CPT | Performed by: FAMILY MEDICINE

## 2021-09-16 PROCEDURE — 83036 HEMOGLOBIN GLYCOSYLATED A1C: CPT | Performed by: FAMILY MEDICINE

## 2021-09-16 PROCEDURE — G8417 CALC BMI ABV UP PARAM F/U: HCPCS | Performed by: FAMILY MEDICINE

## 2021-09-16 PROCEDURE — G0008 ADMIN INFLUENZA VIRUS VAC: HCPCS | Performed by: FAMILY MEDICINE

## 2021-09-16 PROCEDURE — 1123F ACP DISCUSS/DSCN MKR DOCD: CPT | Performed by: FAMILY MEDICINE

## 2021-09-16 PROCEDURE — 4040F PNEUMOC VAC/ADMIN/RCVD: CPT | Performed by: FAMILY MEDICINE

## 2021-09-16 PROCEDURE — 90694 VACC AIIV4 NO PRSRV 0.5ML IM: CPT | Performed by: FAMILY MEDICINE

## 2021-09-16 PROCEDURE — G8427 DOCREV CUR MEDS BY ELIG CLIN: HCPCS | Performed by: FAMILY MEDICINE

## 2021-09-16 RX ORDER — KETOCONAZOLE 20 MG/G
CREAM TOPICAL
Qty: 120 G | Refills: 1 | Status: SHIPPED | OUTPATIENT
Start: 2021-09-16 | End: 2022-03-10

## 2021-09-16 SDOH — ECONOMIC STABILITY: FOOD INSECURITY: WITHIN THE PAST 12 MONTHS, THE FOOD YOU BOUGHT JUST DIDN'T LAST AND YOU DIDN'T HAVE MONEY TO GET MORE.: NEVER TRUE

## 2021-09-16 SDOH — ECONOMIC STABILITY: FOOD INSECURITY: WITHIN THE PAST 12 MONTHS, YOU WORRIED THAT YOUR FOOD WOULD RUN OUT BEFORE YOU GOT MONEY TO BUY MORE.: NEVER TRUE

## 2021-09-16 SDOH — ECONOMIC STABILITY: TRANSPORTATION INSECURITY
IN THE PAST 12 MONTHS, HAS LACK OF TRANSPORTATION KEPT YOU FROM MEETINGS, WORK, OR FROM GETTING THINGS NEEDED FOR DAILY LIVING?: NO

## 2021-09-16 SDOH — ECONOMIC STABILITY: TRANSPORTATION INSECURITY
IN THE PAST 12 MONTHS, HAS THE LACK OF TRANSPORTATION KEPT YOU FROM MEDICAL APPOINTMENTS OR FROM GETTING MEDICATIONS?: NO

## 2021-09-16 ASSESSMENT — SOCIAL DETERMINANTS OF HEALTH (SDOH): HOW HARD IS IT FOR YOU TO PAY FOR THE VERY BASICS LIKE FOOD, HOUSING, MEDICAL CARE, AND HEATING?: NOT HARD AT ALL

## 2021-09-16 NOTE — PROGRESS NOTES
Subjective  Arpita Linares, 78 y.o. male presents today with:  Chief Complaint   Patient presents with    6 Month Follow-Up    Rash     in groin area x1-2weeks            HPI    Patient is here for follow-up of CAD/HTN/angina. No chest pain, shortness of breath, palpitations, edema, paroxysmal nocturnal dyspnea, orthopnea. Is compliant with meds which do not cause significant side effects. Avoids added salt; exercises regularly. and tries to eat a healthy diet. Off Plavix now. /66. Patient is here for hyperlipidemia. Is compliant with medications and has no apparent side effects from them. Rash. X 2 weeks. Itchy, slightly painful in left groin. Hydrocortisone no help[. Red ring to it. No blisters. No other questions and or concerns for today's visit    Review of Systems  See above.      Past Medical History:   Diagnosis Date    Cancer [facility])     prostate    Hyperlipidemia     Hypertension      Past Surgical History:   Procedure Laterality Date    CARDIAC CATHETERIZATION  12/2019    CATARACT REMOVAL WITH IMPLANT Bilateral 06/2013    COLONOSCOPY  04/2016    COLONOSCOPY N/A 4/15/2021    COLORECTAL CANCER SCREENING, HIGH RISK performed by Jorge Purdy MD at [address] [facility]  06/1998    CORONARY ANGIOPLASTY WITH STENT PLACEMENT  02/2007    CORONARY ANGIOPLASTY WITH STENT PLACEMENT  01/2009    CORONARY ANGIOPLASTY WITH STENT PLACEMENT  07/2012    CYSTOSCOPY  04/15/2021    HEMORRHOID SURGERY  11/2013    INGUINAL HERNIA REPAIR  05/1990    PROSTATE SURGERY  06/2003    removed    PROSTATE SURGERY  09/2013    biopsy    UPPER GASTROINTESTINAL ENDOSCOPY  09/2018    UPPER GASTROINTESTINAL ENDOSCOPY N/A 4/15/2021    EGD DIAGNOSTIC ONLY performed by Jorge Purdy MD at [address] [place] [address]  04/15/2021    VASECTOMY       Social History     Socioeconomic History    Marital status:      Spouse name: Not on file  Number of children: Not on file    Years of education: Not on file    Highest education level: Not on file   Occupational History    Not on file   Tobacco Use    Smoking status: Former Smoker     Packs/day: 1.00     Years: 51.00     Pack years: 51.00     Types: Cigarettes     Start date: 58     Quit date: 3/9/2011     Years since quitting: 10.5    Smokeless tobacco: Never Used    Tobacco comment: started age 17    Vaping Use    Vaping Use: Never used   Substance and Sexual Activity    Alcohol use: Yes     Alcohol/week: 3.0 - 4.0 standard drinks     Types: 3 - 4 Cans of beer per week     Comment: 3-4 per week    Drug use: Not on file    Sexual activity: Never   Other Topics Concern    Not on file   Social History Narrative    Not on file     Social Determinants of Health     Financial Resource Strain: Low Risk     Difficulty of Paying Living Expenses: Not hard at all   Food Insecurity: No Food Insecurity    Worried About Running Out of Food in the Last Year: Never true    [address] in the Last Year: Never true   Transportation Needs: No Transportation Needs    Lack of Transportation (Medical): No    Lack of Transportation (Non-Medical):  No   Physical Activity:     Days of Exercise per Week:     Minutes of Exercise per Session:    Stress:     Feeling of Stress :    Social Connections:     Frequency of Communication with Friends and Family:     Frequency of Social Gatherings with Friends and Family:     Attends Mu-ism Services:     Active Member of Clubs or Organizations:     Attends Club or Organization Meetings:     Marital Status:    Intimate Partner Violence:     Fear of Current or Ex-Partner:     Emotionally Abused:     Physically Abused:     Sexually Abused:      Family History   Problem Relation Age of Onset    Cancer Father         prostate    Heart Disease Father     Cancer Brother         prostate    Heart Disease Brother     Heart Disease Paternal Chrissy Murry Colon Cancer Neg Hx      Allergies   Allergen Reactions    Iodides Nausea And Vomiting     Contrast Dye     Current Outpatient Medications   Medication Sig Dispense Refill    ketoconazole (NIZORAL) 2 % cream Apply topically twice daily for at least 4 weeks (or for 1 week after lesions have healed). 120 g 1    pantoprazole (PROTONIX) 40 MG tablet TAKE 1 TABLET DAILY 90 tablet 3    lisinopril (PRINIVIL;ZESTRIL) 10 MG tablet TAKE 1 TABLET DAILY 90 tablet 3    psyllium (METAMUCIL) 28 % packet Take 1 packet by mouth 2 times daily Take with full glass of h20 or juice      lidocaine (LIDODERM) 5 % PLACE 1 PATCH ON THE SKIN DAILY, 12 HOURS ON AND 12 HOURS OFF 30 patch 3    isosorbide mononitrate (IMDUR) 60 MG extended release tablet Take 2 tablets by mouth daily 30 tablet 3    Magnesium 400 MG CAPS Take 1 capsule by mouth daily (Patient taking differently: Take 1 capsule by mouth nightly ) 90 capsule 4    atenolol (TENORMIN) 50 MG tablet Take 50 mg by mouth nightly       rosuvastatin (CRESTOR) 20 MG tablet Take 20 mg by mouth nightly       nitroGLYCERIN (NITROSTAT) 0.4 MG SL tablet Place 0.4 mg under the tongue every 5 minutes as needed for Chest pain up to max of 3 total doses. If no relief after 1 dose, call 911.  aspirin 81 MG tablet Take 81 mg by mouth daily      Cholecalciferol (VITAMIN D3) 1000 units TABS Take by mouth daily       clopidogrel (PLAVIX) 75 MG tablet Take 75 mg by mouth daily  (Patient not taking: Reported on 9/16/2021)      Cyanocobalamin (VITAMIN B-12) 3000 MCG SUBL Take by mouth daily  (Patient not taking: Reported on 9/16/2021)       No current facility-administered medications for this visit. PMH, Surgical Hx, Family Hx, and Social Hxreviewed and updated. Health Maintenance reviewed.     Objective    Vitals:    09/16/21 0804 09/16/21 0811   BP: (!) 180/100 (!) 178/98   Site: Right Upper Arm Left Upper Arm   Position: Sitting Sitting   Cuff Size: Medium Adult Medium Adult Pulse: 102    Resp: 16    Temp: 96.4 °F (35.8 °C)    TempSrc: Temporal    SpO2: 97%    Weight: 202 lb 3.2 oz (91.7 kg)    Height: 5' 4\" (1.626 m)         Physical Exam      Lab Results   Component Value Date    LABA1C 5.6 09/16/2021    LABA1C 5.9 02/13/2020     Lab Results   Component Value Date    CREATININE 1.25 (H) 09/09/2021     Lab Results   Component Value Date    ALT 17 09/09/2021    AST 19 09/09/2021     Lab Results   Component Value Date    CHOL 154 09/09/2021    TRIG 141 09/09/2021    HDL 58 09/09/2021    LDLCALC 68 09/09/2021        Assessment & Plan   Visit Diagnoses and Associated Orders     Essential hypertension    -  Primary    Home cuff correlates with BP here. BP at home WNL. WIll follow at home for 10 daysa then reeval.         Stable angina ([address]        stable well-controlled. Resolved. Shaw's esophagus with dysplasia        Followed by Dr. Bridger Martinez. Repeat EGD annually. Coronary artery disease involving native coronary artery of native heart without angina pectoris        stable and well-controlled on current meds. Followed by Dr. Belén Wall. [facility] sake. [address] [facility] [place], [place] [EIB39 Custom]           Abnormal glucose        Normal A1C. POCT glycosylated hemoglobin (Hb A1C) [55415 Custom]           Pure hypercholesterolemia        Stable and well-controlled.          Need for influenza vaccination        INFLUENZA, QUADV, ADJUVANTED, 65 YRS =, IM, PF, PREFILL SYR, 0.5ML (FLUAD) [25016 Custom]           Tinea cruris             ORDERS WITHOUT AN ASSOCIATED DIAGNOSIS    ketoconazole (NIZORAL) 2 % cream [48919]              Reviewed with the patient: all disease processes, current clinical status, medications, activities and diet.      Side effects, adverse effects of the medication prescribed today, as well as treatment plan/ rationale and result expectations have been discussed with the patient who expresses understanding and desires to proceed.     Close follow up to evaluate treatment results and for coordination of care. I have reviewed the patient's medical history in detail and updated the computerized patient record. More than 50% of the appointment was spent in face-to-face counseling, education and care coordination. Orders Placed This Encounter   Procedures    INFLUENZA, QUADV, ADJUVANTED, 72 YRS =, IM, PF, PREFILL SYR, 0.5ML (FLUAD)    Nevaeh Akbar DO, Cardiology, [place]     Referral Priority:   Routine     Referral Type:   Eval and Treat     Referral Reason:   Specialty Services Required     Referred to Provider:   Curt Saravia DO     Requested Specialty:   Cardiology     Number of Visits Requested:   1    POCT glycosylated hemoglobin (Hb A1C)     Orders Placed This Encounter   Medications    ketoconazole (NIZORAL) 2 % cream     Sig: Apply topically twice daily for at least 4 weeks (or for 1 week after lesions have healed). Dispense:  120 g     Refill:  1     There are no discontinued medications. Return in about 6 months (around 3/16/2022) for please schedule 10 day f/u of HTN OV and 6 month f/u HTN, CAD OV. Controlled Substance Monitoring:    Acute and Chronic Pain Monitoring:   No flowsheet data found.         Bibiana Cortés MD

## 2021-09-23 ENCOUNTER — HOSPITAL ENCOUNTER (OUTPATIENT)
Dept: CT IMAGING | Age: 76
Discharge: HOME | End: 2021-09-25
Payer: MEDICARE

## 2021-09-23 DIAGNOSIS — Z12.2 ENCOUNTER FOR SCREENING FOR LUNG CANCER: ICD-10-CM

## 2021-09-23 PROCEDURE — 71271 CT THORAX LUNG CANCER SCR C-: CPT

## 2021-09-28 ENCOUNTER — OFFICE VISIT (OUTPATIENT)
Dept: FAMILY MEDICINE CLINIC | Age: 76
End: 2021-09-28
Payer: MEDICARE

## 2021-09-28 VITALS
HEART RATE: 55 BPM | SYSTOLIC BLOOD PRESSURE: 134 MMHG | OXYGEN SATURATION: 98 % | DIASTOLIC BLOOD PRESSURE: 82 MMHG | RESPIRATION RATE: 16 BRPM | WEIGHT: 200.4 LBS | HEIGHT: 64 IN | TEMPERATURE: 95.3 F | BODY MASS INDEX: 34.21 KG/M2

## 2021-09-28 DIAGNOSIS — I25.10 CORONARY ARTERY DISEASE INVOLVING NATIVE CORONARY ARTERY OF NATIVE HEART WITHOUT ANGINA PECTORIS: Chronic | ICD-10-CM

## 2021-09-28 DIAGNOSIS — I10 ESSENTIAL HYPERTENSION: Primary | Chronic | ICD-10-CM

## 2021-09-28 PROCEDURE — 99213 OFFICE O/P EST LOW 20 MIN: CPT | Performed by: FAMILY MEDICINE

## 2021-09-28 PROCEDURE — 1036F TOBACCO NON-USER: CPT | Performed by: FAMILY MEDICINE

## 2021-09-28 PROCEDURE — G8427 DOCREV CUR MEDS BY ELIG CLIN: HCPCS | Performed by: FAMILY MEDICINE

## 2021-09-28 PROCEDURE — G8417 CALC BMI ABV UP PARAM F/U: HCPCS | Performed by: FAMILY MEDICINE

## 2021-09-28 PROCEDURE — 3017F COLORECTAL CA SCREEN DOC REV: CPT | Performed by: FAMILY MEDICINE

## 2021-09-28 PROCEDURE — 1123F ACP DISCUSS/DSCN MKR DOCD: CPT | Performed by: FAMILY MEDICINE

## 2021-09-28 PROCEDURE — 4040F PNEUMOC VAC/ADMIN/RCVD: CPT | Performed by: FAMILY MEDICINE

## 2021-09-28 NOTE — PROGRESS NOTES
Subjective  Kenneth Aguirre, 78 y.o. male presents today with:  Chief Complaint   Patient presents with    Hypertension     10 day f/u            HPI    BP was elevated here. Home cuff correlates. Now normal and BPs at home have been WNL. NO CP, SOB, palps, edema, nausea, vomiting, headaches, vision changes.         No other questions and or concerns for today's visit      Review of Systems see above      Past Medical History:   Diagnosis Date    Cancer [facility])     prostate    Hyperlipidemia     Hypertension      Past Surgical History:   Procedure Laterality Date    CARDIAC CATHETERIZATION  12/2019    CATARACT REMOVAL WITH IMPLANT Bilateral 06/2013    COLONOSCOPY  04/2016    COLONOSCOPY N/A 4/15/2021    COLORECTAL CANCER SCREENING, HIGH RISK performed by Rafa Erickson MD at [address] [facility]  06/1998    CORONARY ANGIOPLASTY WITH STENT PLACEMENT  02/2007    CORONARY ANGIOPLASTY WITH STENT PLACEMENT  01/2009    CORONARY ANGIOPLASTY WITH STENT PLACEMENT  07/2012    CYSTOSCOPY  04/15/2021    HEMORRHOID SURGERY  11/2013    INGUINAL HERNIA REPAIR  05/1990    PROSTATE SURGERY  06/2003    removed    PROSTATE SURGERY  09/2013    biopsy    UPPER GASTROINTESTINAL ENDOSCOPY  09/2018    UPPER GASTROINTESTINAL ENDOSCOPY N/A 4/15/2021    EGD DIAGNOSTIC ONLY performed by Rafa Erickson MD at [facility]  04/15/2021    VASECTOMY       Social History     Socioeconomic History    Marital status:      Spouse name: Not on file    Number of children: Not on file    Years of education: Not on file    Highest education level: Not on file   Occupational History    Not on file   Tobacco Use    Smoking status: Former Smoker     Packs/day: 1.00     Years: 51.00     Pack years: 51.00     Types: Cigarettes     Start date: 58     Quit date: 3/9/2011     Years since quitting: 10.5    Smokeless tobacco: Never Used    Tobacco comment: started age 17    Vaping Use    Vaping Use: Never used   Substance and Sexual Activity    Alcohol use: Yes     Alcohol/week: 3.0 - 4.0 standard drinks     Types: 3 - 4 Cans of beer per week     Comment: 3-4 per week    Drug use: Not on file    Sexual activity: Never   Other Topics Concern    Not on file   Social History Narrative    Not on file     Social Determinants of Health     Financial Resource Strain: Low Risk     Difficulty of Paying Living Expenses: Not hard at all   Food Insecurity: No Food Insecurity    Worried About Running Out of Food in the Last Year: Never true    [address] in the Last Year: Never true   Transportation Needs: No Transportation Needs    Lack of Transportation (Medical): No    Lack of Transportation (Non-Medical):  No   Physical Activity:     Days of Exercise per Week:     Minutes of Exercise per Session:    Stress:     Feeling of Stress :    Social Connections:     Frequency of Communication with Friends and Family:     Frequency of Social Gatherings with Friends and Family:     Attends Yazidi Services:     Active Member of Clubs or Organizations:     Attends Club or Organization Meetings:     Marital Status:    Intimate Partner Violence:     Fear of Current or Ex-Partner:     Emotionally Abused:     Physically Abused:     Sexually Abused:      Family History   Problem Relation Age of Onset    Cancer Father         prostate    Heart Disease Father     Cancer Brother         prostate    Heart Disease Brother     Heart Disease Paternal Uncle     Colon Cancer Neg Hx      Allergies   Allergen Reactions    Iodides Nausea And Vomiting     Contrast Dye     Current Outpatient Medications   Medication Sig Dispense Refill    pantoprazole (PROTONIX) 40 MG tablet TAKE 1 TABLET DAILY 90 tablet 3    lisinopril (PRINIVIL;ZESTRIL) 10 MG tablet TAKE 1 TABLET DAILY 90 tablet 3    psyllium (METAMUCIL) 28 % packet Take 1 packet by mouth 2 times daily Take with full glass of h20 or juice      lidocaine (LIDODERM) 5 % PLACE 1 PATCH ON THE SKIN DAILY, 12 HOURS ON AND 12 HOURS OFF 30 patch 3    isosorbide mononitrate (IMDUR) 60 MG extended release tablet Take 2 tablets by mouth daily 30 tablet 3    Magnesium 400 MG CAPS Take 1 capsule by mouth daily (Patient taking differently: Take 1 capsule by mouth nightly ) 90 capsule 4    atenolol (TENORMIN) 50 MG tablet Take 50 mg by mouth nightly       rosuvastatin (CRESTOR) 20 MG tablet Take 20 mg by mouth nightly       nitroGLYCERIN (NITROSTAT) 0.4 MG SL tablet Place 0.4 mg under the tongue every 5 minutes as needed for Chest pain up to max of 3 total doses. If no relief after 1 dose, call 911.  aspirin 81 MG tablet Take 81 mg by mouth daily      Cholecalciferol (VITAMIN D3) 1000 units TABS Take by mouth daily       ketoconazole (NIZORAL) 2 % cream Apply topically twice daily for at least 4 weeks (or for 1 week after lesions have healed). (Patient not taking: Reported on 9/28/2021) 120 g 1    clopidogrel (PLAVIX) 75 MG tablet Take 75 mg by mouth daily  (Patient not taking: Reported on 9/16/2021)      Cyanocobalamin (VITAMIN B-12) 3000 MCG SUBL Take by mouth daily  (Patient not taking: Reported on 9/16/2021)       No current facility-administered medications for this visit. PMH, Surgical Hx, Family Hx, and Social Hxreviewed and updated. Health Maintenance reviewed. Objective    Vitals:    09/28/21 0853   BP: 134/82   Pulse: 55   Resp: 16   Temp: 95.3 °F (35.2 °C)   TempSrc: Temporal   SpO2: 98%   Weight: 200 lb 6.4 oz (90.9 kg)   Height: 5' 4\" (1.626 m)        Physical Exam  Constitutional:       Appearance: He is well-developed. HENT:      Head: Normocephalic and atraumatic. Eyes:      Conjunctiva/sclera: Conjunctivae normal.   Pulmonary:      Effort: Pulmonary effort is normal.   Neurological:      Mental Status: He is alert and oriented to person, place, and time.            Lab Results   Component Value Date    LABA1C 5.6 09/16/2021    LABA1C 5.9 02/13/2020     Lab Results   Component Value Date    CREATININE 1.25 (H) 09/09/2021     Lab Results   Component Value Date    ALT 17 09/09/2021    AST 19 09/09/2021     Lab Results   Component Value Date    CHOL 154 09/09/2021    TRIG 141 09/09/2021    HDL 58 09/09/2021    LDLCALC 68 09/09/2021        Assessment & Plan   Visit Diagnoses and Associated Orders     Essential hypertension    -  Primary    Stable and well-controlled on current meds         Coronary artery disease involving native coronary artery of native heart without angina pectoris        Stable and well-controlled on current meds                 Reviewed with the patient: all disease processes, current clinical status, medications, activities and diet.      Side effects, adverse effects of the medication prescribed today, as well as treatment plan/ rationale and result expectations have been discussed with the patient who expresses understanding and desires to proceed.     Close follow up to evaluate treatment results and for coordination of care. I have reviewed the patient's medical history in detail and updated the computerized patient record. More than 50% of the appointment was spent in face-to-face counseling, education and care coordination. No orders of the defined types were placed in this encounter. No orders of the defined types were placed in this encounter. There are no discontinued medications. Return in about 6 months (around 3/28/2022) for HTN, CAD - has f/u scheduled. Controlled Substance Monitoring:    Acute and Chronic Pain Monitoring:   No flowsheet data found.         Marta Saucedo MD

## 2021-12-06 DIAGNOSIS — C61 PROSTATE CANCER (HCC): ICD-10-CM

## 2021-12-06 LAB — PROSTATE SPECIFIC ANTIGEN: 10.53 NG/ML (ref 0–4)

## 2021-12-21 ENCOUNTER — HOSPITAL ENCOUNTER (OUTPATIENT)
Dept: NUCLEAR MEDICINE | Age: 76
Discharge: HOME | End: 2021-12-23
Payer: MEDICARE

## 2021-12-21 DIAGNOSIS — R97.21 INCREASING PROSTATE SPECIFIC ANTIGEN (PSA) LEVEL AFTER TREATMENT FOR MALIGNANT NEOPLASM OF PROSTATE: ICD-10-CM

## 2021-12-21 PROCEDURE — 3430000000 HC RX DIAGNOSTIC RADIOPHARMACEUTICAL: Performed by: INTERNAL MEDICINE

## 2021-12-21 PROCEDURE — 78306 BONE IMAGING WHOLE BODY: CPT

## 2021-12-21 PROCEDURE — A9503 TC99M MEDRONATE: HCPCS | Performed by: INTERNAL MEDICINE

## 2021-12-21 RX ORDER — TC 99M MEDRONATE 20 MG/10ML
25 INJECTION, POWDER, LYOPHILIZED, FOR SOLUTION INTRAVENOUS
Status: COMPLETED | OUTPATIENT
Start: 2021-12-21 | End: 2021-12-21

## 2021-12-21 RX ADMIN — TC 99M MEDRONATE 26.3 MILLICURIE: 20 INJECTION, POWDER, LYOPHILIZED, FOR SOLUTION INTRAVENOUS at 09:00

## 2021-12-28 ENCOUNTER — HOSPITAL ENCOUNTER (OUTPATIENT)
Dept: CT IMAGING | Age: 76
Discharge: HOME | End: 2021-12-30
Payer: MEDICARE

## 2021-12-28 DIAGNOSIS — R97.21 INCREASING PROSTATE SPECIFIC ANTIGEN (PSA) LEVEL AFTER TREATMENT FOR MALIGNANT NEOPLASM OF PROSTATE: ICD-10-CM

## 2021-12-28 LAB
GFR AFRICAN AMERICAN: 60
GFR NON-AFRICAN AMERICAN: 49
PERFORMED ON: ABNORMAL
POC CREATININE: 1.4 MG/DL (ref 0.8–1.3)
POC SAMPLE TYPE: ABNORMAL

## 2021-12-28 PROCEDURE — 74177 CT ABD & PELVIS W/CONTRAST: CPT

## 2021-12-28 PROCEDURE — 6360000004 HC RX CONTRAST MEDICATION: Performed by: INTERNAL MEDICINE

## 2021-12-28 PROCEDURE — 2500000003 HC RX 250 WO HCPCS: Performed by: INTERNAL MEDICINE

## 2021-12-28 RX ORDER — SODIUM CHLORIDE 0.9 % (FLUSH) 0.9 %
10 SYRINGE (ML) INJECTION
Status: DISPENSED | OUTPATIENT
Start: 2021-12-28 | End: 2021-12-28

## 2021-12-28 RX ADMIN — IOPAMIDOL 100 ML: 612 INJECTION, SOLUTION INTRAVENOUS at 08:32

## 2021-12-28 RX ADMIN — BARIUM SULFATE 450 ML: 20 SUSPENSION ORAL at 07:35

## 2022-01-26 ENCOUNTER — HOSPITAL ENCOUNTER (OUTPATIENT)
Dept: RADIATION ONCOLOGY | Age: 77
Discharge: HOME | End: 2022-01-26
Payer: MEDICARE

## 2022-01-26 VITALS
BODY MASS INDEX: 33.67 KG/M2 | WEIGHT: 197.2 LBS | RESPIRATION RATE: 20 BRPM | DIASTOLIC BLOOD PRESSURE: 101 MMHG | TEMPERATURE: 96.5 F | HEART RATE: 56 BPM | OXYGEN SATURATION: 96 % | SYSTOLIC BLOOD PRESSURE: 155 MMHG | HEIGHT: 64 IN

## 2022-01-26 PROCEDURE — 99205 OFFICE O/P NEW HI 60 MIN: CPT | Performed by: RADIOLOGY

## 2022-01-26 PROCEDURE — 99212 OFFICE O/P EST SF 10 MIN: CPT | Performed by: RADIOLOGY

## 2022-01-26 NOTE — PROGRESS NOTES
SW visit with Pt prior to his Radiation Oncology Consult with Dr. Madi Massey. Pt states a distress level of 6/10; states primarily due to being discouraged re: prostate cancer returning after having prostate surgery \"19 years ago\". He thought choosing surgery over radiation was a good decision at that time, \"so I would not have to worry about it again\". SW provided emotional support while validating and normalizing his feelings of discouragement with the recurrence of cancer. Pt states has experienced \"depression\" over the past several years; \"I think about death more\". SW inquired re: suicidal ideations, plans, etc. He states no ideations or plans of suicide; states thinks about mortality issues as he and spouse are \"getting older\". SW encouraged follow-up with his PCP re: depression issues. He states has had conversations with PCP; medications for depression were explored, however he declines taking \"mind-altering drugs\". SW suggested revisiting this conversation with PCP in light of cancer recurrence. SW provided phone contacts for [employer] line ([phone]) and Crisis Hotline ([phone]) as a resource for Pt should future needs arise. He reluctantly accepted these phone numbers as stated no current need. Pt has good family support (a daughter in [place] and a son in [place]); resides with his spouse. He states retired after 28 years as a  ([employer]) in 2003 and as a [employer] with [employer]. He stays socially connected with W post 1-2x month (has been less than usual due to [facility] environment). Pt states in the process of \"moving all my physicians to the [facility]"  Including Dr. Eros Wong, cardiologist. Aj Hernandez provided business card contact for future support as needed.

## 2022-01-26 NOTE — CONSULTS
NURSING ASSESSMENT     Date: 1/26/2022        Patient Name: Stacey Marks     YOB: 1945      Age:  78 y.o. MRN: [id number]       Chaperone [] Yes   [x] No      Advance Directives:   Do you currently have completed advance directives (living will)? [x] Yes   [] No         *If yes, please bring us a copy for your records. *If no, would you like info or assistance in completing advance directives (living will)? [] Yes   [x] No    Pain Score:   Pain Score (1-10): None at this time, but \"3\" when develops  Pain Location: Left knee and lower back  Pain Duration: Mostly at night   Pain Management/Control: [employer]      Is pain affecting your ability to take care of yourself or move throughout your home? [] Yes   [x] No    General: Fatigue  Patient has gained weight [] Yes   [x] No  Patient has lost weight [] Yes   [x] No  How much weight in pounds and over what length of time:     Eyes (Ophthalmic)Wears corrective eye glasses     Skin (Dermatological): No Problems     ENT: Hearing Loss, wears bilat hearing aids     Respiratory: PADILLA     Cardiovascular: No Problems      Device   [] Yes   [x] No   Copy of Card Obtained [] Yes   [x] No    Gastrointestinal:Has 3-4 loose BM's every day, usually all in the morning    Genito-Urinary:   Frequency   Nocturia times 2    Incontinence Now and again   Weak stream        Breast: No Problems     Musculoskeletal: Joint Pain and Back Pain, can be a little wobbly on feet when first stands up    Neurological: No Problems      Hematological and Lymphatic: No Problems     Endocrine: No Problems         A 10-point review of systems has been conducted and pertinent positives have been   recorded. All other review of systems are negative    Was the patient admitted during the course of treatment OR within 30 days of treatment?   No      Additional Comments:

## 2022-02-06 NOTE — CONSULTS
[facility]           Radiation Oncology      [address] [facility]        [address]        Phil Parham: [phone]        F: [phone]       [email or web address]                   Dr. Ayala Ruvalcaba MD PhD    CONSULT NOTE     Date of Service: 2022  Patient ID: Argelia Lange   : 1945  MRN: [id number]   Acct Number: [de-identified]       2022    Gail Wilson  78 y.o.   1945    REFERRING PROVIDER: Toma Knight    PCP:  Teto Cobian MD    DIAGNOSIS: Local regionally recurrent prostate cancer. STAGING: Cancer Staging  Malignant tumor of prostate [facility])  Staging form: Prostate, AJCC 8th Edition  - Clinical: Stage Unknown (rcTX, cN0, cM0) - Signed by Ayala Ruvalcaba MD on 2022      HISTORY OF PRESENT ILLNESS: Mr. Argelia Lange  is a 78y.o. year old male with history of Shaw's esophagus, coronary artery disease, hypertension, and prostate cancer status post radical prostatectomy in . In  he had a rising PSA and received Lupron with some response and has been on intermittent ADT since that time. His PSA history is as follows:    2020 0.30  12/10/2020 0.11  6/10/2021 0.21  2021 10.53    This rising PSA prompted restaging scans in the form of a CT abdomen pelvis on 2021 which was negative. Bone scan was also within normal limits and did not reveal any overt osseous metastases. The patient discussed options with Dr. Toma Knight with medical oncology and referral to radiation was made for consideration of salvage radiotherapy. The patient did have an [id number] PET scan on 2021 which revealed hypermetabolic activity suspicious for local disease recurrence in the former location of the prostate. There was also foci of increased uptake suspicious for bony metastatic disease in the L3 vertebral body and in the right iliac bone. There was no hypermetabolic kristina activity.     Symptomatically the patient notes some chronic back pain for the past 3 to 4 years and left knee pain for a few years. The pain in his lumbar spine is greater on the right. He does have significant urinary symptoms in the form of incomplete emptying, frequency, intermittency, urgency, and weak stream.  He notes nocturia 2 times nightly. His AUA score is 19. PAST MEDICAL HISTORY:      Diagnosis Date    Cancer [facility])     prostate    Hyperlipidemia     Hypertension        PAST SURGICAL HISTORY:      Procedure Laterality Date    CARDIAC CATHETERIZATION  12/2019    CATARACT REMOVAL WITH IMPLANT Bilateral 06/2013    COLONOSCOPY  04/2016    COLONOSCOPY N/A 4/15/2021    COLORECTAL CANCER SCREENING, HIGH RISK performed by Aminah Abraham MD at [address]  06/1998    CORONARY ANGIOPLASTY WITH STENT PLACEMENT  02/2007    CORONARY ANGIOPLASTY WITH STENT PLACEMENT  01/2009    CORONARY ANGIOPLASTY WITH STENT PLACEMENT  07/2012    CYSTOSCOPY  04/15/2021    HEMORRHOID SURGERY  11/2013    INGUINAL HERNIA REPAIR  05/1990    PROSTATE SURGERY  06/2003    removed    PROSTATE SURGERY  09/2013    biopsy    UPPER GASTROINTESTINAL ENDOSCOPY  09/2018    UPPER GASTROINTESTINAL ENDOSCOPY N/A 4/15/2021    EGD DIAGNOSTIC ONLY performed by Aminah Abraham MD at [address] ENDOSCOPY  04/15/2021    VASECTOMY         Allergies   Allergen Reactions    Iodides Nausea And Vomiting     Contrast Dye       MEDICATIONS:  Medications reviewed and reconciled.   Current Outpatient Medications   Medication Sig Dispense Refill    pantoprazole (PROTONIX) 40 MG tablet TAKE 1 TABLET DAILY 90 tablet 3    lisinopril (PRINIVIL;ZESTRIL) 10 MG tablet TAKE 1 TABLET DAILY 90 tablet 3    psyllium (METAMUCIL) 28 % packet Take 1 packet by mouth 2 times daily Take with full glass of h20 or juice      isosorbide mononitrate (IMDUR) 60 MG extended release tablet Take 2 tablets by mouth daily 30 tablet 3    Magnesium 400 MG CAPS Take 1 capsule by mouth daily (Patient taking differently: Take 1 capsule by mouth nightly ) 90 capsule 4    atenolol (TENORMIN) 50 MG tablet Take 50 mg by mouth nightly       rosuvastatin (CRESTOR) 20 MG tablet Take 20 mg by mouth nightly       aspirin 81 MG tablet Take 81 mg by mouth daily      Cholecalciferol (VITAMIN D3) 1000 units TABS Take by mouth daily       lidocaine (LIDODERM) 5 % APPLY 1 PATCH TO THE SKIN DAILY (12 HOURS ON AND 12 HOURS OFF) 30 patch 2    ketoconazole (NIZORAL) 2 % cream Apply topically twice daily for at least 4 weeks (or for 1 week after lesions have healed). (Patient not taking: Reported on 9/28/2021) 120 g 1    nitroGLYCERIN (NITROSTAT) 0.4 MG SL tablet Place 0.4 mg under the tongue every 5 minutes as needed for Chest pain up to max of 3 total doses. If no relief after 1 dose, call 911. No current facility-administered medications for this encounter. FAMILY HISTORY:  Family History   Problem Relation Age of Onset    Cancer Father         prostate    Heart Disease Father     Cancer Brother         prostate    Heart Disease Brother     Heart Disease Paternal Uncle     Cancer Other     Colon Cancer Neg Hx        SOCIAL HISTORY:       reports that he quit smoking about 10 years ago. His smoking use included cigarettes. He started smoking about 62 years ago. He has a 51.00 pack-year smoking history. He has never used smokeless tobacco..   reports current alcohol use of about 2.0 - 3.0 standard drinks of alcohol per week. .   reports no history of drug use. REVIEW OF SYSTEMS:  Obtained from the patient, chart review and nursing assessment. Review of Systems   All other systems reviewed and are negative.        PHYSICAL EXAMINATION:      Vitals:    01/26/22 0906   BP: (!) 155/101   Pulse: 56   Resp: 20   Temp: 96.5 °F (35.8 °C)   SpO2: 96%   Weight: 197 lb 3.2 oz (89.4 kg)   Height: 5' 4\" (1.626 m)       Wt Readings from Last 3 Encounters:   01/26/22 197 lb 3.2 oz (89.4 kg)   09/28/21 200 lb 6.4 oz (90.9 kg)   09/16/21 202 lb 3.2 oz (91.7 kg)       ECOG PERFORMANCE STATUS:  1    Physical Exam  Vitals and nursing note reviewed. Constitutional:       Appearance: Normal appearance. He is not ill-appearing. Comments: He is present unaccompanied. HENT:      Head: Normocephalic and atraumatic. Eyes:      General: No scleral icterus. Extraocular Movements: Extraocular movements intact. Conjunctiva/sclera: Conjunctivae normal.   Cardiovascular:      Rate and Rhythm: Regular rhythm. Heart sounds: Normal heart sounds. Pulmonary:      Breath sounds: Normal breath sounds. Abdominal:      General: Bowel sounds are normal.      Palpations: Abdomen is soft. Musculoskeletal:         General: Normal range of motion. Cervical back: Normal range of motion. No rigidity. Right lower leg: No edema. Left lower leg: No edema. Lymphadenopathy:      Cervical: No cervical adenopathy. Skin:     General: Skin is warm and dry. Coloration: Skin is not jaundiced. Neurological:      General: No focal deficit present. Mental Status: He is alert. Mental status is at baseline.    Psychiatric:         Mood and Affect: Mood normal.         Behavior: Behavior normal.         RADIATION SAFETY AND ONCOLOGIC TREATMENT SUPPORT:    - Previous radiation history: None   - History of autoimmune or connective tissue disease: None  - Nutritional support/ PEG: Patient will meet with dietitian prior to initiation of therapy  - Dental evaluation: Not applicable  -  requested:   met with the patient, please refer to the note from that encounter.  - Transportation for daily treatment: No issues    TUMOR MARKERS:   Lab Results   Component Value Date    PSA 10.53 12/06/2021       IMAGING REVIEWED: Per HPI    PATHOLOGY REVIEWED: Per HPI    IMPRESSION: This is a 79-year-old gentleman with a history of prostate cancer status post radical prostatectomy in 2003 who was noted to have a rising PSA in 2016 and was started on Lupron intermittently under the care of medical oncology with his last dose in 6/2020. He did have a response to this and his PSA however most recent PSA in December 2021 revealed a spike to 10.53 concerning for progression of biochemically recurrent prostate cancer. Restaging scans with bone scan and CT abdomen pelvis did not reveal any overt metastatic disease however Axumin PET CT scan revealed activity within the post prostatectomy bed concerning for local regional recurrence. He is dispositioned to receive salvage radiation therapy to the post prostatectomy bed and regional lymph nodes at risk. DISCUSSION/PLAN:   I reviewed the risk, benefits, and rationale of salvage radiation therapy in this setting. I discussed the sensitivity of his PET/CT Axumin scan if delineated the exact location of his biochemically recurrent and gross recurrent disease within the prostatectomy bed. I did also discuss a questionable activity in L3 and in the pelvis and indicated that I would review this with radiology at [facility], as I was not particularly impressed with the imaging after reviewing this myself and felt that there was no overt metastases in this area. Furthermore given negative bone scan I am somewhat confident that the area of disease recurrence is solely within the post prostatectomy bed. I reviewed the potential side effects of radiation therapy in this setting which include but are not limited to fatigue, dermatitis of the skin at the beam entry and exit points, potential for nausea, diarrhea, and worsening urinary symptoms. I did discuss that the patient should be on androgen deprivation therapy for a longer period of time rather than on an intermittent schedule and will coordinate with medical oncology to initiate this.   Once ADT is reinitiated I will have the patient return for simulation and salvage radiation therapy to the post prostatectomy bed and local regional lymph nodes. In the interim he knows that we remain available should he have any additional questions or concerns regarding above plan. Thank you very much for the referral and for the opportunity provide care for this patient. A combined total of 60 minutes was spent in preparing this consultation as well as in meeting with the patient and her  in person. Please excuse any typos in this consultation note as voice dictation was used. ADDENDUM: I reviewed the Axumin PET/CT results with radiology at [facility] and he also felt that the index of suspicion of metastatic disease within the L3 vertebral body and pelvis was somewhat low. I will therefore move forward with the plan of salvage radiation therapy as discussed above. I will apprise the patient of this discussion with radiology when I see him for simulation in February.     Gudelia Franco MD PHD  Radiation Oncologist, [address] [place] [address] Director    Department of [address] [place] [address]  [address]

## 2022-02-08 PROCEDURE — 77263 THER RADIOLOGY TX PLNG CPLX: CPT | Performed by: RADIOLOGY

## 2022-02-09 ENCOUNTER — HOSPITAL ENCOUNTER (OUTPATIENT)
Dept: RADIATION ONCOLOGY | Age: 77
Discharge: HOME | End: 2022-02-09
Attending: RADIOLOGY
Payer: MEDICARE

## 2022-02-09 PROCEDURE — 77334 RADIATION TREATMENT AID(S): CPT | Performed by: RADIOLOGY

## 2022-02-09 PROCEDURE — 77399 UNLISTED PX MED RADJ PHYSICS: CPT | Performed by: RADIOLOGY

## 2022-02-09 NOTE — PROGRESS NOTES
Teaching & Instructions - Prostate  General  Simulation  Initial Treatment  Self-Care Info  Nutrition  Social Service    Site-Specific  Side Effects  Cystitis Mgmt  Bowel Mgmt  Perineal Care  Proctitis Mgmt  Sexuality Issues    Other/Prevention  Smoking Cessation    Educational Handouts  Radiation Therapy & You  Site Specific Instructions  Radiation Oncology Dept Information  CBMS Program      Patient eager to learn, verbalized understanding of verbal education and handouts. RADIATION THERAPY BARRIERS ASSESSMENT      During your CT Simulation, you were placed in the position that you be in for your radiation treatments. You will be will in this position for approximately 10-15 minutes, do you have any concerns about staying in this position for the required time? No        I see that you have a history of    prostatectomy   ,are you working closely with your primary care doctor or a specialist in the management of this diagnosis? No        Do you anticipate that the symptoms of this diagnosis will negatively impact your ability to complete your radiation therapy course? No        While you are on treatment, you will be evaluated by the Radiation Oncologist once a week on Thursdays. The focus of these appointments will be management of side effects. You will be here for a minimum of one hour or more depending on your specific needs. Do you have any concerns about your ability to keep this appointment? No      You will be coming to the [facility] for  36   daily treatments, do you have any concerns about transportation? No        Do you have any financial concerns about your treatment that you would like to further discuss with social work? No      Do you have any other concerns or for see any concerns or potential barriers to completing your radiation therapy that you want us to be aware of?  No

## 2022-02-14 PROCEDURE — 77300 RADIATION THERAPY DOSE PLAN: CPT | Performed by: RADIOLOGY

## 2022-02-14 PROCEDURE — 77338 DESIGN MLC DEVICE FOR IMRT: CPT | Performed by: RADIOLOGY

## 2022-02-14 PROCEDURE — 77301 RADIOTHERAPY DOSE PLAN IMRT: CPT | Performed by: RADIOLOGY

## 2022-02-16 ENCOUNTER — HOSPITAL ENCOUNTER (OUTPATIENT)
Dept: RADIATION ONCOLOGY | Age: 77
Discharge: HOME | End: 2022-02-16
Attending: RADIOLOGY
Payer: MEDICARE

## 2022-02-16 PROCEDURE — 77014 PR CT GUIDANCE PLACEMENT RAD THERAPY FIELDS: CPT | Performed by: RADIOLOGY

## 2022-02-16 PROCEDURE — 77385 HC NTSTY MODUL RAD TX DLVR SMPL: CPT | Performed by: RADIOLOGY

## 2022-02-17 ENCOUNTER — HOSPITAL ENCOUNTER (OUTPATIENT)
Dept: RADIATION ONCOLOGY | Age: 77
Discharge: HOME | End: 2022-02-17
Attending: RADIOLOGY
Payer: MEDICARE

## 2022-02-17 VITALS
BODY MASS INDEX: 33.81 KG/M2 | HEART RATE: 58 BPM | OXYGEN SATURATION: 97 % | SYSTOLIC BLOOD PRESSURE: 166 MMHG | DIASTOLIC BLOOD PRESSURE: 77 MMHG | WEIGHT: 197 LBS | RESPIRATION RATE: 20 BRPM | TEMPERATURE: 97.1 F

## 2022-02-17 PROCEDURE — 77014 PR CT GUIDANCE PLACEMENT RAD THERAPY FIELDS: CPT | Performed by: RADIOLOGY

## 2022-02-17 PROCEDURE — 77385 HC NTSTY MODUL RAD TX DLVR SMPL: CPT | Performed by: RADIOLOGY

## 2022-02-17 PROCEDURE — 77336 RADIATION PHYSICS CONSULT: CPT | Performed by: RADIOLOGY

## 2022-02-17 NOTE — PROGRESS NOTES
17 [facility]           Radiation Oncology      [address] [facility]        [place]        Jane Latin: [phone]        F: [phone]       [employer]                   Dr. Sloane Magana MD PhD    ON TREATMENT VISIT (OTV) NOTE     Date of Service: 2022  Patient ID: Lily Prabhakar   : 1945  MRN: [id number]   Acct Number: [de-identified]       DIAGNOSIS:  Cancer Staging  Malignant tumor of prostate [facility])  Staging form: Prostate, AJCC 8th Edition  - Clinical: Stage Unknown (rcTX, cN0, cM0) - Signed by Sloane Magana MD on 2022      Treatment Area: Pelvis- Male    Current Total Dose(cGy): 400  Current Fraction: 2    Patient was seen today for weekly visit. Wt Readings from Last 3 Encounters:   22 197 lb (89.4 kg)   22 197 lb 3.2 oz (89.4 kg)   21 200 lb 6.4 oz (90.9 kg)       BP (!) 166/77   Pulse 58   Temp 97.1 °F (36.2 °C)   Resp 20   Wt 197 lb (89.4 kg)   SpO2 97%   BMI 33.81 kg/m²     Lab Results   Component Value Date    WBC 4.0 (L) 2021     (L) 2021       Comfort Alteration  Fatigue:None, able to perform daily activities    Pain Location:   Pain Intensity (Current): 0 No Pain  Pain Treatment: No treatment scheduled  Pain Relief: No relief    Emotional Alteration:   Coping: effective    Nutritional Alteration  Anorexia: none   Nausea: No nausea noted  Vomiting: No vomiting     Skin Alteration   Skin reaction: No changes noted    Elimination Alterations  Urinary Frequency: None  Urinary Retention: Hesitancy or dribbling, retention during post op period  Urinary Incontinence: None  Dysuria: None- but some slow to start and weak stream  Nocturia: 1-3 times nightly  Proctitis: None  Diarrhea: None  Comments: he changes his metamucil to after treatment- he takes it because he doesn't get enough fiber- he never feels like his bowels are empty since hemorrhoid surgery- today he had to get off the table due to gas.   We discussed gas-x. Additional Comments:     MEDICATIONS:     Current Outpatient Medications   Medication Sig Dispense Refill    lidocaine (LIDODERM) 5 % APPLY 1 PATCH TO THE SKIN DAILY (12 HOURS ON AND 12 HOURS OFF) 30 patch 2    ketoconazole (NIZORAL) 2 % cream Apply topically twice daily for at least 4 weeks (or for 1 week after lesions have healed). (Patient not taking: Reported on 2021) 120 g 1    pantoprazole (PROTONIX) 40 MG tablet TAKE 1 TABLET DAILY 90 tablet 3    lisinopril (PRINIVIL;ZESTRIL) 10 MG tablet TAKE 1 TABLET DAILY 90 tablet 3    psyllium (METAMUCIL) 28 % packet Take 1 packet by mouth 2 times daily Take with full glass of h20 or juice      isosorbide mononitrate (IMDUR) 60 MG extended release tablet Take 2 tablets by mouth daily 30 tablet 3    Magnesium 400 MG CAPS Take 1 capsule by mouth daily (Patient taking differently: Take 1 capsule by mouth nightly ) 90 capsule 4    atenolol (TENORMIN) 50 MG tablet Take 50 mg by mouth nightly       rosuvastatin (CRESTOR) 20 MG tablet Take 20 mg by mouth nightly       nitroGLYCERIN (NITROSTAT) 0.4 MG SL tablet Place 0.4 mg under the tongue every 5 minutes as needed for Chest pain up to max of 3 total doses. If no relief after 1 dose, call 911.  aspirin 81 MG tablet Take 81 mg by mouth daily      Cholecalciferol (VITAMIN D3) 1000 units TABS Take by mouth daily        No current facility-administered medications for this encounter. * New    PHYSICAL EXAM:       ECO - Symptomatic but completely ambulatory (Restricted in physically strenuous activity but ambulatory and able to carry out work of a light or sedentary nature. For example, light housework, office work)     General: NAD, AO x 3, Mentation is clear with appropriate affect. HEENT: Normocephalic, atraumatic  Thorax:  Unlabored  Abdomen:  Non-distended    Chemotherapy Update: Concurrent ADT    Treatment Imaging: CBCT;    ASSESSMENT: Minimal radiation side effects.  Responding appropriately to symptomatic management. New medications, diagnostic results: Continue treatment as planned    PLAN: Again reviewed potential side effects of radiation for the patient's treatment. Continue local/topical care. Recommended that patient stop metamucil given frequent small stools and issues with gas on treatment scans and start Gas-X prophylactically daily prior to each radiation treatment. Continue current radiation course as prescribed.

## 2022-02-18 ENCOUNTER — HOSPITAL ENCOUNTER (OUTPATIENT)
Dept: RADIATION ONCOLOGY | Age: 77
Discharge: HOME | End: 2022-02-18
Attending: RADIOLOGY
Payer: MEDICARE

## 2022-02-18 PROCEDURE — 77385 HC NTSTY MODUL RAD TX DLVR SMPL: CPT | Performed by: RADIOLOGY

## 2022-02-18 PROCEDURE — 77014 PR CT GUIDANCE PLACEMENT RAD THERAPY FIELDS: CPT | Performed by: RADIOLOGY

## 2022-02-21 ENCOUNTER — HOSPITAL ENCOUNTER (OUTPATIENT)
Dept: RADIATION ONCOLOGY | Age: 77
Discharge: HOME | End: 2022-02-21
Attending: RADIOLOGY
Payer: MEDICARE

## 2022-02-21 PROCEDURE — 77014 PR CT GUIDANCE PLACEMENT RAD THERAPY FIELDS: CPT | Performed by: RADIOLOGY

## 2022-02-21 PROCEDURE — 77385 HC NTSTY MODUL RAD TX DLVR SMPL: CPT | Performed by: RADIOLOGY

## 2022-02-22 ENCOUNTER — HOSPITAL ENCOUNTER (OUTPATIENT)
Dept: RADIATION ONCOLOGY | Age: 77
Discharge: HOME | End: 2022-02-22
Attending: RADIOLOGY
Payer: MEDICARE

## 2022-02-22 PROCEDURE — 77385 HC NTSTY MODUL RAD TX DLVR SMPL: CPT | Performed by: RADIOLOGY

## 2022-02-22 PROCEDURE — 77014 PR CT GUIDANCE PLACEMENT RAD THERAPY FIELDS: CPT | Performed by: RADIOLOGY

## 2022-02-22 PROCEDURE — 77427 RADIATION TX MANAGEMENT X5: CPT | Performed by: RADIOLOGY

## 2022-02-23 ENCOUNTER — HOSPITAL ENCOUNTER (OUTPATIENT)
Dept: RADIATION ONCOLOGY | Age: 77
Discharge: HOME | End: 2022-02-23
Attending: RADIOLOGY
Payer: MEDICARE

## 2022-02-23 PROCEDURE — 77385 HC NTSTY MODUL RAD TX DLVR SMPL: CPT | Performed by: RADIOLOGY

## 2022-02-23 PROCEDURE — 77014 PR CT GUIDANCE PLACEMENT RAD THERAPY FIELDS: CPT | Performed by: RADIOLOGY

## 2022-02-23 PROCEDURE — 77336 RADIATION PHYSICS CONSULT: CPT | Performed by: RADIOLOGY

## 2022-02-24 ENCOUNTER — HOSPITAL ENCOUNTER (OUTPATIENT)
Dept: RADIATION ONCOLOGY | Age: 77
Discharge: HOME | End: 2022-02-24
Attending: RADIOLOGY
Payer: MEDICARE

## 2022-02-24 VITALS
WEIGHT: 197.8 LBS | HEART RATE: 55 BPM | DIASTOLIC BLOOD PRESSURE: 75 MMHG | OXYGEN SATURATION: 98 % | TEMPERATURE: 96.8 F | SYSTOLIC BLOOD PRESSURE: 145 MMHG | RESPIRATION RATE: 18 BRPM | BODY MASS INDEX: 33.95 KG/M2

## 2022-02-24 PROCEDURE — 77014 PR CT GUIDANCE PLACEMENT RAD THERAPY FIELDS: CPT | Performed by: RADIOLOGY

## 2022-02-24 PROCEDURE — 77385 HC NTSTY MODUL RAD TX DLVR SMPL: CPT | Performed by: RADIOLOGY

## 2022-02-24 NOTE — PROGRESS NOTES
17 [facility]           Radiation Oncology      [address] [facility]        Andrea Booze: [phone]        F: [phone]       [employer]                   Dr. Jaylon Holland MD PhD    ON TREATMENT VISIT (OTV) NOTE     Date of Service: 2022  Patient ID: Felecia Rushing   : 1945  MRN: [id number]   Acct Number: [de-identified]       DIAGNOSIS:  Cancer Staging  Malignant tumor of prostate [facility])  Staging form: Prostate, AJCC 8th Edition  - Clinical: Stage Unknown (rcTX, cN0, cM0) - Signed by Jaylon Holland MD on 2022      Treatment Area: Pelvis- Male    Current Total Dose(cGy): 1400  Current Fraction: 7    Patient was seen today for weekly visit.      Wt Readings from Last 3 Encounters:   22 197 lb 12.8 oz (89.7 kg)   22 197 lb (89.4 kg)   22 197 lb 3.2 oz (89.4 kg)       BP (!) 145/75   Pulse 55   Temp 96.8 °F (36 °C)   Resp 18   Wt 197 lb 12.8 oz (89.7 kg)   SpO2 98%   BMI 33.95 kg/m²     Lab Results   Component Value Date    WBC 4.0 (L) 2021     (L) 2021       Comfort Alteration  Fatigue:None, able to perform daily activities    Pain Location: denies      Emotional Alteration:   Coping: effective    Nutritional Alteration  Anorexia: none   Nausea: No nausea noted  Vomiting: No vomiting     Skin Alteration   Skin reaction: No changes noted    Elimination Alterations  Urinary Frequency: Urinating less than once an hour  Urinary Retention: Hesitancy or dribbling, retention during post op period  Urinary Incontinence: None  Dysuria: None  Nocturia: 1-3 times nightly  Proctitis: None  Diarrhea: None  Comments: no gas issues today-altered diet a bit        MEDICATIONS:     Current Outpatient Medications   Medication Sig Dispense Refill    lidocaine (LIDODERM) 5 % APPLY 1 PATCH TO THE SKIN DAILY (12 HOURS ON AND 12 HOURS OFF) 30 patch 2    ketoconazole (NIZORAL) 2 % cream Apply topically twice daily for at least 4 weeks (or for 1 week after lesions have healed). (Patient not taking: Reported on 2021) 120 g 1    pantoprazole (PROTONIX) 40 MG tablet TAKE 1 TABLET DAILY 90 tablet 3    lisinopril (PRINIVIL;ZESTRIL) 10 MG tablet TAKE 1 TABLET DAILY 90 tablet 3    psyllium (METAMUCIL) 28 % packet Take 1 packet by mouth 2 times daily Take with full glass of h20 or juice      isosorbide mononitrate (IMDUR) 60 MG extended release tablet Take 2 tablets by mouth daily 30 tablet 3    Magnesium 400 MG CAPS Take 1 capsule by mouth daily (Patient taking differently: Take 1 capsule by mouth nightly ) 90 capsule 4    atenolol (TENORMIN) 50 MG tablet Take 50 mg by mouth nightly       rosuvastatin (CRESTOR) 20 MG tablet Take 20 mg by mouth nightly       nitroGLYCERIN (NITROSTAT) 0.4 MG SL tablet Place 0.4 mg under the tongue every 5 minutes as needed for Chest pain up to max of 3 total doses. If no relief after 1 dose, call 911.  aspirin 81 MG tablet Take 81 mg by mouth daily      Cholecalciferol (VITAMIN D3) 1000 units TABS Take by mouth daily        No current facility-administered medications for this encounter. * New    PHYSICAL EXAM:       ECO - Symptomatic but completely ambulatory (Restricted in physically strenuous activity but ambulatory and able to carry out work of a light or sedentary nature. For example, light housework, office work)     General: NAD, AO x 3, Mentation is clear with appropriate affect. HEENT: Normocephalic, atraumatic  Thorax:  Unlabored  Abdomen:  Non-distended    Chemotherapy Update: Concurrent ADT    Treatment Imaging: CBCT;    ASSESSMENT: Minimal radiation side effects. Responding appropriately to symptomatic management. New medications, diagnostic results: Continue treatment as planned    PLAN: Again reviewed potential side effects of radiation for the patient's treatment. Continue local/topical care. Continue current radiation course as prescribed.

## 2022-02-25 ENCOUNTER — HOSPITAL ENCOUNTER (OUTPATIENT)
Dept: RADIATION ONCOLOGY | Age: 77
Discharge: HOME | End: 2022-02-25
Attending: RADIOLOGY
Payer: MEDICARE

## 2022-02-25 PROCEDURE — 77385 HC NTSTY MODUL RAD TX DLVR SMPL: CPT | Performed by: RADIOLOGY

## 2022-02-25 PROCEDURE — 77014 PR CT GUIDANCE PLACEMENT RAD THERAPY FIELDS: CPT | Performed by: RADIOLOGY

## 2022-02-28 ENCOUNTER — HOSPITAL ENCOUNTER (OUTPATIENT)
Dept: RADIATION ONCOLOGY | Age: 77
Discharge: HOME | End: 2022-02-28
Attending: RADIOLOGY
Payer: MEDICARE

## 2022-02-28 PROCEDURE — 77014 PR CT GUIDANCE PLACEMENT RAD THERAPY FIELDS: CPT | Performed by: RADIOLOGY

## 2022-02-28 PROCEDURE — 77385 HC NTSTY MODUL RAD TX DLVR SMPL: CPT | Performed by: RADIOLOGY

## 2022-03-01 ENCOUNTER — HOSPITAL ENCOUNTER (OUTPATIENT)
Dept: RADIATION ONCOLOGY | Age: 77
Discharge: HOME | End: 2022-03-01
Attending: RADIOLOGY
Payer: MEDICARE

## 2022-03-01 PROCEDURE — 77427 RADIATION TX MANAGEMENT X5: CPT | Performed by: RADIOLOGY

## 2022-03-01 PROCEDURE — 77014 PR CT GUIDANCE PLACEMENT RAD THERAPY FIELDS: CPT | Performed by: RADIOLOGY

## 2022-03-01 PROCEDURE — 77385 HC NTSTY MODUL RAD TX DLVR SMPL: CPT | Performed by: RADIOLOGY

## 2022-03-02 ENCOUNTER — HOSPITAL ENCOUNTER (OUTPATIENT)
Dept: RADIATION ONCOLOGY | Age: 77
Discharge: HOME | End: 2022-03-02
Attending: RADIOLOGY
Payer: MEDICARE

## 2022-03-02 PROCEDURE — 77385 HC NTSTY MODUL RAD TX DLVR SMPL: CPT | Performed by: RADIOLOGY

## 2022-03-02 PROCEDURE — 77014 PR CT GUIDANCE PLACEMENT RAD THERAPY FIELDS: CPT | Performed by: RADIOLOGY

## 2022-03-03 ENCOUNTER — HOSPITAL ENCOUNTER (OUTPATIENT)
Dept: RADIATION ONCOLOGY | Age: 77
Discharge: HOME | End: 2022-03-03
Attending: RADIOLOGY
Payer: MEDICARE

## 2022-03-03 VITALS
OXYGEN SATURATION: 98 % | HEART RATE: 62 BPM | BODY MASS INDEX: 33.57 KG/M2 | TEMPERATURE: 96.9 F | RESPIRATION RATE: 18 BRPM | WEIGHT: 195.6 LBS | DIASTOLIC BLOOD PRESSURE: 74 MMHG | SYSTOLIC BLOOD PRESSURE: 137 MMHG

## 2022-03-03 PROCEDURE — 77385 HC NTSTY MODUL RAD TX DLVR SMPL: CPT | Performed by: RADIOLOGY

## 2022-03-03 PROCEDURE — 77014 PR CT GUIDANCE PLACEMENT RAD THERAPY FIELDS: CPT | Performed by: RADIOLOGY

## 2022-03-03 PROCEDURE — 77336 RADIATION PHYSICS CONSULT: CPT | Performed by: RADIOLOGY

## 2022-03-03 NOTE — PROGRESS NOTES
17 [facility]           Radiation Oncology      [address] [facility]        [place] [address]        Reynaldo Floresgo: [phone]        F: [phone]       [email or web address]                   Dr. Fabrice Lee MD PhD    ON TREATMENT VISIT (OTV) NOTE     Date of Service: 3/3/2022  Patient ID: Raheem Malin   : 1945  MRN: [id number]   Acct Number: [de-identified]       DIAGNOSIS:  Cancer Staging  Malignant tumor of prostate [facility])  Staging form: Prostate, AJCC 8th Edition  - Clinical: Stage Unknown (rcTX, cN0, cM0) - Signed by Fabrice Lee MD on 2022      Treatment Area: Pelvis- Male    Current Total Dose(cGy): 2400  Current Fraction: 12    Patient was seen today for weekly visit.      Wt Readings from Last 3 Encounters:   22 195 lb 9.6 oz (88.7 kg)   22 197 lb 12.8 oz (89.7 kg)   22 197 lb (89.4 kg)       /74   Pulse 62   Temp 96.9 °F (36.1 °C)   Resp 18   Wt 195 lb 9.6 oz (88.7 kg)   SpO2 98%   BMI 33.57 kg/m²     Lab Results   Component Value Date    WBC 4.0 (L) 2021     (L) 2021       Comfort Alteration  Fatigue:None, able to perform daily activities    Pain Location: denies all pain      Emotional Alteration:   Coping: effective    Nutritional Alteration  Anorexia: none -states appetite slightly decreased  Nausea: No nausea noted  Vomiting: No vomiting     Skin Alteration   Skin reaction: No changes noted    Elimination Alterations  Urinary Frequency: Urinating less than once an hour  Urinary Retention: some dribbling after finishing-not completely done  Urinary Incontinence: None  Dysuria: None  Nocturia: 1-3 times nightly  Proctitis: None  Diarrhea: None  Comments:     Additional Comments:     MEDICATIONS:     Current Outpatient Medications   Medication Sig Dispense Refill    lidocaine (LIDODERM) 5 % APPLY 1 PATCH TO THE SKIN DAILY (12 HOURS ON AND 12 HOURS OFF) 30 patch 2    ketoconazole (NIZORAL) 2 % cream Apply topically twice daily for at least 4 weeks (or for 1 week after lesions have healed). (Patient not taking: Reported on 2021) 120 g 1    pantoprazole (PROTONIX) 40 MG tablet TAKE 1 TABLET DAILY 90 tablet 3    lisinopril (PRINIVIL;ZESTRIL) 10 MG tablet TAKE 1 TABLET DAILY 90 tablet 3    psyllium (METAMUCIL) 28 % packet Take 1 packet by mouth 2 times daily Take with full glass of h20 or juice      isosorbide mononitrate (IMDUR) 60 MG extended release tablet Take 2 tablets by mouth daily 30 tablet 3    Magnesium 400 MG CAPS Take 1 capsule by mouth daily (Patient taking differently: Take 1 capsule by mouth nightly ) 90 capsule 4    atenolol (TENORMIN) 50 MG tablet Take 50 mg by mouth nightly       rosuvastatin (CRESTOR) 20 MG tablet Take 20 mg by mouth nightly       nitroGLYCERIN (NITROSTAT) 0.4 MG SL tablet Place 0.4 mg under the tongue every 5 minutes as needed for Chest pain up to max of 3 total doses. If no relief after 1 dose, call 911.  aspirin 81 MG tablet Take 81 mg by mouth daily      Cholecalciferol (VITAMIN D3) 1000 units TABS Take by mouth daily        No current facility-administered medications for this encounter. * New    PHYSICAL EXAM:       ECO - Symptomatic but completely ambulatory (Restricted in physically strenuous activity but ambulatory and able to carry out work of a light or sedentary nature. For example, light housework, office work)     General: NAD, AO x 3, Mentation is clear with appropriate affect. HEENT: Normocephalic, atraumatic  Thorax:  Unlabored  Abdomen:  Non-distended    Chemotherapy Update: Concurrent ADT    Treatment Imaging: CBCT;    ASSESSMENT: Minimal radiation side effects. Responding appropriately to symptomatic management. New medications, diagnostic results: Continue treatment as planned    PLAN: Again reviewed potential side effects of radiation for the patient's treatment. Continue local/topical care.  Will continue to monitor urinary symptoms for now, patient wishes to minimize starting new medications unless the symptoms become bothersome at which point he would start using them. Continue current radiation course as prescribed.

## 2022-03-04 ENCOUNTER — HOSPITAL ENCOUNTER (OUTPATIENT)
Dept: RADIATION ONCOLOGY | Age: 77
Discharge: HOME | End: 2022-03-04
Attending: RADIOLOGY
Payer: MEDICARE

## 2022-03-04 PROCEDURE — 77014 PR CT GUIDANCE PLACEMENT RAD THERAPY FIELDS: CPT | Performed by: RADIOLOGY

## 2022-03-04 PROCEDURE — 77385 HC NTSTY MODUL RAD TX DLVR SMPL: CPT | Performed by: RADIOLOGY

## 2022-03-07 ENCOUNTER — HOSPITAL ENCOUNTER (OUTPATIENT)
Dept: RADIATION ONCOLOGY | Age: 77
Discharge: HOME | End: 2022-03-07
Attending: RADIOLOGY
Payer: MEDICARE

## 2022-03-07 PROCEDURE — 77014 PR CT GUIDANCE PLACEMENT RAD THERAPY FIELDS: CPT | Performed by: RADIOLOGY

## 2022-03-07 PROCEDURE — 77385 HC NTSTY MODUL RAD TX DLVR SMPL: CPT | Performed by: RADIOLOGY

## 2022-03-08 ENCOUNTER — HOSPITAL ENCOUNTER (OUTPATIENT)
Dept: RADIATION ONCOLOGY | Age: 77
Discharge: HOME | End: 2022-03-08
Attending: RADIOLOGY
Payer: MEDICARE

## 2022-03-08 PROCEDURE — 77336 RADIATION PHYSICS CONSULT: CPT | Performed by: RADIOLOGY

## 2022-03-08 PROCEDURE — 77385 HC NTSTY MODUL RAD TX DLVR SMPL: CPT | Performed by: RADIOLOGY

## 2022-03-08 PROCEDURE — 77427 RADIATION TX MANAGEMENT X5: CPT | Performed by: RADIOLOGY

## 2022-03-08 PROCEDURE — 77014 PR CT GUIDANCE PLACEMENT RAD THERAPY FIELDS: CPT | Performed by: RADIOLOGY

## 2022-03-09 ENCOUNTER — HOSPITAL ENCOUNTER (OUTPATIENT)
Dept: RADIATION ONCOLOGY | Age: 77
Discharge: HOME | End: 2022-03-09
Attending: RADIOLOGY
Payer: MEDICARE

## 2022-03-09 PROCEDURE — 77014 PR CT GUIDANCE PLACEMENT RAD THERAPY FIELDS: CPT | Performed by: RADIOLOGY

## 2022-03-09 PROCEDURE — 77385 HC NTSTY MODUL RAD TX DLVR SMPL: CPT | Performed by: RADIOLOGY

## 2022-03-10 ENCOUNTER — HOSPITAL ENCOUNTER (OUTPATIENT)
Dept: RADIATION ONCOLOGY | Age: 77
Discharge: HOME | End: 2022-03-10
Attending: RADIOLOGY
Payer: MEDICARE

## 2022-03-10 VITALS
OXYGEN SATURATION: 97 % | BODY MASS INDEX: 33.51 KG/M2 | RESPIRATION RATE: 16 BRPM | DIASTOLIC BLOOD PRESSURE: 86 MMHG | WEIGHT: 195.2 LBS | TEMPERATURE: 96.7 F | HEART RATE: 49 BPM | SYSTOLIC BLOOD PRESSURE: 122 MMHG

## 2022-03-10 PROCEDURE — 77385 HC NTSTY MODUL RAD TX DLVR SMPL: CPT | Performed by: RADIOLOGY

## 2022-03-10 PROCEDURE — 77014 PR CT GUIDANCE PLACEMENT RAD THERAPY FIELDS: CPT | Performed by: RADIOLOGY

## 2022-03-10 NOTE — PROGRESS NOTES
17 [facility]           Radiation Oncology      [address] [facility]        [address] [address]        [place]: [phone]        F: [phone]       [email or web address]                   Dr. Francis Good MD PhD    ON TREATMENT VISIT (OTV) NOTE     Date of Service: 3/10/2022  Patient ID: Nhi Murillo   : 1945  MRN: [id number]   Acct Number: [de-identified]       DIAGNOSIS:  Cancer Staging  Malignant tumor of prostate [facility])  Staging form: Prostate, AJCC 8th Edition  - Clinical: Stage Unknown (rcTX, cN0, cM0) - Signed by Francis Good MD on 2022      Treatment Area: Pelvis- Male    Current Total Dose(cGy): 3400  Current Fraction: 17    Patient was seen today for weekly visit. Wt Readings from Last 3 Encounters:   03/10/22 195 lb 3.2 oz (88.5 kg)   22 195 lb 9.6 oz (88.7 kg)   22 197 lb 12.8 oz (89.7 kg)       /86   Pulse (!) 49   Temp 96.7 °F (35.9 °C)   Resp 16   Wt 195 lb 3.2 oz (88.5 kg)   SpO2 97%   BMI 33.51 kg/m²     Lab Results   Component Value Date    WBC 4.0 (L) 2021     (L) 2021       Comfort Alteration  Fatigue:None, able to perform daily activities    Pain Location:  Pain Intensity (Current): 0 No Pain  Pain Treatment: No treatment scheduled  Pain Relief: n/a    Emotional Alteration:   Coping: effective    Nutritional Alteration  Anorexia: Loss of appetite but able to eat normal portions of food  Nausea: No nausea noted  Vomiting: No vomiting     Skin Alteration   Skin reaction: No changes noted    Elimination Alterations  Urinary Frequency: Urinating less than once an hour  Urinary Retention: Hesitancy or dribbling, retention during post op period  Urinary Incontinence: None  Dysuria: None  Nocturia: 1-3 times nightly, three last night  Proctitis: None  Diarrhea: soft mushy stool yesterday, bowels moved 3-4 times yesterday am but this is not unusual since his hemorrhoid surgery  Comments: . Additional Comments: Zhou Burnett     MEDICATIONS: Current Outpatient Medications   Medication Sig Dispense Refill    pantoprazole (PROTONIX) 40 MG tablet TAKE 1 TABLET DAILY 90 tablet 3    lisinopril (PRINIVIL;ZESTRIL) 10 MG tablet TAKE 1 TABLET DAILY 90 tablet 3    psyllium (METAMUCIL) 28 % packet Take 1 packet by mouth 2 times daily Take with full glass of h20 or juice      isosorbide mononitrate (IMDUR) 60 MG extended release tablet Take 2 tablets by mouth daily 30 tablet 3    Magnesium 400 MG CAPS Take 1 capsule by mouth daily (Patient taking differently: Take 1 capsule by mouth nightly ) 90 capsule 4    atenolol (TENORMIN) 50 MG tablet Take 50 mg by mouth nightly       rosuvastatin (CRESTOR) 20 MG tablet Take 20 mg by mouth nightly       aspirin 81 MG tablet Take 81 mg by mouth daily      Cholecalciferol (VITAMIN D3) 1000 units TABS Take by mouth daily       nitroGLYCERIN (NITROSTAT) 0.4 MG SL tablet Place 0.4 mg under the tongue every 5 minutes as needed for Chest pain up to max of 3 total doses. If no relief after 1 dose, call 911. No current facility-administered medications for this encounter. * New    PHYSICAL EXAM:       ECO - Symptomatic but completely ambulatory (Restricted in physically strenuous activity but ambulatory and able to carry out work of a light or sedentary nature. For example, light housework, office work)     General: NAD, AO x 3, Mentation is clear with appropriate affect. HEENT: Normocephalic, atraumatic  Thorax:  Unlabored  Abdomen:  Non-distended    Chemotherapy Update: Concurrent ADT    Treatment Imaging: CBCT;    ASSESSMENT: Minimal radiation side effects. Responding appropriately to symptomatic management. New medications, diagnostic results: Continue treatment as planned    PLAN: Again reviewed potential side effects of radiation for the patient's treatment. Continue local/topical care. Continue current radiation course as prescribed.

## 2022-03-11 ENCOUNTER — HOSPITAL ENCOUNTER (OUTPATIENT)
Dept: RADIATION ONCOLOGY | Age: 77
Discharge: HOME | End: 2022-03-11
Attending: RADIOLOGY
Payer: MEDICARE

## 2022-03-11 PROCEDURE — 77014 PR CT GUIDANCE PLACEMENT RAD THERAPY FIELDS: CPT | Performed by: RADIOLOGY

## 2022-03-11 PROCEDURE — 77385 HC NTSTY MODUL RAD TX DLVR SMPL: CPT | Performed by: RADIOLOGY

## 2022-03-14 ENCOUNTER — HOSPITAL ENCOUNTER (OUTPATIENT)
Dept: RADIATION ONCOLOGY | Age: 77
Discharge: HOME | End: 2022-03-14
Attending: RADIOLOGY
Payer: MEDICARE

## 2022-03-14 PROCEDURE — 77385 HC NTSTY MODUL RAD TX DLVR SMPL: CPT | Performed by: RADIOLOGY

## 2022-03-14 PROCEDURE — 77014 PR CT GUIDANCE PLACEMENT RAD THERAPY FIELDS: CPT | Performed by: RADIOLOGY

## 2022-03-14 NOTE — PROGRESS NOTES
Helen Javier   [id number]  1945   Patient Mid-Point Distress Screening Form   Please select the number that describes how much distress you have experiened in the past week (0-10): 4    Please select the number that descrbes how much distress you have experienced today (0-10): 4    If you responded with a score of 6 or above to the first tow questions; please address the following concerns:    Practical Concerns 0-10 Comment        Work, Concerns about Job          Finances, Bills, Insurance          Housing          Transportation          Availability of Caregiver     Family Concerns          Dealing with Children          Dealing with Partner          Ability to have [address] Issues     Emotional Concerns          Sadness, Depression          Anxiety, Worry, Fear          Concerns about Appearance          Loss of Interest in Usual Activities     Spiritual Concerns          Connection to a higher power          Sense of meaning and purpose          Support for my spiritual community     Physical Concerns          Nausea          Eating, Loss of Appetite          Pain          Fatigue       Other Concerns/Notes:Pt states much less distress after getting into the routine of Radiation Treatment schedule. He states the \"4\" is related to daily visits and occasional \"gas\" issues during treatment that creates mild apprehension. He states has no current SW needs, issues or concerns; continues to look forward to treatment being finished, then no longer has to contend with current high fuel prices.     Date of visit: 3/14/2022 10:03 AM      : MANDY Ren[place]

## 2022-03-15 ENCOUNTER — HOSPITAL ENCOUNTER (OUTPATIENT)
Dept: RADIATION ONCOLOGY | Age: 77
Discharge: HOME | End: 2022-03-15
Attending: RADIOLOGY
Payer: MEDICARE

## 2022-03-15 PROCEDURE — 77427 RADIATION TX MANAGEMENT X5: CPT | Performed by: RADIOLOGY

## 2022-03-15 PROCEDURE — 77014 PR CT GUIDANCE PLACEMENT RAD THERAPY FIELDS: CPT | Performed by: RADIOLOGY

## 2022-03-15 PROCEDURE — 77385 HC NTSTY MODUL RAD TX DLVR SMPL: CPT | Performed by: RADIOLOGY

## 2022-03-15 PROCEDURE — 77336 RADIATION PHYSICS CONSULT: CPT | Performed by: RADIOLOGY

## 2022-03-16 ENCOUNTER — HOSPITAL ENCOUNTER (OUTPATIENT)
Dept: RADIATION ONCOLOGY | Age: 77
Discharge: HOME | End: 2022-03-16
Attending: RADIOLOGY
Payer: MEDICARE

## 2022-03-16 PROCEDURE — 77014 PR CT GUIDANCE PLACEMENT RAD THERAPY FIELDS: CPT | Performed by: RADIOLOGY

## 2022-03-16 PROCEDURE — 77385 HC NTSTY MODUL RAD TX DLVR SMPL: CPT | Performed by: RADIOLOGY

## 2022-03-17 ENCOUNTER — HOSPITAL ENCOUNTER (OUTPATIENT)
Dept: RADIATION ONCOLOGY | Age: 77
Discharge: HOME | End: 2022-03-17
Attending: RADIOLOGY
Payer: MEDICARE

## 2022-03-17 VITALS
TEMPERATURE: 96.9 F | SYSTOLIC BLOOD PRESSURE: 145 MMHG | OXYGEN SATURATION: 97 % | BODY MASS INDEX: 33.03 KG/M2 | WEIGHT: 192.4 LBS | HEART RATE: 62 BPM | DIASTOLIC BLOOD PRESSURE: 72 MMHG | RESPIRATION RATE: 16 BRPM

## 2022-03-17 PROCEDURE — 77014 PR CT GUIDANCE PLACEMENT RAD THERAPY FIELDS: CPT | Performed by: RADIOLOGY

## 2022-03-17 PROCEDURE — 77385 HC NTSTY MODUL RAD TX DLVR SMPL: CPT | Performed by: RADIOLOGY

## 2022-03-17 NOTE — PROGRESS NOTES
17 [facility]           Radiation Oncology      [address] [facility]        [place]: [phone]        F: [phone]       [email or web address]                   Dr. Griselda Rowan MD PhD    ON TREATMENT VISIT (OTV) NOTE     Date of Service: 3/17/2022  Patient ID: Jane Conway   : 1945  MRN: [id number]   Acct Number: [de-identified]       DIAGNOSIS:  Cancer Staging  Malignant tumor of prostate [facility])  Staging form: Prostate, AJCC 8th Edition  - Clinical: Stage Unknown (rcTX, cN0, cM0) - Signed by Griselda Rowan, MD on 2022      Treatment Area: Pelvis- Male    Current Total Dose(cGy): 4400 cGy  Current Fraction: 22    Patient was seen today for weekly visit. Wt Readings from Last 3 Encounters:   22 192 lb 6.4 oz (87.3 kg)   03/10/22 195 lb 3.2 oz (88.5 kg)   22 195 lb 9.6 oz (88.7 kg)       BP (!) 145/72   Pulse 62   Temp 96.9 °F (36.1 °C)   Resp 16   Wt 192 lb 6.4 oz (87.3 kg)   SpO2 97%   BMI 33.03 kg/m²     Lab Results   Component Value Date    WBC 4.0 (L) 2021     (L) 2021       Comfort Alteration  Fatigue:Able to perform daily activities with rest periods    Pain Location: Pelvis (right groin area)  Pain Intensity (Current): 1 Between no and mild pain  Pain Treatment: N/A  Pain Relief: n/a    Emotional Alteration:   Coping: Patient states he is depressed about having to have treatments becauses he has already had his prostate removed. Nutritional Alteration  Anorexia: Patient intentionally eating smaller meals, and eliminating candy for Cinthia Fix. Nausea: No nausea noted  Vomiting: No vomiting     Skin Alteration   Skin reaction: No changes noted    Elimination Alterations  Urinary Frequency: Urinating at least once an hour  Urinary Retention: Daily episodes of feeling of urinary retention  Urinary Incontinence: If the BR is close will not have any dribbling before using the toilet.   Dysuria: None  Nocturia: Approximately 3 times nightly  Proctitis: None  Diarrhea: None, last BM yesterday  Comments:     Additional Comments:     MEDICATIONS:     Current Outpatient Medications   Medication Sig Dispense Refill    pantoprazole (PROTONIX) 40 MG tablet TAKE 1 TABLET DAILY 90 tablet 3    lisinopril (PRINIVIL;ZESTRIL) 10 MG tablet TAKE 1 TABLET DAILY 90 tablet 3    psyllium (METAMUCIL) 28 % packet Take 1 packet by mouth 2 times daily Take with full glass of h20 or juice      isosorbide mononitrate (IMDUR) 60 MG extended release tablet Take 2 tablets by mouth daily 30 tablet 3    Magnesium 400 MG CAPS Take 1 capsule by mouth daily (Patient taking differently: Take 1 capsule by mouth nightly ) 90 capsule 4    atenolol (TENORMIN) 50 MG tablet Take 50 mg by mouth nightly       rosuvastatin (CRESTOR) 20 MG tablet Take 20 mg by mouth nightly       nitroGLYCERIN (NITROSTAT) 0.4 MG SL tablet Place 0.4 mg under the tongue every 5 minutes as needed for Chest pain up to max of 3 total doses. If no relief after 1 dose, call 911.  aspirin 81 MG tablet Take 81 mg by mouth daily      Cholecalciferol (VITAMIN D3) 1000 units TABS Take by mouth daily        No current facility-administered medications for this encounter. * New    PHYSICAL EXAM:       ECO - Symptomatic but completely ambulatory (Restricted in physically strenuous activity but ambulatory and able to carry out work of a light or sedentary nature. For example, light housework, office work)     General: NAD, AO x 3, Mentation is clear with appropriate affect. HEENT: Normocephalic, atraumatic  Thorax:  Unlabored  Abdomen:  Non-distended    Chemotherapy Update: Concurrent ADT    Treatment Imaging: CBCT;    ASSESSMENT: Minimal radiation side effects. Responding appropriately to symptomatic management. New medications, diagnostic results: Continue treatment as planned    PLAN: Again reviewed potential side effects of radiation for the patient's treatment.     Continue local/topical care. Discussed medication for bladder irritation/cystitis symptoms of incomplete emptying and he declined. Also discussed having him meet today with our  given some symptoms of depression/feeling down due to his diagnosis and he also declined stating that he did not feel it was necessary or would be helpful at this juncture. He will let us know if he changes his mind on both fronts. Continue current radiation course as prescribed.

## 2022-03-18 ENCOUNTER — HOSPITAL ENCOUNTER (OUTPATIENT)
Dept: RADIATION ONCOLOGY | Age: 77
Discharge: HOME | End: 2022-03-18
Attending: RADIOLOGY
Payer: MEDICARE

## 2022-03-18 PROCEDURE — 77385 HC NTSTY MODUL RAD TX DLVR SMPL: CPT | Performed by: RADIOLOGY

## 2022-03-18 PROCEDURE — 77014 PR CT GUIDANCE PLACEMENT RAD THERAPY FIELDS: CPT | Performed by: RADIOLOGY

## 2022-03-21 ENCOUNTER — HOSPITAL ENCOUNTER (OUTPATIENT)
Dept: RADIATION ONCOLOGY | Age: 77
Discharge: HOME | End: 2022-03-21
Attending: RADIOLOGY
Payer: MEDICARE

## 2022-03-21 PROCEDURE — 77014 PR CT GUIDANCE PLACEMENT RAD THERAPY FIELDS: CPT | Performed by: RADIOLOGY

## 2022-03-21 PROCEDURE — 77385 HC NTSTY MODUL RAD TX DLVR SMPL: CPT | Performed by: RADIOLOGY

## 2022-03-22 ENCOUNTER — HOSPITAL ENCOUNTER (OUTPATIENT)
Dept: RADIATION ONCOLOGY | Age: 77
Discharge: HOME | End: 2022-03-22
Attending: RADIOLOGY
Payer: MEDICARE

## 2022-03-22 PROCEDURE — 77427 RADIATION TX MANAGEMENT X5: CPT | Performed by: RADIOLOGY

## 2022-03-22 PROCEDURE — 77336 RADIATION PHYSICS CONSULT: CPT | Performed by: RADIOLOGY

## 2022-03-22 PROCEDURE — 77385 HC NTSTY MODUL RAD TX DLVR SMPL: CPT | Performed by: RADIOLOGY

## 2022-03-22 PROCEDURE — 77014 PR CT GUIDANCE PLACEMENT RAD THERAPY FIELDS: CPT | Performed by: RADIOLOGY

## 2022-03-23 ENCOUNTER — APPOINTMENT (OUTPATIENT)
Dept: RADIATION ONCOLOGY | Age: 77
End: 2022-03-23
Attending: RADIOLOGY
Payer: MEDICARE

## 2022-03-24 ENCOUNTER — HOSPITAL ENCOUNTER (OUTPATIENT)
Dept: RADIATION ONCOLOGY | Age: 77
Discharge: HOME | End: 2022-03-24
Attending: RADIOLOGY
Payer: MEDICARE

## 2022-03-24 VITALS
DIASTOLIC BLOOD PRESSURE: 65 MMHG | RESPIRATION RATE: 18 BRPM | HEART RATE: 54 BPM | OXYGEN SATURATION: 98 % | SYSTOLIC BLOOD PRESSURE: 106 MMHG | BODY MASS INDEX: 33.13 KG/M2 | TEMPERATURE: 96.8 F | WEIGHT: 193 LBS

## 2022-03-24 PROCEDURE — 77014 PR CT GUIDANCE PLACEMENT RAD THERAPY FIELDS: CPT | Performed by: RADIOLOGY

## 2022-03-24 PROCEDURE — 77385 HC NTSTY MODUL RAD TX DLVR SMPL: CPT | Performed by: RADIOLOGY

## 2022-03-24 NOTE — PROGRESS NOTES
17 [facility]           Radiation Oncology      [address] [facility]        Carlo Joe Natali: [phone]        F: [phone]       [place]                   Dr. Tiara Carbajal MD PhD    ON TREATMENT VISIT (OTV) NOTE     Date of Service: 3/24/2022  Patient ID: Susu Gordillo   : 1945  MRN: [id number]   Acct Number: [de-identified]       DIAGNOSIS:  Cancer Staging  Malignant tumor of prostate [facility])  Staging form: Prostate, AJCC 8th Edition  - Clinical: Stage Unknown (rcTX, cN0, cM0) - Signed by Tiara Carbajal MD on 2022      Treatment Area: Pelvis- Male    Current Total Dose(cGy): 5200 cGy  Current Fraction: 26    Patient was seen today for weekly visit. Wt Readings from Last 3 Encounters:   22 193 lb (87.5 kg)   22 192 lb 6.4 oz (87.3 kg)   03/10/22 195 lb 3.2 oz (88.5 kg)       /65   Pulse 54   Temp 96.8 °F (36 °C)   Resp 18   Wt 193 lb (87.5 kg)   SpO2 98%   BMI 33.13 kg/m²     Lab Results   Component Value Date    WBC 4.0 (L) 2021     (L) 2021       Comfort Alteration  Fatigue:Feels a little more tired this week, but hasn't been sleeping well this week. Pain Location: NA  Pain Intensity (Current): 0 No Pain  Pain Treatment: N/A  Pain Relief: n/a    Emotional Alteration:   Coping: Looking forward to having RT completed    Nutritional Alteration  Anorexia: Intentionally eating smaller portions, and has eliminated candy for Flo Moss. Nausea: No nausea noted  Vomiting: No vomiting     Skin Alteration   Skin reaction: No changes noted    Elimination Alterations  Urinary Frequency: Urinating less than once an hour  Urinary Retention: Some hesitancy with urination as well as some feelings of not completely emptying his bladder at times. Urinary Incontinence:  If patient doesn't get to the BR fast enough, may have some dribbling  Dysuria: A \"funny sensation\" in the penis closer to his abdomen at times, not when urinating  Nocturia: Approximately 3 times  Proctitis: None  Diarrhea: None, last BM was this morning  Comments:     Additional Comments:     MEDICATIONS:     Current Outpatient Medications   Medication Sig Dispense Refill    pantoprazole (PROTONIX) 40 MG tablet TAKE 1 TABLET DAILY 90 tablet 3    lisinopril (PRINIVIL;ZESTRIL) 10 MG tablet TAKE 1 TABLET DAILY 90 tablet 3    psyllium (METAMUCIL) 28 % packet Take 1 packet by mouth 2 times daily Take with full glass of h20 or juice      isosorbide mononitrate (IMDUR) 60 MG extended release tablet Take 2 tablets by mouth daily 30 tablet 3    Magnesium 400 MG CAPS Take 1 capsule by mouth daily (Patient taking differently: Take 1 capsule by mouth nightly ) 90 capsule 4    atenolol (TENORMIN) 50 MG tablet Take 50 mg by mouth nightly       rosuvastatin (CRESTOR) 20 MG tablet Take 20 mg by mouth nightly       nitroGLYCERIN (NITROSTAT) 0.4 MG SL tablet Place 0.4 mg under the tongue every 5 minutes as needed for Chest pain up to max of 3 total doses. If no relief after 1 dose, call 911.  aspirin 81 MG tablet Take 81 mg by mouth daily      Cholecalciferol (VITAMIN D3) 1000 units TABS Take by mouth daily        No current facility-administered medications for this encounter. * New    PHYSICAL EXAM:       ECO - Symptomatic but completely ambulatory (Restricted in physically strenuous activity but ambulatory and able to carry out work of a light or sedentary nature. For example, light housework, office work)     General: NAD, AO x 3, Mentation is clear with appropriate affect. HEENT: Normocephalic, atraumatic  Thorax:  Unlabored  Abdomen:  Non-distended    Chemotherapy Update: None    Treatment Imaging: CBCT;    ASSESSMENT: Modest radiation side effects. Responding appropriately to symptomatic management.     New medications, diagnostic results: Continue treatment as planned    PLAN: Again reviewed potential side effects of radiation for the patient's treatment. Continue local/topical care. Will continue to monitor mild dysuria for now. Continue current radiation course as prescribed.

## 2022-03-25 ENCOUNTER — HOSPITAL ENCOUNTER (OUTPATIENT)
Dept: RADIATION ONCOLOGY | Age: 77
Discharge: HOME | End: 2022-03-25
Attending: RADIOLOGY
Payer: MEDICARE

## 2022-03-25 PROCEDURE — 77014 PR CT GUIDANCE PLACEMENT RAD THERAPY FIELDS: CPT | Performed by: RADIOLOGY

## 2022-03-25 PROCEDURE — 77385 HC NTSTY MODUL RAD TX DLVR SMPL: CPT | Performed by: RADIOLOGY

## 2022-03-28 ENCOUNTER — HOSPITAL ENCOUNTER (OUTPATIENT)
Dept: RADIATION ONCOLOGY | Age: 77
Discharge: HOME | End: 2022-03-28
Attending: RADIOLOGY
Payer: MEDICARE

## 2022-03-28 PROCEDURE — 77385 HC NTSTY MODUL RAD TX DLVR SMPL: CPT | Performed by: RADIOLOGY

## 2022-03-28 PROCEDURE — 77014 PR CT GUIDANCE PLACEMENT RAD THERAPY FIELDS: CPT | Performed by: RADIOLOGY

## 2022-03-29 ENCOUNTER — HOSPITAL ENCOUNTER (OUTPATIENT)
Dept: RADIATION ONCOLOGY | Age: 77
Discharge: HOME | End: 2022-03-29
Attending: RADIOLOGY
Payer: MEDICARE

## 2022-03-29 PROCEDURE — 77014 PR CT GUIDANCE PLACEMENT RAD THERAPY FIELDS: CPT | Performed by: RADIOLOGY

## 2022-03-29 PROCEDURE — 77385 HC NTSTY MODUL RAD TX DLVR SMPL: CPT | Performed by: RADIOLOGY

## 2022-03-30 ENCOUNTER — HOSPITAL ENCOUNTER (OUTPATIENT)
Dept: RADIATION ONCOLOGY | Age: 77
Discharge: HOME | End: 2022-03-30
Attending: RADIOLOGY
Payer: MEDICARE

## 2022-03-30 PROCEDURE — 77336 RADIATION PHYSICS CONSULT: CPT | Performed by: RADIOLOGY

## 2022-03-30 PROCEDURE — 77427 RADIATION TX MANAGEMENT X5: CPT | Performed by: RADIOLOGY

## 2022-03-30 PROCEDURE — 77385 HC NTSTY MODUL RAD TX DLVR SMPL: CPT | Performed by: RADIOLOGY

## 2022-03-30 PROCEDURE — 77014 PR CT GUIDANCE PLACEMENT RAD THERAPY FIELDS: CPT | Performed by: RADIOLOGY

## 2022-03-31 ENCOUNTER — HOSPITAL ENCOUNTER (OUTPATIENT)
Dept: RADIATION ONCOLOGY | Age: 77
Discharge: HOME | End: 2022-03-31
Attending: RADIOLOGY
Payer: MEDICARE

## 2022-03-31 VITALS
BODY MASS INDEX: 32.68 KG/M2 | TEMPERATURE: 96.9 F | OXYGEN SATURATION: 97 % | DIASTOLIC BLOOD PRESSURE: 86 MMHG | SYSTOLIC BLOOD PRESSURE: 155 MMHG | WEIGHT: 190.4 LBS | HEART RATE: 55 BPM | RESPIRATION RATE: 18 BRPM

## 2022-03-31 PROCEDURE — 77014 PR CT GUIDANCE PLACEMENT RAD THERAPY FIELDS: CPT | Performed by: RADIOLOGY

## 2022-03-31 PROCEDURE — 77385 HC NTSTY MODUL RAD TX DLVR SMPL: CPT | Performed by: RADIOLOGY

## 2022-03-31 RX ORDER — SIMETHICONE 180 MG
CAPSULE ORAL AS NEEDED
COMMUNITY
End: 2022-04-18 | Stop reason: ALTCHOICE

## 2022-03-31 NOTE — PROGRESS NOTES
17 [facility]           Radiation Oncology      [address] [facility]        [address] [place] [address]        Josr Libia: [phone]        F: [phone]       [employer]                   Dr. Clyde Colon MD PhD    ON TREATMENT VISIT (OTV) NOTE     Date of Service: 3/31/2022  Patient ID: Samul Babinski   : 1945  MRN: [id number]   Acct Number: [de-identified]       DIAGNOSIS:  Cancer Staging  Malignant tumor of prostate [facility])  Staging form: Prostate, AJCC 8th Edition  - Clinical: Stage Unknown (rcTX, cN0, cM0) - Signed by Clyde Colon MD on 2022      Treatment Area: Pelvis- Male    Current Total Dose(cGy): 6200  Current Fraction: 31    Patient was seen today for weekly visit. Wt Readings from Last 3 Encounters:   22 190 lb 6.4 oz (86.4 kg)   22 193 lb (87.5 kg)   22 192 lb 6.4 oz (87.3 kg)       BP (!) 155/86   Pulse 55   Temp 96.9 °F (36.1 °C)   Resp 18   Wt 190 lb 6.4 oz (86.4 kg)   SpO2 97%   BMI 32.68 kg/m²     Lab Results   Component Value Date    WBC 4.0 (L) 2021     (L) 2021       Comfort Alteration  Fatigue:Able to perform daily activities with rest periods    Pain Location: none  Pain Intensity (Current): 0 No Pain  Pain Treatment: No treatment scheduled  Pain Relief: n/a    Emotional Alteration:   Coping: effective    Nutritional Alteration  Anorexia: Loss of appetite but able to eat smaller portions of food and/or liquids  Nausea: No nausea noted  Vomiting: No vomiting     Skin Alteration   Skin reaction: No changes noted    Elimination Alterations  Urinary Frequency: Urinating less than once an hour  Urinary Retention: Hesitancy or dribbling, retention during post op period, some hesitancy and dribbling occasionally  Urinary Incontinence: None  Dysuria: None  Nocturia: 1-3 times nightly, three last night  Proctitis: None  Diarrhea: 3-4 soft stools a day  Comments: . Additional Comments: Rickey Barros     MEDICATIONS:     Current Outpatient Medications   Medication Sig Dispense Refill    Simethicone (GAS RELIEF) 180 MG CAPS Take by mouth as needed      pantoprazole (PROTONIX) 40 MG tablet TAKE 1 TABLET DAILY 90 tablet 3    lisinopril (PRINIVIL;ZESTRIL) 10 MG tablet TAKE 1 TABLET DAILY 90 tablet 3    psyllium (METAMUCIL) 28 % packet Take 1 packet by mouth 2 times daily Take with full glass of h20 or juice      isosorbide mononitrate (IMDUR) 60 MG extended release tablet Take 2 tablets by mouth daily 30 tablet 3    Magnesium 400 MG CAPS Take 1 capsule by mouth daily (Patient taking differently: Take 1 capsule by mouth nightly ) 90 capsule 4    atenolol (TENORMIN) 50 MG tablet Take 50 mg by mouth nightly       rosuvastatin (CRESTOR) 20 MG tablet Take 20 mg by mouth nightly       nitroGLYCERIN (NITROSTAT) 0.4 MG SL tablet Place 0.4 mg under the tongue every 5 minutes as needed for Chest pain up to max of 3 total doses. If no relief after 1 dose, call 911.  aspirin 81 MG tablet Take 81 mg by mouth daily      Cholecalciferol (VITAMIN D3) 1000 units TABS Take by mouth daily        No current facility-administered medications for this encounter. * New    PHYSICAL EXAM:       ECO - Symptomatic but completely ambulatory (Restricted in physically strenuous activity but ambulatory and able to carry out work of a light or sedentary nature. For example, light housework, office work)     General: NAD, AO x 3, Mentation is clear with appropriate affect. HEENT: Normocephalic, atraumatic  Thorax:  Unlabored  Abdomen:  Non-distended    Chemotherapy Update: Concurrent ADT    Treatment Imaging: CBCT;    ASSESSMENT: Modest radiation side effects. Responding appropriately to symptomatic management. New medications, diagnostic results: Continue treatment as planned    PLAN: Again reviewed potential side effects of radiation for the patient's treatment. Continue local/topical care.  Will continue to monitor mild incontinence and symptoms of acute radiation cystitis, per patient preference he does not wish to start any new medications for this. Continue current radiation course as prescribed.

## 2022-04-01 ENCOUNTER — HOSPITAL ENCOUNTER (OUTPATIENT)
Dept: RADIATION ONCOLOGY | Age: 77
Discharge: HOME | End: 2022-04-01
Attending: RADIOLOGY
Payer: MEDICARE

## 2022-04-01 PROCEDURE — 77014 PR CT GUIDANCE PLACEMENT RAD THERAPY FIELDS: CPT | Performed by: RADIOLOGY

## 2022-04-01 PROCEDURE — 77385 HC NTSTY MODUL RAD TX DLVR SMPL: CPT | Performed by: RADIOLOGY

## 2022-04-04 ENCOUNTER — HOSPITAL ENCOUNTER (OUTPATIENT)
Dept: RADIATION ONCOLOGY | Age: 77
Discharge: HOME | End: 2022-04-04
Attending: RADIOLOGY
Payer: MEDICARE

## 2022-04-04 PROCEDURE — 77014 PR CT GUIDANCE PLACEMENT RAD THERAPY FIELDS: CPT | Performed by: RADIOLOGY

## 2022-04-04 PROCEDURE — 77385 HC NTSTY MODUL RAD TX DLVR SMPL: CPT | Performed by: RADIOLOGY

## 2022-04-05 ENCOUNTER — HOSPITAL ENCOUNTER (OUTPATIENT)
Dept: RADIATION ONCOLOGY | Age: 77
Discharge: HOME | End: 2022-04-05
Attending: RADIOLOGY
Payer: MEDICARE

## 2022-04-05 PROCEDURE — 77014 PR CT GUIDANCE PLACEMENT RAD THERAPY FIELDS: CPT | Performed by: RADIOLOGY

## 2022-04-05 PROCEDURE — 77385 HC NTSTY MODUL RAD TX DLVR SMPL: CPT | Performed by: RADIOLOGY

## 2022-04-06 ENCOUNTER — HOSPITAL ENCOUNTER (OUTPATIENT)
Dept: RADIATION ONCOLOGY | Age: 77
Discharge: HOME | End: 2022-04-06
Attending: RADIOLOGY
Payer: MEDICARE

## 2022-04-06 PROCEDURE — 77385 HC NTSTY MODUL RAD TX DLVR SMPL: CPT | Performed by: RADIOLOGY

## 2022-04-06 PROCEDURE — 77014 PR CT GUIDANCE PLACEMENT RAD THERAPY FIELDS: CPT | Performed by: RADIOLOGY

## 2022-04-06 PROCEDURE — 77427 RADIATION TX MANAGEMENT X5: CPT | Performed by: RADIOLOGY

## 2022-04-07 ENCOUNTER — HOSPITAL ENCOUNTER (OUTPATIENT)
Dept: RADIATION ONCOLOGY | Age: 77
Discharge: HOME | End: 2022-04-07
Attending: RADIOLOGY
Payer: MEDICARE

## 2022-04-07 VITALS
RESPIRATION RATE: 18 BRPM | DIASTOLIC BLOOD PRESSURE: 61 MMHG | TEMPERATURE: 96.6 F | BODY MASS INDEX: 32.65 KG/M2 | HEART RATE: 50 BPM | SYSTOLIC BLOOD PRESSURE: 127 MMHG | WEIGHT: 190.2 LBS | OXYGEN SATURATION: 97 %

## 2022-04-07 PROCEDURE — 77385 HC NTSTY MODUL RAD TX DLVR SMPL: CPT | Performed by: RADIOLOGY

## 2022-04-07 PROCEDURE — 77014 PR CT GUIDANCE PLACEMENT RAD THERAPY FIELDS: CPT | Performed by: RADIOLOGY

## 2022-04-07 NOTE — PROGRESS NOTES
17 [facility]           Radiation Oncology      [address] [facility]        Carlo Joe Snow: [phone]        F: [phone]       [employer]                   Dr. Jo Lancaster MD PhD    ON TREATMENT VISIT (OTV) NOTE     Date of Service: 2022  Patient ID: Ebony Najjar   : 1945  MRN: [id number]   Acct Number: [de-identified]       DIAGNOSIS:  Cancer Staging  Malignant tumor of prostate [facility])  Staging form: Prostate, AJCC 8th Edition  - Clinical: Stage Unknown (rcTX, cN0, cM0) - Signed by Jo Lancaster MD on 2022      Treatment Area: Pelvis- Male    Current Total Dose(cGy): 7200  Current Fraction: 36    Patient was seen today for weekly visit. Wt Readings from Last 3 Encounters:   22 190 lb 3.2 oz (86.3 kg)   22 190 lb 6.4 oz (86.4 kg)   22 193 lb (87.5 kg)       /61   Pulse 50   Temp 96.6 °F (35.9 °C)   Resp 18   Wt 190 lb 3.2 oz (86.3 kg)   SpO2 97%   BMI 32.65 kg/m²     Lab Results   Component Value Date    WBC 4.0 (L) 2021     (L) 2021       Comfort Alteration  Fatigue:None, able to perform daily activities- occasional nap     Pain Location:denies      Emotional Alteration:   Coping: effective    Nutritional Alteration  Anorexia: none   Nausea: No nausea noted  Vomiting: No vomiting     Skin Alteration   Skin reaction: No changes noted    Elimination Alterations  Urinary Frequency: Urinating less than once an hour  Urinary Retention: Normal- occasional retention   Urinary Incontinence: None  Dysuria: None  Nocturia: 1-3 times nightly  Proctitis: None  Diarrhea: None  Comments:     Additional Comments: to see  end  for ADT, labs. Discharge instructions reviewed.     MEDICATIONS:     Current Outpatient Medications   Medication Sig Dispense Refill    Simethicone (GAS RELIEF) 180 MG CAPS Take by mouth as needed      pantoprazole (PROTONIX) 40 MG tablet TAKE 1 TABLET DAILY 90 tablet 3    lisinopril (PRINIVIL;ZESTRIL) 10 MG tablet TAKE 1 TABLET DAILY 90 tablet 3    psyllium (METAMUCIL) 28 % packet Take 1 packet by mouth 2 times daily Take with full glass of h20 or juice      isosorbide mononitrate (IMDUR) 60 MG extended release tablet Take 2 tablets by mouth daily 30 tablet 3    Magnesium 400 MG CAPS Take 1 capsule by mouth daily (Patient taking differently: Take 1 capsule by mouth nightly ) 90 capsule 4    atenolol (TENORMIN) 50 MG tablet Take 50 mg by mouth nightly       rosuvastatin (CRESTOR) 20 MG tablet Take 20 mg by mouth nightly       nitroGLYCERIN (NITROSTAT) 0.4 MG SL tablet Place 0.4 mg under the tongue every 5 minutes as needed for Chest pain up to max of 3 total doses. If no relief after 1 dose, call 911.  aspirin 81 MG tablet Take 81 mg by mouth daily      Cholecalciferol (VITAMIN D3) 1000 units TABS Take by mouth daily        No current facility-administered medications for this encounter. * New    PHYSICAL EXAM:       ECO - Symptomatic but completely ambulatory (Restricted in physically strenuous activity but ambulatory and able to carry out work of a light or sedentary nature. For example, light housework, office work)     General: NAD, AO x 3, Mentation is clear with appropriate affect. HEENT: Normocephalic, atraumatic  Thorax:  Unlabored  Abdomen:  Non-distended    Chemotherapy Update: Concurrent ADT    Treatment Imaging: CBCT;    ASSESSMENT: Modest radiation side effects. Responding appropriately to symptomatic management. New medications, diagnostic results: Continue treatment as planned    PLAN: Again reviewed potential side effects of radiation for the patient's treatment. Continue local/topical care. Completed RT today. Will have him return in 1 month for quick check. Urinary symptoms are stable and not bothersome, patient will let us know if this changes in the interim.  He will be seeing Dr. Harish Brady with medical oncology again on , with plan for lupron injection on that date. Continue current radiation course as prescribed.

## 2022-04-12 ENCOUNTER — OFFICE VISIT (OUTPATIENT)
Dept: CARDIOLOGY CLINIC | Age: 77
End: 2022-04-12
Payer: MEDICARE

## 2022-04-12 VITALS
DIASTOLIC BLOOD PRESSURE: 72 MMHG | SYSTOLIC BLOOD PRESSURE: 126 MMHG | OXYGEN SATURATION: 96 % | HEART RATE: 60 BPM | WEIGHT: 192 LBS | HEIGHT: 64 IN | BODY MASS INDEX: 32.78 KG/M2

## 2022-04-12 DIAGNOSIS — E78.00 PURE HYPERCHOLESTEROLEMIA: ICD-10-CM

## 2022-04-12 DIAGNOSIS — G47.62 NOCTURNAL LEG CRAMPS: ICD-10-CM

## 2022-04-12 DIAGNOSIS — I10 ESSENTIAL HYPERTENSION: ICD-10-CM

## 2022-04-12 DIAGNOSIS — I25.10 CORONARY ARTERY DISEASE INVOLVING NATIVE CORONARY ARTERY OF NATIVE HEART WITHOUT ANGINA PECTORIS: Primary | ICD-10-CM

## 2022-04-12 DIAGNOSIS — Z87.891 H/O NICOTINE DEPENDENCE: Chronic | ICD-10-CM

## 2022-04-12 PROCEDURE — G8427 DOCREV CUR MEDS BY ELIG CLIN: HCPCS | Performed by: INTERNAL MEDICINE

## 2022-04-12 PROCEDURE — 1123F ACP DISCUSS/DSCN MKR DOCD: CPT | Performed by: INTERNAL MEDICINE

## 2022-04-12 PROCEDURE — G8417 CALC BMI ABV UP PARAM F/U: HCPCS | Performed by: INTERNAL MEDICINE

## 2022-04-12 PROCEDURE — 3017F COLORECTAL CA SCREEN DOC REV: CPT | Performed by: INTERNAL MEDICINE

## 2022-04-12 PROCEDURE — 93000 ELECTROCARDIOGRAM COMPLETE: CPT | Performed by: INTERNAL MEDICINE

## 2022-04-12 PROCEDURE — 99204 OFFICE O/P NEW MOD 45 MIN: CPT | Performed by: INTERNAL MEDICINE

## 2022-04-12 PROCEDURE — 4040F PNEUMOC VAC/ADMIN/RCVD: CPT | Performed by: INTERNAL MEDICINE

## 2022-04-12 PROCEDURE — 1036F TOBACCO NON-USER: CPT | Performed by: INTERNAL MEDICINE

## 2022-04-12 RX ORDER — ISOSORBIDE MONONITRATE 60 MG/1
120 TABLET, EXTENDED RELEASE ORAL DAILY
Qty: 180 TABLET | Refills: 3 | Status: SHIPPED | OUTPATIENT
Start: 2022-04-12 | End: 2022-06-20

## 2022-04-12 NOTE — PROGRESS NOTES
Chief Complaint   Patient presents with    New Patient     Dr Derick Gordillo referred    Coronary Artery Disease     8 stents total, 7 in RCA       4-12-22: Patient presents for initial medical evaluation. Patient is followed on a regular basis by Dr. Jeana León MD. Hx of CAD s/p 8 stents, 7 to RCA and one CX,   Hypertension, hyperlipidemia, Shaw's esophagus, who presents for general cardiology follow-up. Previous [facility] patient. Patient underwent cardiac catheterization 2019 with no PCI performed at that time. Pt denies chest pain, dyspnea, dyspnea on exertion, change in exercise capacity, fatigue,  nausea, vomiting, diarrhea, constipation, motor weakness, insomnia, weight loss, syncope, dizziness, lightheadedness, palpitations, PND, orthopnea. + cramps at night. Patient with history of recurrent prostate cancer, undergoing Radiation tx. LDL is 68  EKG with SB at 50bpm, non specific changes.            Patient Active Problem List   Diagnosis    Chronic right hip pain    Coronary artery disease involving native coronary artery of native heart without angina pectoris    H/O prostate cancer    Essential hypertension    Shaw esophagus    Alcohol use disorder, mild, abuse    H/O nicotine dependence    Nocturnal leg cramps    Chest pain    Abnormal glucose    Malignant tumor of prostate ([facility] [address]    Hyperlipidemia    Sensorineural hearing loss (SNHL) of both ears    Pain of right heel    Tenesmus    Change in stool    Generalized abdominal pain    Other insomnia    Memory change    Bilateral impacted cerumen       Past Surgical History:   Procedure Laterality Date    CARDIAC CATHETERIZATION  12/2019    CATARACT REMOVAL WITH IMPLANT Bilateral 06/2013    COLONOSCOPY  04/2016    COLONOSCOPY N/A 4/15/2021    COLORECTAL CANCER SCREENING, HIGH RISK performed by Ursula Crespo MD at [address]  06/1998    CORONARY ANGIOPLASTY WITH STENT PLACEMENT  2007    CORONARY ANGIOPLASTY WITH STENT PLACEMENT  2009    CORONARY ANGIOPLASTY WITH STENT PLACEMENT  2012    CYSTOSCOPY  04/15/2021    HEMORRHOID SURGERY  2013    INGUINAL HERNIA REPAIR  1990    PROSTATE SURGERY  2003    removed    PROSTATE SURGERY  2013    biopsy    UPPER GASTROINTESTINAL ENDOSCOPY  2018    UPPER GASTROINTESTINAL ENDOSCOPY N/A 4/15/2021    EGD DIAGNOSTIC ONLY performed by Lewis Chirinos MD at [address] ENDOSCOPY  04/15/2021    VASECTOMY         Social History     Socioeconomic History    Marital status:      Spouse name: Not on file    Number of children: Not on file    Years of education: Not on file    Highest education level: Not on file   Occupational History    Not on file   Tobacco Use    Smoking status: Former Smoker     Packs/day: 1.00     Years: 51.00     Pack years: 51.00     Types: Cigarettes     Start date: 58     Quit date: 3/9/2011     Years since quittin.1    Smokeless tobacco: Never Used    Tobacco comment: started age 17    Vaping Use    Vaping Use: Never used   Substance and Sexual Activity    Alcohol use: Yes     Alcohol/week: 2.0 - 3.0 standard drinks     Types: 2 - 3 Cans of beer per week     Comment: 2-3 weeks, 2-4 drings 2-3/per week    Drug use: Never    Sexual activity: Never   Other Topics Concern    Not on file   Social History Narrative    Not on file     Social Determinants of Health     Financial Resource Strain: Low Risk     Difficulty of Paying Living Expenses: Not hard at all   Food Insecurity: No Food Insecurity    Worried About Running Out of Food in the Last Year: Never true    Lisa of Food in the Last Year: Never true   Transportation Needs: No Transportation Needs    Lack of Transportation (Medical): No    Lack of Transportation (Non-Medical):  No   Physical Activity:     Days of Exercise per Week: Not on file    Minutes of Exercise per Session: Not on file   Stress:     Feeling of Stress : Not on file   Social Connections:     Frequency of Communication with Friends and Family: Not on file    Frequency of Social Gatherings with Friends and Family: Not on file    Attends Orthodox Services: Not on file    Active Member of Clubs or Organizations: Not on file    Attends Club or Organization Meetings: Not on file    Marital Status: Not on file   Intimate Partner Violence:     Fear of Current or Ex-Partner: Not on file    Emotionally Abused: Not on file    Physically Abused: Not on file    Sexually Abused: Not on file   Housing Stability:     Unable to Pay for Housing in the Last Year: Not on file    Number of Places Lived in the Last Year: Not on file    Unstable Housing in the Last Year: Not on file       Family History   Problem Relation Age of Onset    Cancer Father         prostate    Heart Disease Father     Cancer Brother         prostate    Heart Disease Brother     Heart Attack Brother     Heart Disease Paternal Uncle     Cancer Other     Stroke Mother     Heart Disease Brother     Heart Disease Brother     Heart Disease Brother     Heart Disease Brother     Colon Cancer Neg Hx        Current Outpatient Medications   Medication Sig Dispense Refill    pantoprazole (PROTONIX) 40 MG tablet TAKE 1 TABLET DAILY 90 tablet 3    lisinopril (PRINIVIL;ZESTRIL) 10 MG tablet TAKE 1 TABLET DAILY 90 tablet 3    psyllium (METAMUCIL) 28 % packet Take 1 packet by mouth 2 times daily Take with full glass of h20 or juice      isosorbide mononitrate (IMDUR) 60 MG extended release tablet Take 2 tablets by mouth daily 30 tablet 3    Magnesium 400 MG CAPS Take 1 capsule by mouth daily (Patient taking differently: Take 1 capsule by mouth nightly ) 90 capsule 4    atenolol (TENORMIN) 50 MG tablet Take 50 mg by mouth nightly       rosuvastatin (CRESTOR) 20 MG tablet Take 20 mg by mouth nightly       nitroGLYCERIN (NITROSTAT) 0.4 MG SL tablet Place 0.4 mg under the tongue every 5 minutes as needed for Chest pain up to max of 3 total doses. If no relief after 1 dose, call 911.  aspirin 81 MG tablet Take 81 mg by mouth daily      Cholecalciferol (VITAMIN D3) 1000 units TABS Take by mouth daily       Simethicone (GAS RELIEF) 180 MG CAPS Take by mouth as needed       No current facility-administered medications for this visit. Iodides    Review of Systems:  General ROS: negative  Psychological ROS: negative  Hematological and Lymphatic ROS: No history of blood clots or bleeding disorder. Respiratory ROS: no cough, shortness of breath, or wheezing  Cardiovascular ROS: no chest pain or dyspnea on exertion  Gastrointestinal ROS: no abdominal pain, change in bowel habits, or black or bloody stools  Genito-Urinary ROS: no dysuria, trouble voiding, or hematuria  Musculoskeletal ROS: negative  Neurological ROS: no TIA or stroke symptoms  Dermatological ROS: negative    VITALS:  Blood pressure 126/72, pulse 60, height 5' 4\" (1.626 m), weight 192 lb (87.1 kg), SpO2 96 %. Body mass index is 32.96 kg/m². Physical Examination:  General appearance - alert, well appearing, and in no distress  Mental status - alert, oriented to person, place, and time  Neck - Neck is supple, no JVD or carotid bruits. No thyromegaly or adenopathy. Chest - clear to auscultation, no wheezes, rales or rhonchi, symmetric air entry  Heart - normal rate, regular rhythm, normal S1, S2, no murmurs, rubs, clicks or gallops  Abdomen - soft, nontender, nondistended, no masses or organomegaly  Neurological - alert, oriented, normal speech, no focal findings or movement disorder noted  Extremities - peripheral pulses normal, no pedal edema, no clubbing or cyanosis  Skin - normal coloration and turgor, no rashes, no suspicious skin lesions noted    Pulses:   Carotid pulses are 3+ on the right side, and 3+ on the left side.    Radial pulses are 3+ on the right side, and 3+ on the left side. Femoral pulses are 3+ on the right side, and 3+ on the left side. Popliteal pulses are 3+ on the right side, and 3+ on the left side. EKG: normal sinus rhythm, nonspecific ST and T waves changes    Orders Placed This Encounter   Procedures    EKG 12 lead       ASSESSMENT:     Diagnosis Orders   1. Coronary artery disease involving native coronary artery of native heart without angina pectoris  EKG 12 lead   2. Essential hypertension  EKG 12 lead   3. H/O nicotine dependence     4. Pure hypercholesterolemia     5. Nocturnal leg cramps           PLAN:         As always, aggressive risk factor modification is strongly recommended. We should adhere to the JNC VIII guidelines for HTN management and the NCEP ATP III guidelines for LDL-C management. Cardiac diet is always recommended with low fat, cholesterol, calories and sodium. Continue medications at current doses. Check EKG    Magnesium for cramps- helping. Patient was advised and encouraged to check blood pressure at home or at a pharmacy, maintain a logbook, and also call us back if blood pressure are above the target ranges or if it is low. Patient clearly understands and agrees to the instructions. We will need to continue to monitor muscle and liver enzymes, BUN, CR, and electrolytes. Details of medical condition explained and patient was warned about adverse consequences of uncontrolled medical conditions and possible side effects of prescribed medications.

## 2022-04-18 ENCOUNTER — OFFICE VISIT (OUTPATIENT)
Dept: FAMILY MEDICINE CLINIC | Age: 77
End: 2022-04-18
Payer: MEDICARE

## 2022-04-18 VITALS
OXYGEN SATURATION: 98 % | SYSTOLIC BLOOD PRESSURE: 112 MMHG | TEMPERATURE: 97.4 F | DIASTOLIC BLOOD PRESSURE: 68 MMHG | BODY MASS INDEX: 32.95 KG/M2 | HEART RATE: 56 BPM | WEIGHT: 193 LBS | RESPIRATION RATE: 16 BRPM | HEIGHT: 64 IN

## 2022-04-18 DIAGNOSIS — I95.2 HYPOTENSION DUE TO DRUGS: ICD-10-CM

## 2022-04-18 DIAGNOSIS — Z00.00 MEDICARE ANNUAL WELLNESS VISIT, SUBSEQUENT: ICD-10-CM

## 2022-04-18 DIAGNOSIS — C61 MALIGNANT TUMOR OF PROSTATE (HCC): ICD-10-CM

## 2022-04-18 DIAGNOSIS — Z00.00 WELCOME TO MEDICARE PREVENTIVE VISIT: Primary | ICD-10-CM

## 2022-04-18 DIAGNOSIS — I10 ESSENTIAL HYPERTENSION: Chronic | ICD-10-CM

## 2022-04-18 DIAGNOSIS — R41.3 MEMORY CHANGE: Chronic | ICD-10-CM

## 2022-04-18 DIAGNOSIS — E78.00 PURE HYPERCHOLESTEROLEMIA: ICD-10-CM

## 2022-04-18 DIAGNOSIS — I25.10 CORONARY ARTERY DISEASE INVOLVING NATIVE CORONARY ARTERY OF NATIVE HEART WITHOUT ANGINA PECTORIS: Primary | Chronic | ICD-10-CM

## 2022-04-18 PROCEDURE — 99214 OFFICE O/P EST MOD 30 MIN: CPT | Performed by: FAMILY MEDICINE

## 2022-04-18 PROCEDURE — G8427 DOCREV CUR MEDS BY ELIG CLIN: HCPCS | Performed by: FAMILY MEDICINE

## 2022-04-18 PROCEDURE — G0402 INITIAL PREVENTIVE EXAM: HCPCS | Performed by: FAMILY MEDICINE

## 2022-04-18 PROCEDURE — 4040F PNEUMOC VAC/ADMIN/RCVD: CPT | Performed by: FAMILY MEDICINE

## 2022-04-18 PROCEDURE — 1123F ACP DISCUSS/DSCN MKR DOCD: CPT | Performed by: FAMILY MEDICINE

## 2022-04-18 PROCEDURE — 3017F COLORECTAL CA SCREEN DOC REV: CPT | Performed by: FAMILY MEDICINE

## 2022-04-18 PROCEDURE — 1036F TOBACCO NON-USER: CPT | Performed by: FAMILY MEDICINE

## 2022-04-18 PROCEDURE — G8417 CALC BMI ABV UP PARAM F/U: HCPCS | Performed by: FAMILY MEDICINE

## 2022-04-18 ASSESSMENT — LIFESTYLE VARIABLES
HOW OFTEN DO YOU HAVE A DRINK CONTAINING ALCOHOL: 2-3 TIMES A WEEK
HOW OFTEN DURING THE LAST YEAR HAVE YOU HAD A FEELING OF GUILT OR REMORSE AFTER DRINKING: 0
HAS A RELATIVE, FRIEND, DOCTOR, OR ANOTHER HEALTH PROFESSIONAL EXPRESSED CONCERN ABOUT YOUR DRINKING OR SUGGESTED YOU CUT DOWN: 0
HOW OFTEN DURING THE LAST YEAR HAVE YOU NEEDED AN ALCOHOLIC DRINK FIRST THING IN THE MORNING TO GET YOURSELF GOING AFTER A NIGHT OF HEAVY DRINKING: 0
HOW OFTEN DURING THE LAST YEAR HAVE YOU FAILED TO DO WHAT WAS NORMALLY EXPECTED FROM YOU BECAUSE OF DRINKING: 0
HOW MANY STANDARD DRINKS CONTAINING ALCOHOL DO YOU HAVE ON A TYPICAL DAY: 3 OR 4
HOW OFTEN DURING THE LAST YEAR HAVE YOU FOUND THAT YOU WERE NOT ABLE TO STOP DRINKING ONCE YOU HAD STARTED: 0
HOW OFTEN DURING THE LAST YEAR HAVE YOU BEEN UNABLE TO REMEMBER WHAT HAPPENED THE NIGHT BEFORE BECAUSE YOU HAD BEEN DRINKING: 0
HAVE YOU OR SOMEONE ELSE BEEN INJURED AS A RESULT OF YOUR DRINKING: 0

## 2022-04-18 ASSESSMENT — PATIENT HEALTH QUESTIONNAIRE - PHQ9
1. LITTLE INTEREST OR PLEASURE IN DOING THINGS: 1
SUM OF ALL RESPONSES TO PHQ QUESTIONS 1-9: 2
SUM OF ALL RESPONSES TO PHQ QUESTIONS 1-9: 2
2. FEELING DOWN, DEPRESSED OR HOPELESS: 1
SUM OF ALL RESPONSES TO PHQ QUESTIONS 1-9: 2
SUM OF ALL RESPONSES TO PHQ QUESTIONS 1-9: 2
SUM OF ALL RESPONSES TO PHQ9 QUESTIONS 1 & 2: 2

## 2022-04-18 NOTE — PROGRESS NOTES
Subjective  Pinky Villalobos, 78 y.o. male presents today with:  Chief Complaint   Patient presents with    Follow-up     having some memory loss, left knee pain constantly; having headaches off and on and some low blood pressures a couple times a week            HPI    Patient is here for follow-up of CAD/HTN. Notes he has been getting low blood pressure - 70/40, 80/60. Paulo Miller Gets lightheaded and mild headaches. IS taking Imdur 120 mg once a day. No chest pain, shortness of breath, palpitations, edema, paroxysmal nocturnal dyspnea, orthopnea. Is compliant with meds which do not cause significant side effects. Avoids added salt; exercises occasionally and tries to eat a healthy diet. Memory issues. Name recall and short term loss. Minicog - missed one word. Occasionally does not recall having had a conversation. Ran a tank of propane out because he forgot to turn it off. Not missing appointments or meds. No med errors. Uses written reminders. Wife with long-term memory loss. Exercise 5-7 days a week. Mood - has been depressed and irritable since dx of recurrent prostate ca which was just treated with 36 radx. Issues with wife who he feels was dismissive of dx but understands she was trying to make him feel better. GERD. Well-controlled with PPI, caffeine restriction, diet restriction. No weight loss. No abdominal pain. No bloody or black tarry stools. Left knee is constantly hurting. Has gelling. WOrse with changing position in bed, prolonged weight-bearing. No catching Ascending and descending stairs are equally painful. No other questions and or concerns for today's visit      Review of Systems see above.        Past Medical History:   Diagnosis Date    Cancer [facility])     prostate    Hyperlipidemia     Hypertension      Past Surgical History:   Procedure Laterality Date    CARDIAC CATHETERIZATION  12/2019    CATARACT REMOVAL WITH IMPLANT Bilateral 06/2013    COLONOSCOPY  04/2016    COLONOSCOPY N/A 4/15/2021    COLORECTAL CANCER SCREENING, HIGH RISK performed by Mario Myers MD at [address]  1998    CORONARY ANGIOPLASTY WITH STENT PLACEMENT  2007    CORONARY ANGIOPLASTY WITH STENT PLACEMENT  2009    CORONARY ANGIOPLASTY WITH STENT PLACEMENT  2012    CYSTOSCOPY  04/15/2021    HEMORRHOID SURGERY  2013    INGUINAL HERNIA REPAIR  1990    PROSTATE SURGERY  2003    removed    PROSTATE SURGERY  2013    biopsy    UPPER GASTROINTESTINAL ENDOSCOPY  2018    UPPER GASTROINTESTINAL ENDOSCOPY N/A 4/15/2021    EGD DIAGNOSTIC ONLY performed by Mario Myers MD at [address] ENDOSCOPY  04/15/2021    VASECTOMY       Social History     Socioeconomic History    Marital status:      Spouse name: Not on file    Number of children: Not on file    Years of education: Not on file    Highest education level: Not on file   Occupational History    Not on file   Tobacco Use    Smoking status: Former Smoker     Packs/day: 1.00     Years: 51.00     Pack years: 51.00     Types: Cigarettes     Start date: 58     Quit date: 3/9/2011     Years since quittin.1    Smokeless tobacco: Never Used    Tobacco comment: started age 17    Vaping Use    Vaping Use: Never used   Substance and Sexual Activity    Alcohol use:  Yes     Alcohol/week: 2.0 - 3.0 standard drinks     Types: 2 - 3 Cans of beer per week     Comment: 2-3 weeks, 2-4 drings 2-3/per week    Drug use: Never    Sexual activity: Never   Other Topics Concern    Not on file   Social History Narrative    Not on file     Social Determinants of Health     Financial Resource Strain: Low Risk     Difficulty of Paying Living Expenses: Not hard at all   Food Insecurity: No Food Insecurity    Worried About Running Out of Food in the Last Year: Never true    Lisa of Food in the Last Year: Never true   Transportation Needs: No Transportation Needs    Lack of Transportation (Medical): No    Lack of Transportation (Non-Medical):  No   Physical Activity: Sufficiently Active    Days of Exercise per Week: 5 days    Minutes of Exercise per Session: 30 min   Stress:     Feeling of Stress : Not on file   Social Connections:     Frequency of Communication with Friends and Family: Not on file    Frequency of Social Gatherings with Friends and Family: Not on file    Attends Jehovah's witness Services: Not on file    Active Member of Clubs or Organizations: Not on file    Attends Club or Organization Meetings: Not on file    Marital Status: Not on file   Intimate Partner Violence:     Fear of Current or Ex-Partner: Not on file    Emotionally Abused: Not on file    Physically Abused: Not on file    Sexually Abused: Not on file   Housing Stability:     Unable to Pay for Housing in the Last Year: Not on file    Number of Places Lived in the Last Year: Not on file    Unstable Housing in the Last Year: Not on file     Family History   Problem Relation Age of Onset    Cancer Father         prostate    Heart Disease Father     Cancer Brother         prostate    Heart Disease Brother     Heart Attack Brother     Heart Disease Paternal Uncle     Cancer Other     Stroke Mother     Heart Disease Brother     Heart Disease Brother     Heart Disease Brother     Heart Disease Brother     Colon Cancer Neg Hx      Allergies   Allergen Reactions    Iodides Nausea And Vomiting     Contrast Dye     Current Outpatient Medications   Medication Sig Dispense Refill    isosorbide mononitrate (IMDUR) 60 MG extended release tablet Take 2 tablets by mouth daily 180 tablet 3    pantoprazole (PROTONIX) 40 MG tablet TAKE 1 TABLET DAILY 90 tablet 3    lisinopril (PRINIVIL;ZESTRIL) 10 MG tablet TAKE 1 TABLET DAILY 90 tablet 3    psyllium (METAMUCIL) 28 % packet Take 1 packet by mouth 2 times daily Take with full glass of h20 or juice      Magnesium 400 MG CAPS Take 1 capsule by mouth daily (Patient taking differently: Take 1 capsule by mouth nightly ) 90 capsule 4    atenolol (TENORMIN) 50 MG tablet Take 50 mg by mouth nightly       rosuvastatin (CRESTOR) 20 MG tablet Take 20 mg by mouth nightly       nitroGLYCERIN (NITROSTAT) 0.4 MG SL tablet Place 0.4 mg under the tongue every 5 minutes as needed for Chest pain up to max of 3 total doses. If no relief after 1 dose, call 911.  aspirin 81 MG tablet Take 81 mg by mouth daily      Cholecalciferol (VITAMIN D3) 1000 units TABS Take by mouth daily        No current facility-administered medications for this visit. PMH, Surgical Hx, Family Hx, and Social Hxreviewed and updated. Health Maintenance reviewed. Objective    Vitals:    04/18/22 0832   BP: 112/68   Site: Right Upper Arm   Position: Sitting   Cuff Size: Medium Adult   Pulse: 56   Resp: 16   Temp: 97.4 °F (36.3 °C)   TempSrc: Temporal   SpO2: 98%   Weight: 193 lb (87.5 kg)   Height: 5' 4\" (1.626 m)        Physical Exam  Constitutional:       Appearance: He is well-developed. HENT:      Head: Normocephalic and atraumatic. Eyes:      Conjunctiva/sclera: Conjunctivae normal.   Pulmonary:      Effort: Pulmonary effort is normal.   Neurological:      Mental Status: He is alert and oriented to person, place, and time. Psychiatric:         Attention and Perception: Attention and perception normal.         Mood and Affect: Mood normal.         Speech: Speech normal.         Behavior: Behavior normal. Behavior is cooperative. Thought Content:  Thought content normal.         Cognition and Memory: Cognition and memory normal.         Judgment: Judgment normal.           Lab Results   Component Value Date    LABA1C 5.6 09/16/2021    LABA1C 5.9 02/13/2020     Lab Results   Component Value Date    CREATININE 1.4 (H) 12/28/2021     Lab Results   Component Value Date    ALT 17 09/09/2021    AST 19 09/09/2021     Lab Results   Component Value Date    CHOL 154 09/09/2021    TRIG 141 09/09/2021    HDL 58 09/09/2021    LDLCALC 68 09/09/2021        Assessment & Plan   Visit Diagnoses and Associated Orders     Coronary artery disease involving native coronary artery of native heart without angina pectoris    -  Primary    Stable and well-controlled on current meds. Exercising, watching diet. Encouraged less ETOH. No angina. Hypotensive - taper off Imdur. Hypotension due to drugs        Possibly due to improved med adherence and high dose of imdur. Taper off. Close f/u. Essential hypertension        See above. Cont to monitor. Reassess in two months         Memory change        Suspect TBI and mood disorder, possibly PTSD r/t war experience and previous cancer treatment in setting of CA recurrence. Expect SEs from Lupron. Defers tx         Malignant tumor of prostate ([facility] [address]        improving, fair to poor control. radX completed. Has appropriate f/u. Lupron started. Pure hypercholesterolemia        follow labs                 Reviewed with the patient: all disease processes, current clinical status, medications, activities and diet.      Side effects, adverse effects of the medication prescribed today, as well as treatment plan/ rationale and result expectations have been discussed with the patient who expresses understanding and desires to proceed.     Close follow up to evaluate treatment results and for coordination of care. I have reviewed the patient's medical history in detail and updated the computerized patient record. More than 50% of the appointment was spent in face-to-face counseling, education and care coordination. Please note this report has been partially produced using speech recognition software and may contain mistakes related to that system including errors in grammar, punctuation and spelling as well as words and phrases that may seem inappropriate.  If there are questions or concerns, please feel free to contact me to clarify. No orders of the defined types were placed in this encounter. No orders of the defined types were placed in this encounter. Medications Discontinued During This Encounter   Medication Reason    Simethicone (GAS RELIEF) 180 MG CAPS Therapy completed     Return in about 2 months (around 6/18/2022) for memory change, mood, knee, HTN - O. Controlled Substance Monitoring:    Acute and Chronic Pain Monitoring:   No flowsheet data found.         Kingston Hansen MD

## 2022-04-18 NOTE — PROGRESS NOTES
No chest pain, pressure or tightness, dizziness, headache, vision changes, palpitations, swelling in feet/legs. No fevers, chills, sweats. No unintended weight loss. No abdominal pain, nausea, vomiting, diarrhea, constipation, bloody stools, black tarry stools. No rashes. No swollen glands.

## 2022-04-18 NOTE — PATIENT INSTRUCTIONS
Personalized Preventive Plan for Kiley Reed - 4/18/2022  Medicare offers a range of preventive health benefits. Some of the tests and screenings are paid in full while other may be subject to a deductible, co-insurance, and/or copay. Some of these benefits include a comprehensive review of your medical history including lifestyle, illnesses that may run in your family, and various assessments and screenings as appropriate. After reviewing your medical record and screening and assessments performed today your provider may have ordered immunizations, labs, imaging, and/or referrals for you. A list of these orders (if applicable) as well as your Preventive Care list are included within your After Visit Summary for your review. Other Preventive Recommendations:    · A preventive eye exam performed by an eye specialist is recommended every 1-2 years to screen for glaucoma; cataracts, macular degeneration, and other eye disorders. · A preventive dental visit is recommended every 6 months. · Try to get at least 150 minutes of exercise per week or 10,000 steps per day on a pedometer . · Order or download the FREE \"Exercise & Physical Activity: Your Everyday Guide\" from [employer]. Call 0-673.198.4834 or search [employer] online. · You need 5261-4656 mg of calcium and 3696-8784 IU of vitamin D per day. It is possible to meet your calcium requirement with diet alone, but a vitamin D supplement is usually necessary to meet this goal.  · When exposed to the sun, use a sunscreen that protects against both UVA and UVB radiation with an SPF of 30 or greater. Reapply every 2 to 3 hours or after sweating, drying off with a towel, or swimming. · Always wear a seat belt when traveling in a car. Always wear a helmet when riding a bicycle or motorcycle. Heart-Healthy Diet   Sodium, Fat, and Cholesterol Controlled Diet       What Is a Heart Healthy Diet?    A heart-healthy diet is one that limits sodium , certain types of fat , and cholesterol . This type of diet is recommended for:   People with any form of cardiovascular disease (eg, coronary heart disease , peripheral vascular disease , previous heart attack , previous stroke )   People with risk factors for cardiovascular disease, such as high blood pressure , high cholesterol , or diabetes   Anyone who wants to lower their risk of developing cardiovascular disease   Sodium    Sodium is a mineral found in many foods. In general, most people consume much more sodium than they need. Diets high in sodium can increase blood pressure and lead to edema (water retention). On a heart-healthy diet, you should consume no more than 2,300 mg (milligrams) of sodium per dayabout the amount in one teaspoon of table salt. The foods highest in sodium include table salt (about 50% sodium), processed foods, convenience foods, and preserved foods. Cholesterol    Cholesterol is a fat-like, waxy substance in your blood. Our bodies make some cholesterol. It is also found in animal products, with the highest amounts in fatty meat, egg yolks, whole milk, cheese, shellfish, and organ meats. On a heart-healthy diet, you should limit your cholesterol intake to less than 200 mg per day. It is normal and important to have some cholesterol in your bloodstream. But too much cholesterol can cause plaque to build up within your arteries, which can eventually lead to a heart attack or stroke. The two types of cholesterol that are most commonly referred to are:   Low-density lipoprotein (LDL) cholesterol  Also known as bad cholesterol, this is the cholesterol that tends to build up along your arteries. Bad cholesterol levels are increased by eating fats that are saturated or hydrogenated. Optimal level of this cholesterol is less than 100. Over 130 starts to get risky for heart disease.    High-density lipoprotein (HDL) cholesterol  Also known as good cholesterol, this type of cholesterol actually carries cholesterol away from your arteries and may, therefore, help lower your risk of having a heart attack. You want this level to be high (ideally greater than 60). It is a risk to have a level less than 40. You can raise this good cholesterol by eating olive oil, canola oil, avocados, or nuts. Exercise raises this level, too. Fat    Fat is calorie dense and packs a lot of calories into a small amount of food. Even though fats should be limited due to their high calorie content, not all fats are bad. In fact, some fats are quite healthful. Fat can be broken down into four main types. The good-for-you fats are:   Monounsaturated fat  found in oils such as olive and canola, avocados, and nuts and natural nut butters; can decrease cholesterol levels, while keeping levels of HDL cholesterol high   Polyunsaturated fat  found in oils such as safflower, sunflower, soybean, corn, and sesame; can decrease total cholesterol and LDL cholesterol   Omega-3 fatty acids  particularly those found in fatty fish (such as salmon, trout, tuna, mackerel, herring, and sardines); can decrease risk of arrhythmias, decrease triglyceride levels, and slightly lower blood pressure   The fats that you want to limit are:   Saturated fat  found in animal products, many fast foods, and a few vegetables; increases total blood cholesterol, including LDL levels   Animal fats that are saturated include: butter, lard, whole-milk dairy products, meat fat, and poultry skin   Vegetable fats that are saturated include: hydrogenated shortening, palm oil, coconut oil, cocoa butter   Hydrogenated or trans fat  found in margarine and vegetable shortening, most shelf stable snack foods, and fried foods; increases LDL and decreases HDL     It is generally recommended that you limit your total fat for the day to less than 30% of your total calories.  If you follow an 1800-calorie heart healthy diet, for example, this would mean 60 grams of fat or less per day. Saturated fat and trans fat in your diet raises your blood cholesterol the most, much more than dietary cholesterol does. For this reason, on a heart-healthy diet, less than 7% of your calories should come from saturated fat and ideally 0% from trans fat. On an 1800-calorie diet, this translates into less than 14 grams of saturated fat per day, leaving 46 grams of fat to come from mono- and polyunsaturated fats.    Food Choices on a Heart Healthy Diet   Food Category   Foods Recommended   Foods to Avoid   Grains   Breads and rolls without salted tops Most dry and cooked cereals Unsalted crackers and breadsticks Low-sodium or homemade breadcrumbs or stuffing All rice and pastas   Breads, rolls, and crackers with salted tops High-fat baked goods (eg, muffins, donuts, pastries) Quick breads, self-rising flour, and biscuit mixes Regular bread crumbs Instant hot cereals Commercially prepared rice, pasta, or stuffing mixes   Vegetables   Most fresh, frozen, and low-sodium canned vegetables Low-sodium and salt-free vegetable juices Canned vegetables if unsalted or rinsed   Regular canned vegetables and juices, including sauerkraut and pickled vegetables Frozen vegetables with sauces Commercially prepared potato and vegetable mixes   Fruits   Most fresh, frozen, and canned fruits All fruit juices   Fruits processed with salt or sodium   Milk   Nonfat or low-fat (1%) milk Nonfat or low-fat yogurt Cottage cheese, low-fat ricotta, cheeses labeled as low-fat and low-sodium   Whole milk Reduced-fat (2%) milk Malted and chocolate milk Full fat yogurt Most cheeses (unless low-fat and low salt) Buttermilk (no more than 1 cup per week)   Meats and Beans   Lean cuts of fresh or frozen beef, veal, lamb, or pork (look for the word loin) Fresh or frozen poultry without the skin Fresh or frozen fish and some shellfish Egg whites and egg substitutes (Limit whole eggs to three per week) Tofu Nuts or seeds (unsalted, dry-roasted), low-sodium peanut butter Dried peas, beans, and lentils   Any smoked, cured, salted, or canned meat, fish, or poultry (including huffman, chipped beef, cold cuts, hot dogs, sausages, sardines, and anchovies) Poultry skins Breaded and/or fried fish or meats Canned peas, beans, and lentils Salted nuts   Fats and Oils   Olive oil and canola oil Low-sodium, low-fat salad dressings and mayonnaise   Butter, margarine, coconut and palm oils, huffman fat   Snacks, Sweets, and Condiments   Low-sodium or unsalted versions of broths, soups, soy sauce, and condiments Pepper, herbs, and spices; vinegar, lemon, or lime juice Low-fat frozen desserts (yogurt, sherbet, fruit bars) Sugar, cocoa powder, honey, syrup, jam, and preserves Low-fat, trans-fat free cookies, cakes, and pies Solomon and animal crackers, fig bars, brianna snaps   High-fat desserts Broth, soups, gravies, and sauces, made from instant mixes or other high-sodium ingredients Salted snack foods Canned olives Meat tenderizers, seasoning salt, and most flavored vinegars   Beverages   Low-sodium carbonated beverages Tea and coffee in moderation Soy milk   Commercially softened water   Suggestions   Make whole grains, fruits, and vegetables the base of your diet. Choose heart-healthy fats such as canola, olive, and flaxseed oil, and foods high in heart-healthy fats, such as nuts, seeds, soybeans, tofu, and fish. Eat fish at least twice per week; the fish highest in omega-3 fatty acids and lowest in mercury include salmon, herring, mackerel, sardines, and canned chunk light tuna. If you eat fish less than twice per week or have high triglycerides, talk to your doctor about taking fish oil supplements. Read food labels.    For products low in fat and cholesterol, look for fat free, low-fat, cholesterol free, saturated fat free, and trans fat freeAlso scan the Nutrition Facts Label, which lists saturated fat, trans fat, and cholesterol amounts. For products low in sodium, look for sodium free, very low sodium, low sodium, no added salt, and unsalted   Skip the salt when cooking or at the table; if food needs more flavor, get creative and try out different herbs and spices. Garlic and onion also add substantial flavor to foods. Trim any visible fat off meat and poultry before cooking, and drain the fat off after wesley. Use cooking methods that require little or no added fat, such as grilling, boiling, baking, poaching, broiling, roasting, steaming, stir-frying, and sauting. Avoid fast food and convenience food. They tend to be high in saturated and trans fat and have a lot of added salt. Talk to a registered dietitian for individualized diet advice. Last Reviewed: March 2011 Shelly Goodwin MS, MPH, RD   Updated: 3/29/2011   ·     Alcohol Abuse and Alcoholism   (Alcohol Dependence; Alcohol Use Disorder)       Definition   Alcohol abuse is excessive or problematic alcohol consumption. It can progress to alcoholism. Alcoholism is chronic alcohol abuse that results in a physical dependence on alcohol (withdrawal symptoms) and an inability to stop or limit drinking. Causes   Several factors can contribute to alcohol abuse and alcoholism, including:   Genes   Brain chemicals that may be different than normal   Social pressure   Emotional stress   Pain   Depression and other mental health problems   Problem drinking behaviors learned from family or friends   Risk Factors   These factors increase your chance of developing alcoholism.  Tell your doctor if you have any of these risk factors:   Sex: male   Family members who abuse alcohol (especially men whose fathers or brothers are alcoholic)   Starting to use alcohol at an early age (younger than 15)   Using illicit drugs or non-medical use of prescription drugs   Peer pressure   Easy access to alcoholic beverages   Psychiatric disorders, such as depression or anxiety Smoking   Symptoms   It is common to deny an alcohol problem. Alcohol abuse can occur without physical dependence. Alcohol abuse symptoms include:   Repeated work, school, or home problems due to drinking   Risking physical safety   Recurring trouble with the law, often including drinking and driving   Continuing to drink despite alcohol-related difficulties   Symptoms of alcoholism include:   Craving a drink   Unable to stop or limit drinking   Needing greater amounts of alcohol to feel the same effect   Giving up activities in order to drink or recover from alcohol   Drinking that continues even when it causes or worsens health problems   Wanting to stop or reduce drinking, but not being able   Withdrawal symptoms if alcohol is stopped include:   Nausea   Sweating   Shaking   Anxiety   Increased blood pressure   Seizures ( delirium tremens [DTs])   The brain, nervous system, heart, liver, stomach, gastrointestinal tract, and pancreas can all be damaged by alcoholism. Some of the Organs Damaged in Alcohol Abuse        2011 [address] [place] [address].   Diagnosis   Doctors ask a series of questions to assess possible alcohol-related problems, including:   Have you tried to reduce your drinking? Have you felt bad about drinking? Have you been annoyed by another person's criticism of your drinking? Do you drink in the morning to steady your nerves or cure a hangover? Do you have problems with a job, your family, or the law? Do you drive under the influence of alcohol? Blood tests may be done to:   Look at the size of your red blood cells and to check for a substance called carbohydrate-deficient transferrin   Check for alcohol-related liver disease and other health problems   Treatment   Treatment for alcohol abuse or dependence is aimed at teaching patients how to manage the disease. Most professionals believe that this means giving up alcohol completely and permanently.    The first and most important step is recognizing a problem exists. Successful treatment depends on your desire to change. Your doctor can help you withdraw from alcohol safely. This could require hospitalization in a detoxification center. They will carefully monitor you for side effects. You may need medication while you are undergoing detoxification. Treatments include:   Medications    Drugs can help relieve some of the symptoms of withdrawal and help prevent relapse. The doctor may prescribe medication to reduce cravings for alcohol. Medications used to treat alcoholism and to try to prevent drinking include:   Naltrexone (ReVia, Vivitrol)blocks the high that makes you crave alcohol   Disulfiram (Antabuse)makes you very sick if you drink alcohol   Acamprosate (Campral)reduces your craving for alcohol   A study showed that an anticonvulsant drug, topiramate (Topamax), may reduce alcohol dependence. Education and Counseling    Therapy helps you to recognize alcohol's dangers. Education raises awareness of underlying issues and lifestyles that promote drinking. In therapy, you work to improve coping skills and learn other ways of dealing with stress or pain. Mentoring and Community Help    Alcoholics Anonymous (AA) helps many people to stop drinking and stay sober. Members meet regularly and support each other. Your family members may also benefit from attending meetings of [place]. Living with an alcoholic can be a painful, stressful situation. Here are some general statistics on treatment outcomes of individuals one year after attempting to stop drinkin/3 remained abstinent   1/3 resumed drinking but at a lower level   1/3 relapsed completely   If you are diagnosed with alcohol abuse or alcoholism, follow your doctor's instructions . Prevention   Realizing that alcohol causes problems helps some people avoid it. Suggestions to decrease the risk of alcohol abuse and dependence include:   Socialize without alcohol. Avoid going to bars. Do not keep alcohol in your home. Avoid situations and people that encourage drinking. Make new nondrinking friends. Do fun things that do not involve alcohol. Avoid reaching for a drink when stressed or upset. Limit your alcohol intake to a moderate level. Moderate is two or fewer drinks per day for men and one or fewer for women and older adults   A 12-ounce bottle of beer, a five-ounce glass of wine, or 1.5 ounces of liquor is considered one drink   If you are a parent, having a good relationship with your children may reduce their risk of alcohol abuse. Most professionals who treat alcohol abuse and dependence believe that complete abstinence is the only effective prevention. Last Reviewed: September 2010 Joseph Mcneill MD, PhD, MPH   Updated: 9/20/2010   ·     High-Fiber Diet     What Is Fiber? Dietary fiber is a form of carbohydrate found in plants that cannot be digested by humans. All plants contain fiber, including fruits, vegetables, grains, and legumes. Fiber is often classified into two categories: soluble and insoluble. Soluble fiber draws water into the bowel and can help slow digestion. Examples of foods that are high in soluble fiber include oatmeal, oat bran, barley, legumes (eg, beans and peas), apples, and strawberries. Insoluble fiber speeds digestion and can add bulk to the stool. Examples of foods that are high in insoluble fiber include whole-wheat products, wheat bran, cauliflower, green beans, and potatoes. Why Follow a High-Fiber Diet? A high-fiber diet is often recommended to prevent and treat constipation , hemorrhoids , diverticulitis , and irritable bowel syndrome . Eating a high-fiber diet can also help improve your cholesterol levels, lower your risk of coronary heart disease , reduce your risk of type 2 diabetes , and lower your weight.  For people with type 1 or 2 diabetes, a high-fiber diet can also help stabilize blood sugar levels. How Much Fiber Should I Eat? A high-fiber diet should contain  20-35 grams  of fiber a day. This is actually the amount recommended for the general adult population; however, most Americans eat only 15 grams of fiber per day. Digestion of Fiber   Eating a higher fiber diet than usual can take some getting used to by your body's digestive system. To avoid the side effects of sudden increases in dietary fiber (eg, gas, cramping, bloating, and diarrhea), increase fiber gradually and be sure to drink plenty of fluids every day. Tips for Increasing Fiber Intake   Whenever possible, choose whole grains over refined grains (eg, brown rice instead of white rice, whole-wheat bread instead of white bread). Include a variety of grains in your diet, such as wheat, rye, barley, oats, quinoa, and bulgur. Eat more vegetarian-based meals. Here are some ideas: black bean burgers, eggplant lasagna, and veggie tofu stir-parks. Choose high-fiber snacks, such as fruits, popcorn, whole-grain crackers, and nuts. Make whole-grain cereal or whole-grain toast part of your daily breakfast regime. When eating out, whether ordering a sandwich or dinner, ask for extra vegetables. When baking, replace part of the white flour with whole-wheat flour. Whole-wheat flour is particularly easy to incorporate into a recipe. High-Fiber Diet Eating Guide   Food Category   Foods Recommended   Notes   Grains   Whole-grain breads, muffins, bagels, or esther bread Rye bread Whole-wheat crackers or crisp breads Whole-grain or bran cereals Oatmeal, oat bran, or grits Wheat germ Whole-wheat pasta and brown rice   Read the ingredients list on food labels. Look for products that list \"whole\" as the first ingredient (eg, whole-wheat, whole oats). Choose cereals with at least 2 grams of fiber per serving.    Vegetables   All vegetables, especially asparagus, bean sprouts, broccoli, [place] sprouts, cabbage, carrots, cauliflower, celery, corn, greens, green beans, green pepper, onions, peas, potatoes (with skin), snow peas, spinach, squash, sweet potatoes, tomatoes, zucchini   For maximum fiber intake, eat the peels of fruits and vegetablesjust be sure to wash them well first.   Fruits   All fruits, especially apples, berries, grapefruits, mangoes, nectarines, oranges, peaches, pears, dried fruits (figs, dates, prunes, raisins)   Choose raw fruits and vegetables over juice, cooked, or cannedraw fruit has more fiber. Dried fruit is also a good source of fiber. Milk   With the exception of yogurt containing inulin (a type of fiber), dairy foods provide little fiber. Add more fiber by topping your yogurt or cottage cheese with fresh fruit, whole grain or bran cereals, nuts, or seeds. Meats and Beans   All beans and peas, especially Garbanzo beans, kidney beans, lentils, lima beans, split peas, and crews beans All nuts and seeds, especially almonds, peanuts, [place] nuts, cashews, peanut butter, walnuts, sesame and sunflower seeds All meat, poultry, fish, and eggs   Increase fiber in meat dishes by adding crews beans, kidney beans, black-eyed peas, bran, or oatmeal. If you are following a low-fat diet, use nuts and seeds only in moderation. Fats and Oils   All in moderation   Fats and oils do not provide fiber   Snacks, Sweets, and Condiments   Fruit Nuts Popcorn, whole-wheat pretzels, or trail mix made with dried fruits, nuts, and seeds Cakes, breads, and cookies made with oatmeal or whole-wheat flour   Most snack foods do not provide much fiber. Choose snacks with at least 2 grams of fiber per serving. Last Reviewed: March 2011 Carter Hoffmann MS, MPH, RD   Updated: 3/29/2011   ·     Keep Your Memory Maurizio Edward       Many factors can affect your ability to remembera hectic lifestyle, aging, stress, chronic disease, and certain medicines. But, there are steps you can take to sharpen your mind and help preserve your memory.    Challenge Your Brain   Regularly challenging your mind may help keeps it in top shape. Good mental exercises include:   Crossword puzzlesUse a dictionary if you need it; you will learn more that way. Brainteasers Try some! Crafts, such as wood working and sewing   Hobbies, such as gardening and building model airplanes   SocializingVisit old friends or join groups to meet new ones. Reading   Learning a new language   Taking a class, whether it be art history or anabel chi   TravelingExperience the food, history, and culture of your destination   Learning to use a computer   Going to museums, the theater, or thought-provoking movies   Changing things in your daily life, such as reversing your pattern in the grocery store or brushing your teeth using your nondominant hand   Use Memory Aids   There is no need to remember every detail on your own. These memory aids can help:   Calendars and day planners   Electronic organizers to store all sorts of helpful informationThese devices can \"beep\" to remind you of appointments. A book of days to record birthdays, anniversaries, and other occasions that occur on the same date every year   Detailed \"to-do\" lists and strategically placed sticky notes   Quick \"study\" sessionsBefore a gathering, review who will be there so their names will be fresh in your mind. Establish routinesFor example, keep your keys, wallet, and umbrella in the same place all the time or take medicine with your 8:00 AM glass of juice   Live a Healthy Life   Many actions that will keep your body strong will do the same for your mind. For example:   Talk to Your Doctor About Herbs and Supplements    Malnutrition and vitamin deficiencies can impair your mental function. For example, vitamin B12 deficiency can cause a range of symptoms, including confusion. But, what if your nutritional needs are being met? Can herbs and supplements still offer a benefit?  Researchers have investigated a range of natural remedies, such as ginkgo , ginseng , and the supplement phosphatidylserine (PS). So far, though, the evidence is inconsistent as to whether these products can improve memory or thinking. If you are interested in taking herbs and supplements, talk to your doctor first because they may interact with other medicines that you are taking. Exercise Regularly    Among the many benefits of regular exercise are increased blood flow to the brain and decreased risk of certain diseases that can interfere with memory function. One study found that even moderate exercise has a beneficial effect. Examples of \"moderate\" exercise include:   Playing 18 holes of golf once a week, without a cart   Playing tennis twice a week   Walking one mile per day   Manage Stress    It can be tough to remember what is important when your mind is cluttered. Make time for relaxation. Choose activities that calm you down, and make it routine. Manage Chronic Conditions    Side effects of high blood pressure , diabetes, and heart disease can interfere with mental function. Many of the lifestyle steps discussed here can help manage these conditions. Strive to eat a healthy diet, exercise regularly, get stress under control, and follow your doctor's advice for your condition. Minimize Medications    Talk to your doctor about the medicines that you take. Some may be unnecessary. Also, healthy lifestyle habits may lower the need for certain drugs. Last Reviewed: April 2010 Mayi Edmonds MD   Updated: 4/13/2010   ·     [address] [address] a [facility]       As we get older, changes in balance, gait, strength, vision, hearing, and cognition make even the most youthful senior more prone to accidents. Falls are one of the leading health risks for older people.  This increased risk of falling is related to:   Aging process (eg, decreased muscle strength, slowed reflexes)   Higher incidence of chronic health problems (eg, arthritis, diabetes) that may limit mobility, agility or sensory awareness   Side effects of medicine (eg, dizziness, blurred vision)especially medicines like prescription pain medicines and drugs used to treat mental health conditions   Depending on the brittleness of your bones, the consequences of a fall can be serious and long lasting. Home Life   Research by the Association of Aging [facility]) shows that some home accidents among older adults can be prevented by making simple lifestyle changes and basic modifications and repairs to the home environment. Here are some lifestyle changes that experts recommend:   Have your hearing and vision checked regularly. Be sure to wear prescription glasses that are right for you. Speak to your doctor or pharmacist about the possible side effects of your medicines. A number of medicines can cause dizziness. If you have problems with sleep, talk to your doctor. Limit your intake of alcohol. If necessary, use a cane or walker to help maintain your balance. Wear supportive, rubber-soled shoes, even at home. If you live in a region that gets wintry weather, you may want to put special cleats on your shoes to prevent you from slipping on the snow and ice. Exercise regularly to help maintain muscle tone, agility, and balance. Always hold the banister when going up or down stairs. Also, use  bars when getting in or out of the bath or shower, or using the toilet. To avoid dizziness, get up slowly from a lying down position. Sit up first, dangling your legs for a minute or two before rising to a standing position. Overall Home Safety Check   According to the Consumer Product Safety Commision's \"Older Consumer Home Safety Checklist,\" it is important to check for potential hazards in each room. And remember, proper lighting is an essential factor in home safety. If you cannot see clearly, you are more likely to fall.    Important questions to ask yourself include:   Are lamp, electric, extension, and telephone cords placed out of the flow of traffic and maintained in good condition? Have frayed cords been replaced? Are all small rugs and runners slip resistant? If not, you can secure them to the floor with a special double-sided carpet tape. Are smoke detectors properly locatedone on every floor of your home and one outside of every sleeping area? Are they in good working order? Are batteries replaced at least once a year? Do you have a well-maintained carbon monoxide detector outside every sleeping are in your home? Does your furniture layout leave plenty of space to maneuver between and around chairs, tables, beds, and sofas? Are hallways, stairs and passages between rooms well lit? Can you reach a lamp without getting out of bed? Are floor surfaces well maintained? Shag rugs, high-pile carpeting, tile floors, and polished wood floors can be particularly slippery. Stairs should always have handrails and be carpeted or fitted with a non-skid tread. Is your telephone easily reachable. Is the cord safely tucked away? Room by Room   According to the Association of Aging, bathrooms and bryce are the two most potentially hazardous rooms in your home. In the Kitchen    Be sure your stove is in proper working order and always make sure burners and the oven are off before you go out or go to sleep. Keep pots on the back burners, turn handles away from the front of the stove, and keep stove clean and free of grease build-up. Kitchen ventilation systems and range exhausts should be working properly. Keep flammable objects such as towels and pot holders away from the cooking area except when in use. Make sure kitchen curtains are tied back. Move cords and appliances away from the sink and hot surfaces. If extension cords are needed, install wiring guides so they do not hang over the sink, range, or working areas. Look for coffee pots, kettles and toaster ovens with automatic shut-offs.     Keep a mop handy in the kitchen so you can wipe up spills instantly. You should also have a small fire extinguisher. Arrange your kitchen with frequently used items on lower shelves to avoid the need to stand on a stepstool to reach them. Make sure countertops are well-lit to avoid injuries while cutting and preparing food. In the Bathroom    Use a non-slip mat or decals in the tub and shower, since wet, soapy tile or porcelain surfaces are extremely slippery. Make sure bathroom rugs are non-skid or tape them firmly to the floor. Bathtubs should have at least one, preferably two, grab bars, firmly attached to structural supports in the wall. (Do not use built-in soap holders or glass shower doors as grab bars.)    Tub seats fitted with non-slip material on the legs allow you to wash sitting down. For people with limited mobility, bathtub transfer benches allow you to slide safely into the tub. Raised toilet seats and toilet safety rails are helpful for those with knee or hip problems. In the [facility]    Make sure you use a nightlight and that the area around your bed is clear of potential obstacles. Be careful with electric blankets and never go to sleep with a heating pad, which can cause serious burns even if on a low setting. Use fire-resistant mattress covers and pillows, and NEVER smoke in bed. Keep a phone next to the bed that is programmed to dial 911 at the push of a button. If you have a chronic condition, you may want to sign on with an automatic call-in service. Typically the system includes a small pendant that connects directly to an emergency medical voice-response system. You should also make arrangements to stay in contact with someonefriend, neighbor, family memberon a regular schedule.    Fire Prevention   According to the [employer]. (Smoke Alarms for Every) [address] [place] [address], senior citizens are one of the two highest risk groups for death and serious injuries due to residential fires. When cooking, wear short-sleeved items, never a bulky long-sleeved robe. The [facility]'s Safety Checklist for Older Consumers emphasizes the importance of checking basements, garages, workshops and storage areas for fire hazards, such as volatile liquids, piles of old rags or clothing and overloaded circuits. Never smoke in bed or when lying down on a couch or recliner chair. Small portable electric or kerosene heaters are responsible for many home fires and should be used cautiously if at all. If you do use one, be sure to keep them away from flammable materials. In case of fire, make sure you have a pre-established emergency exit plan. Have a professional check your fireplace and other fuel-burning appliances yearly. Helping Hands   Baby boomers entering the catalan years will continue to see the development of new products to help older adults live safely and independently in spite of age-related changes. Making Life More Livable  , by Salina Hew[employer], lists over 1,000 products for \"living well in the mature years,\" such as bathing and mobility aids, household security devices, ergonomically designed knives and peelers, and faucet valves and knobs for temperature control. Medical supply stores and organizations are good sources of information about products that improve your quality of life and insure your safety. Last Reviewed: November 2009 Jefe Moralez MD   Updated: 3/7/2011     ·        Advance Care Planning: Care Instructions  Your Care Instructions     It can be hard to live with an illness that cannot be cured. But if your health is getting worse, you may want to make decisions about end-of-life care. Planning for the end of your life does not mean that you are giving up. It is a way to make sure that your wishes are met. Clearly stating your wishes can make it easier for your loved ones.  Making plans while you are still able may alsoease your mind and make your final days less stressful and more meaningful. Follow-up care is a key part of your treatment and safety. Be sure to make and go to all appointments, and call your doctor if you are having problems. It's also a good idea to know your test results and keep alist of the medicines you take. What can you do to plan for the end of life? You can bring these issues up with your doctor. You do not need to wait until your doctor starts the conversation. You might start with, \"What makes life worth living for me is. Glenroy Velazquez \" And then follow it with, \"I would not be willing to live with . Glenroy Velazquez \" When you complete this sentence it helps your doctor understand your wishes. Talk openly and honestly with your doctor. This is the best way to understand the decisions you will need to make as your health changes. Know that you can always change your mind. Ask your doctor about commonly used life-support measures. These include tube feedings, breathing machines, and fluids given through a vein (IV). Understanding these treatments will help you decide whether you want them. You may choose to have these life-supporting treatments for a limited time. This allows a trial period to see whether they will help you. You may also decide that you want your doctor to take only certain measures to keep you alive. It may help to think about the big picture, like what makes life worth living for you or what your values and goals are. Talk to your doctor about how long you are likely to live. Your doctor may be able to give you an idea of what usually happens with your specific illness. Think about preparing papers that state your wishes. These papers are called advance directives. If you do this early and review them often, there will not be any confusion about what you want. You can change your instructions at any time. Which papers should you prepare?   Advance directives are legal papers that tell doctors how you want to be cared for at the end of your life. You do not need a  to write these papers. Ask your doctor or your state health department for information on how to write your advance directives. They may have the forms for each of these types of papers. Make sure your doctor has a copy of these on file, and give a copy to afamily member or close friend. Consider a do-not-resuscitate order (DNR). This order asks that no extra treatments be done if your heart stops or you stop breathing. Extra treatments may include cardiopulmonary resuscitation (CPR), electrical shock to restart your heart, or a machine to breathe for you. If you decide to have a DNR order, ask your doctor to explain and write it. Place the order in your home where everyone can easily see it. Consider a living will. A living will explains your wishes about life support and other treatments at the end of your life if you become unable to speak for yourself. Living starks tell doctors to use or not use treatments that would keep you alive. You must have one or two witnesses or a notary present when you sign this form. A living will may be called something else in your state. Consider a medical power of . This form allows you to name a person to make decisions about your care if you are not able to. Most people ask a close friend or family member. Talk to this person about the kinds of treatments you want and those that you do not want. Make sure this person understands your wishes. A medical power of  may be called something else in your state. These legal papers are simple to change. Tell your doctor what you want to change, and ask him or her to make a note in your medical file. Give yourfamily updated copies of the papers. Where can you learn more? Go to https://chpeelijahewabi.Retrieve. org and sign in to your FanGo account. Enter P184 in the [facility] box to learn more about \"Advance Care Planning: Care Instructions. \"     If you do not have an account, please click on the \"Sign Up Now\" link. Current as of: October 18, 2021               Content Version: 13.2  © 2006-2022 [employer]. Care instructions adapted under license by [facility] ([facility]). If you have questions about a medical condition or this instruction, always ask your healthcare professional. [address] any warranty or liability for your use of this information. ·        Learning About Hernando Castro  What is a living will? A living will, also called a declaration, is a legal form. It tells your family and your doctor your wishes when you can't speak for yourself. It's used by the health professionals who will treat you as you near the end of your life or ifyou get seriously hurt or ill. If you put your wishes in writing, your loved ones and others will know what kind of care you want. They won't need to guess. This can ease your mind and behelpful to others. And you can change or cancel your living will at any time. A living will is not the same as an estate or property will. An estate willexplains what you want to happen with your money and property after you die. How do you use it? Keep these facts in mind about how a living will is used. Your living will is used only if you can't speak or make decisions for yourself. Most often, one or more doctors must certify that you can't speak or decide for yourself before your living will takes effect. If you get better and can speak for yourself again, you can accept or refuse any treatment. It doesn't matter what you said in your living will. Some states may limit your right to refuse treatment in certain cases. For example, you may need to clearly state in your living will that you don't want artificial hydration and nutrition, such as being fed through a tube. Is a living will a legal document? A living will is a legal document. Each state has its own laws about livingwills.  And a living will may be called something else in your state. Here are some things to know about living starks. You don't need an  to complete a living will. But legal advice can be helpful if your state's laws are unclear. It can also help if your health history is complicated or your family can't agree on what should be in your living will. You can change your living will at any time. Some people find that their wishes about end-of-life care change as their health changes. If you make big changes to your living will, complete a new form. If you move to another state, make sure that your living will is legal in the state where you now live. In most cases, doctors will respect your wishes even if you have a form from a different state. You might use a universal form that has been approved by many states. This kind of form can sometimes be filled out and stored online. Your digital copy will then be available wherever you have a connection to the internet. The doctors and nurses who need to treat you can find it right away. Your state may offer an online registry. This is another place where you can store your living will online. It's a good idea to get your living will notarized. This means using a person called a  to watch two people sign, or witness, your living will. What should you know when you create a living will? Here are some questions to ask yourself as you make your living will. Do you know enough about life support methods that might be used? If not, talk to your doctor so you know what might be done if you can't breathe on your own, your heart stops, or you can't swallow. What things would you still want to be able to do after you receive life-support methods? Would you want to be able to walk? To speak? To eat on your own? To live without the help of machines? Do you want certain Zoroastrianism practices performed if you become very ill? If you have a choice, where do you want to be cared for?  In your home? At a hospital or nursing home? If you have a choice at the end of your life, where would you prefer to die? At home? In a hospital or nursing home? Somewhere else? Would you prefer to be buried or cremated? Do you want your organs to be donated after you die? What should you do with your living will? Make sure that your family members and your health care agent have copies of your living will (also called a declaration). Give your doctor a copy of your living will. Ask to have it kept as part of your medical record. If you have more than one doctor, make sure that each one has a copy. Put a copy of your living will where it can be easily found. For example, some people may put a copy on their refrigerator door. If you are using a digital copy, be sure your doctor, family members, and health care agent know how to find and access it. Where can you learn more? Go to https://Dexin Interactivepepiceweb.[place]. org and sign in to your "[employer] account. Enter G409 in the [employer] box to learn more about \"Learning About Living Perroy. \"     If you do not have an account, please click on the \"Sign Up Now\" link. Current as of: October 18, 2021               Content Version: 13.2  © 2006-2022 Healthwise, Incorporated. Care instructions adapted under license by [facility] ([facility]). If you have questions about a medical condition or this instruction, always ask your healthcare professional. [address] [address] any warranty or liability for your use of this information. ·        Learning About Medical Power of   What is a medical power of ? A medical power of , also called a durable power of  for health care, is one type of the legal forms called advance directives. It lets you name the person you want to make treatment decisions for you if you can't speak or decide for yourself. The person you choose is called your health care agent. This person is also called a health care proxy or health care surrogate. A medical power of  may be called something else in your state. How do you choose a health care agent? Choose your health care agent carefully. This person may or may not be a familymember. Talk to the person before you make your final decision. Make sure he or she iscomfortable with this responsibility. It's a good idea to choose someone who:  Is at least 25years old. Knows you well and understands what makes life meaningful for you. Understands your Restorationist and moral values. Will do what you want, not what he or she wants. Will be able to make difficult choices at a stressful time. Will be able to refuse or stop treatment, if that is what you would want, even if you could die. Will be firm and confident with health professionals if needed. Will ask questions to get needed information. Lives near you or agrees to travel to you if needed. Your family may help you make medical decisions while you can still be part of that process. But it's important to choose one person to be your health careagent in case you aren't able to make decisions for yourself. If you don't fill out the legal form and name a health care agent, thedecisions your family can make may be limited. A health care agent may be called something else in your state. Who will make decisions for you if you don't have a health care agent? If you don't have a health care agent or a living will, you may not get the care you want. Decisions may be made by family members who disagree about your medical care. Or decisions may be made by a medical professional who doesn'tknow you well. In some cases, a  makes the decisions. When you name a health care agent, it is very clear who has the power to [place] decisions for you. How do you name a health care agent? You name your health care agent on a legal form. This form is usually called a medical power of .  Ask your [facility], state bar association, or officeon aging where to find these forms. You must sign the form to make it legal. Some states require you to get the form notarized. This means that a person called a  watches you sign the form and then he or she signs the form. Some states also require thattwo or more witnesses sign the form. Be sure to tell your family members and doctors who your health care agent is. Where can you learn more? Go to https://seniorshelf.compeScandit.Qpixel Technology. org and sign in to your Ember account. Enter [id number] in the [facility] box to learn more about \"Learning About [address]. \"     If you do not have an account, please click on the \"Sign Up Now\" link. Current as of: October 18, 2021               Content Version: 13.2  © 8604-9281 Healthwise, [facility]. Care instructions adapted under license by [facility] ([facility]). If you have questions about a medical condition or this instruction, always ask your healthcare professional. [address] [address] any warranty or liability for your use of this information.     ·

## 2022-04-18 NOTE — PROGRESS NOTES
Medicare Annual Wellness Visit    [facility]    Assessment & Plan    Recommendations for Preventive Services Due: see orders and patient instructions/AVS.  Recommended screening schedule for the next 5-10 years is provided to the patient in written form: see Patient Instructions/AVS.     No follow-ups on file. Subjective       Patient's complete Health Risk Assessment and screening values have been reviewed and are found in Flowsheets. The following problems were reviewed today and where indicated follow up appointments were made and/or referrals ordered. Positive Risk Factor Screenings with Interventions:    Fall Risk:  Do you feel unsteady or are you worried about falling? : (!) yes (sometimes)  2 or more falls in past year?: no  Fall with injury in past year?: no     Fall Risk Interventions:    · Home safety tips provided  · Patient declines any further evaluation/treatment for this issue  · related to several episodes of hypotension which has been addressed separately       Alcohol Screening:  AUDIT Total Score: 4    A score of 8 or more is associated with harmful or hazardous drinking. A score of 13 or more in women, and 15 or more in men, is likely to indicate alcohol dependence. Substance Abuse - Alcohol Interventions: significantly reduce ETOH consumption given concerns re: mood and memory. General Health and ACP:  General  In general, how would you say your health is?: Good  In the past 7 days, have you experienced any of the following: New or Increased Pain, New or Increased Fatigue, Loneliness, Social Isolation, Stress or Anger?: No  Do you get the social and emotional support that you need?: Yes  Do you have a Living Will?: Yes    Advance Directives     Power of  Living Will ACP-Advance Directive ACP-Power of     Not on File Not on File Not on File Not on File      General Health Risk Interventions:  · no intervention required.     Health Habits/Nutrition:     Physical Activity: Sufficiently Active    Days of Exercise per Week: 5 days    Minutes of Exercise per Session: 30 min     Have you lost any weight without trying in the past 3 months?: No     Have you seen the dentist within the past year?: Yes    Health Habits/Nutrition Interventions:  · Inadequate physical activity:  educational materials provided to promote increased physical activity  · Nutritional issues:  educational materials to promote weight loss provided  · Dental exam overdue:  patient encouraged to make appointment with his/her dentist     Safety:  Do you have working smoke detectors?: Yes  Do you have any tripping hazards - loose or unsecured carpets or rugs?: No  Do you have any tripping hazards - clutter in doorways, halls, or stairs?: No  Do you have either shower bars, grab bars, non-slip mats or non-slip surfaces in your shower or bathtub?: (!) No  Do all of your stairways have a railing or banister?: Yes  Do you always fasten your seatbelt when you are in a car?: Yes    Safety Interventions:  · Home safety tips provided           Objective   There were no vitals filed for this visit. There is no height or weight on file to calculate BMI. Allergies   Allergen Reactions    Iodides Nausea And Vomiting     Contrast Dye     Prior to Visit Medications    Medication Sig Taking?  Authorizing Provider   isosorbide mononitrate (IMDUR) 60 MG extended release tablet Take 2 tablets by mouth daily  Howard Ackerman DO   Simethicone (GAS RELIEF) 180 MG CAPS Take by mouth as needed  Patient not taking: Reported on 4/18/2022  Historical Provider, MD   pantoprazole (PROTONIX) 40 MG tablet TAKE 1 TABLET DAILY  Bertha Rodriguez MD   lisinopril (PRINIVIL;ZESTRIL) 10 MG tablet TAKE 1 TABLET DAILY  Bertha Rodriguez MD   psyllium (METAMUCIL) 28 % packet Take 1 packet by mouth 2 times daily Take with full glass of h20 or juice  Historical Provider, MD Magnesium 400 MG CAPS Take 1 capsule by mouth daily  Patient taking differently: Take 1 capsule by mouth nightly   Teo Santizo MD   atenolol (TENORMIN) 50 MG tablet Take 50 mg by mouth nightly   Historical Provider, MD   rosuvastatin (CRESTOR) 20 MG tablet Take 20 mg by mouth nightly   Historical Provider, MD   nitroGLYCERIN (NITROSTAT) 0.4 MG SL tablet Place 0.4 mg under the tongue every 5 minutes as needed for Chest pain up to max of 3 total doses. If no relief after 1 dose, call 911.    Historical Provider, MD   aspirin 81 MG tablet Take 81 mg by mouth daily  Historical Provider, MD   Cholecalciferol (VITAMIN D3) 1000 units TABS Take by mouth daily   Historical Provider, MD       CareTeam (Including outside providers/suppliers regularly involved in providing care):   Patient Care Team:  Teo Santizo MD as PCP - General (Family Medicine)  Teo Santizo MD as PCP - [facility] Empaneled Provider    Reviewed and updated this visit:  Tobacco  Allergies  Meds  Problems  Med Hx  Surg Hx  Soc Hx  Fam Hx

## 2022-05-05 DIAGNOSIS — I10 ESSENTIAL HYPERTENSION: Chronic | ICD-10-CM

## 2022-05-06 RX ORDER — LISINOPRIL 10 MG/1
TABLET ORAL
Qty: 90 TABLET | Refills: 3 | Status: SHIPPED | OUTPATIENT
Start: 2022-05-06

## 2022-05-06 NOTE — PROGRESS NOTES
[facility]           Radiation Oncology      [address] [facility]        [address]        Conrado Johnson: [phone]        F: [phone]       [email or web address]                   Dr. Jorge L Umana MD PhD    RADIATION TREATMENT SUMMARY NOTE     Date of Service: 5/10/2022  Patient ID: Lamberto Fitch   : 1945  MRN: [id number]   Acct Number: [de-identified]       Patient Name:  Lamberto Fitch  1945,  78 y.o., male       Referring Physician: Claudette Cardoso, [facility]  [facility],  [address] [place] [address]      PCP: Abdias Rivera MD       Diagnosis:  Local regionally recurrent prostate cancer. Stage Unknown (rcTX, cN0, cM0)       Recent History: This is a 62-year-old gentleman with a history of prostate cancer status post radical prostatectomy in  who was noted to have a rising PSA in  and was started on Lupron intermittently under the care of medical oncology with his last dose in 2020. He did have a response to this and his PSA however most recent PSA in 2021 revealed a spike to 10.53 concerning for progression of biochemically recurrent prostate cancer. Restaging scans with bone scan and CT abdomen pelvis did not reveal any overt metastatic disease however Axumin PET CT scan revealed activity within the post prostatectomy bed concerning for local regional recurrence. He went on to complete salvage radiation therapy to the post prostatectomy bed and regional lymph nodes at risk below:    Site Start TX Last [place] ED Fractions Dose Fx Dose Technique   Prostate Bed LN   22  50     36   7200 cGy  200 cGy   VMAT                 Response/Tolerance: He tolerated therapy quite well with only increase in urinary urgency and mild increase in urge incontinence. He otherwise did not have any issues with diarrhea, hematuria, or blood in the stool. Follow-up: I will see him again for a quick check in approximately 1 month.   After that time he will follow-up with us on an as-needed basis as he is currently has close follow-up with urology and his primary care physician.       Meme Faith MD PhD  Radiation Oncologist, [facility]

## 2022-05-09 ENCOUNTER — TELEPHONE (OUTPATIENT)
Dept: FAMILY MEDICINE CLINIC | Age: 77
End: 2022-05-09

## 2022-05-09 NOTE — TELEPHONE ENCOUNTER
Patient states he will be stopping his BP medications due to his low blood pressure readings. Today it was 96/54, couple of days ago was really low also range of 106/60    Will you call to discuss?     Call back [phone]

## 2022-05-10 ENCOUNTER — HOSPITAL ENCOUNTER (OUTPATIENT)
Dept: RADIATION ONCOLOGY | Age: 77
Discharge: HOME | End: 2022-05-10
Attending: RADIOLOGY
Payer: MEDICARE

## 2022-05-10 VITALS
DIASTOLIC BLOOD PRESSURE: 89 MMHG | BODY MASS INDEX: 32.85 KG/M2 | OXYGEN SATURATION: 98 % | RESPIRATION RATE: 16 BRPM | TEMPERATURE: 97.1 F | SYSTOLIC BLOOD PRESSURE: 165 MMHG | WEIGHT: 191.4 LBS | HEART RATE: 52 BPM

## 2022-05-10 PROCEDURE — 99212 OFFICE O/P EST SF 10 MIN: CPT | Performed by: RADIOLOGY

## 2022-05-10 NOTE — PROGRESS NOTES
NURSING ASSESSMENT     Date: 5/10/2022        Patient Name: Amie Rodriguez     YOB: 1945      Age:  78 y.o. MRN: [id number]       Chaperone [] Yes   [x] No      Advance Directives:   Do you currently have completed advance directives (living will)? [x] Yes   [] No         *If yes, please bring us a copy for your records. *If no, would you like info or assistance in completing advance directives (living will)? [] Yes   [x] No    Pain Score:   Pain Score (1-10):  1  Pain Location: mild headache  Pain Duration:  Occasionally  Pain Management/Control: doesn't take anything      Is pain affecting your ability to take care of yourself or move throughout your home? [] Yes   [x] No    General: Fatigue, mild, is an early riser  Patient has gained weight [] Yes   [x] No  Patient has lost weight [x] Yes   [] No  How much weight in pounds and over what length of time: weight loss of 6lb over the past 4 months, pt is eating better      Eyes (Ophthalmic): needs another eye exam, wears glasses     Skin (Dermatological): No Problems     ENT: No Problems     Respiratory: PADILLA     Cardiovascular: No Problems      Device   [] Yes   [x] No   Copy of Card Obtained [] Yes   [x] No    Gastrointestinal: No Problems    Genito-Urinary: IPSS 15, incomplete emptying, frequency, nocturia x3     Breast: No Problems     Musculoskeletal: Joint Pain and Back Pain    Neurological: Dizziness and Headaches, dizziness if gets up too fast, pt states BP runs low on occasion in the afternoon so he is taking lisinopril prn      Hematological and Lymphatic: No Problems     Endocrine: No Problems     A 10-point review of systems has been conducted and pertinent positives have been   recorded. All other review of systems are negative    Was the patient admitted during the course of treatment OR within 30 days of treatment?  no    Additional Comments: received last lupron injection on April 27, 2022

## 2022-05-10 NOTE — PROGRESS NOTES
[facility]  Radiation Oncology     [address], [address]        Date of Service: 5/10/2022     Location:  [address] [place] [address] NOTE    Patient ID:   Miah Jean  : 1945   MRN: [id number]    DIAGNOSIS:  Cancer Staging  Malignant tumor of prostate [facility])  Staging form: Prostate, AJCC 8th Edition  - Clinical: Stage Unknown (rcTX, cN0, cM0) - Signed by Natanael Ware MD on 2022    Biochemical recurrence of PCA ; sp prostatectomy   Salvage radiation therapy 72Gy/36fx with concurrent ADT completed 22 (pre treatment PSA 10)     INTERVAL HISTORY:   Miah Jean is a 78 y.o.. male who presents for follow-up the above diagnosis and treatment history. He completed salvage radiation therapy on 22. Denies any new dysuria, hematuria, frequency, urgency, stopping or starting, or other new problems with the bladder. He is not any medications for his bladder. He denies any significant diarrhea or constipation. No blood in the bowel movements. He saw medical oncology on 2022. He received his last dose of Lupron that day. PSA was 0.064 2022. He is seeing them back 6 months after that visit. He has regular follow ups with Urology as well. He is wondering if he needs to come back and see us.      AUA Score: 15 (19 before treatment)     MEDICATIONS:    Current Outpatient Medications:     lisinopril (PRINIVIL;ZESTRIL) 10 MG tablet, TAKE 1 TABLET DAILY (Patient taking differently: Pt only takes prn), Disp: 90 tablet, Rfl: 3    isosorbide mononitrate (IMDUR) 60 MG extended release tablet, Take 2 tablets by mouth daily (Patient taking differently: Take 30 mg by mouth in the morning and at bedtime ), Disp: 180 tablet, Rfl: 3    pantoprazole (PROTONIX) 40 MG tablet, TAKE 1 TABLET DAILY, Disp: 90 tablet, Rfl: 3    psyllium (METAMUCIL) 28 % packet, Take 1 packet by mouth 2 times daily Take with full glass of h20 or juice, Disp: , Rfl:     Magnesium 400 MG CAPS, Take 1 capsule by mouth daily (Patient taking differently: Take 1 capsule by mouth nightly ), Disp: 90 capsule, Rfl: 4    atenolol (TENORMIN) 50 MG tablet, Take 50 mg by mouth nightly , Disp: , Rfl:     rosuvastatin (CRESTOR) 20 MG tablet, Take 20 mg by mouth nightly , Disp: , Rfl:     nitroGLYCERIN (NITROSTAT) 0.4 MG SL tablet, Place 0.4 mg under the tongue every 5 minutes as needed for Chest pain up to max of 3 total doses. If no relief after 1 dose, call 911. , Disp: , Rfl:     aspirin 81 MG tablet, Take 81 mg by mouth daily, Disp: , Rfl:     Cholecalciferol (VITAMIN D3) 1000 units TABS, Take by mouth daily , Disp: , Rfl:     ALLERGIES:  Allergies   Allergen Reactions    Iodides Nausea And Vomiting     Contrast Dye         REVIEW OF SYSTEMS:    A full 14 point review of systems was performed and assessed and found to be negative except as noted above. PHYSICAL EXAMINATION:    CHAPERONE: Not Required    ECO Symptomatic but completely ambulatory    VITAL SIGNS: BP (!) 165/89   Pulse 52   Temp 97.1 °F (36.2 °C)   Resp 16   Wt 191 lb 6.4 oz (86.8 kg)   SpO2 98%   BMI 32.85 kg/m²   GENERAL:  General appearance is that of a well-nourished, well-developed in no apparent distress. HEENT: Normocephalic, atraumatic, EOMI, moist mucosa, no erythema. Indirect exam .  NECK:  No adenopathy or a palpable thyroid mass, trachea is midline. LYMPHATICS: No cervical, supraclavicular, or axillary adenopathy. HEART:  Normal rate and regular rhythm, normal heart sounds. LUNGS:  Pulmonary effort normal. Normal breath sounds. ABDOMEN:  Soft, nontender, non distended, and no hepatosplenomegaly. EXTREMITIES:  No edema. No calf tenderness. MSK:  No spinal tenderness. Normal ROM. NEUROLOGICAL: Alert and oriented. Strength and sensation intact bilaterally. No focal deficits.    PSYCH: Mood normal, behavior normal.  SKIN: No erythema, no desquamation. LABS:  WBC   Date Value Ref Range Status   09/09/2021 4.0 (L) 4.8 - 10.8 K/uL Final     Neutrophils Absolute   Date Value Ref Range Status   12/02/2019 2.4 1.4 - 6.5 K/uL Final     Hemoglobin   Date Value Ref Range Status   09/09/2021 14.8 14.0 - 18.0 g/dL Final     Platelets   Date Value Ref Range Status   09/09/2021 127 (L) 130 - 400 K/uL Final     No results found for: , CEA  PSA   Date Value Ref Range Status   12/06/2021 10.53 (H) 0.00 - 4.00 ng/mL Final   06/10/2021 0.21 0.00 - 6.22 ng/mL Final     Comment:     When the Total PSA is between 3.00 and 10.00 ng/mL, consider  requesting a Free PSA to aid in diagnosis. 12/10/2020 0.11 0.00 - 6.22 ng/mL Final   05/04/2020 0.30 0.00 - 6.22 ng/mL Final       IMAGING:   None new     Labs: PSA 0.064 4/2022      ASSESSMENT AND PLAN:  Kenneth Aguirre is a 78 y.o. male with a Cancer Staging  Malignant tumor of prostate ([address]  Staging form: Prostate, AJCC 8th Edition  - Clinical: Stage Unknown (rcTX, cN0, cM0) - Signed by Yoni Perez MD on 2/6/2022  .  44-year-old male with biochemical recurrence of prostate cancer status post radical prostatectomy in 2003, recurrence discovered in 2016, and most recently salvage radiation therapy with concurrent ADT completed 4/7/2022 who is healing well from acute toxicities of radiation therapy and presents for follow-up. He is doing well and not having any problems with his bladder or bowels. He would like to continue follow-up with medical oncology and urology at this time. I told him I think that is fine as long as he's getting a PSA checked every 3 to 4 months for the time being. We will see him back on an as-needed basis and he is to call at anytime with any questions or concerns may arise in the future. Patient was in agreement with my recommendations. All questions were answered to their satisfaction. Patient was advised to contact us anytime should they have any questions or concerns. Electronically signed by Mayo Carrasquillo MD on 5/10/2022 at 8:49 AM        Medications Prescribed:   New Prescriptions    No medications on file       Orders: No orders of the defined types were placed in this encounter.       CC:  Patient Care Team:  Saad Duong MD as PCP - General (Family Medicine)  Saad Duong MD as PCP - [facility]

## 2022-05-10 NOTE — TELEPHONE ENCOUNTER
I definitely wanted him to taper off the isosorbide (Imdur). Is he off that now? I would stop lisinopril last because it actually protects the heart and kidneys so has  multiple uses).

## 2022-06-08 DIAGNOSIS — C61 PROSTATE CANCER (HCC): ICD-10-CM

## 2022-06-08 LAB — PROSTATE SPECIFIC ANTIGEN: 0.03 NG/ML (ref 0–4)

## 2022-06-20 ENCOUNTER — OFFICE VISIT (OUTPATIENT)
Dept: FAMILY MEDICINE CLINIC | Age: 77
End: 2022-06-20
Payer: MEDICARE

## 2022-06-20 ENCOUNTER — OFFICE VISIT (OUTPATIENT)
Dept: UROLOGY | Age: 77
End: 2022-06-20
Payer: MEDICARE

## 2022-06-20 VITALS
HEIGHT: 64 IN | HEART RATE: 53 BPM | DIASTOLIC BLOOD PRESSURE: 88 MMHG | BODY MASS INDEX: 32.44 KG/M2 | SYSTOLIC BLOOD PRESSURE: 130 MMHG | WEIGHT: 190 LBS

## 2022-06-20 VITALS
BODY MASS INDEX: 32.78 KG/M2 | HEIGHT: 64 IN | HEART RATE: 54 BPM | SYSTOLIC BLOOD PRESSURE: 122 MMHG | OXYGEN SATURATION: 99 % | WEIGHT: 192 LBS | TEMPERATURE: 96.9 F | DIASTOLIC BLOOD PRESSURE: 82 MMHG

## 2022-06-20 DIAGNOSIS — E78.00 PURE HYPERCHOLESTEROLEMIA: ICD-10-CM

## 2022-06-20 DIAGNOSIS — C61 PROSTATE CANCER (HCC): Primary | ICD-10-CM

## 2022-06-20 DIAGNOSIS — I10 ESSENTIAL HYPERTENSION: Primary | ICD-10-CM

## 2022-06-20 DIAGNOSIS — I25.10 CORONARY ARTERY DISEASE INVOLVING NATIVE CORONARY ARTERY OF NATIVE HEART WITHOUT ANGINA PECTORIS: ICD-10-CM

## 2022-06-20 DIAGNOSIS — I95.2 HYPOTENSION DUE TO DRUGS: ICD-10-CM

## 2022-06-20 DIAGNOSIS — K22.719 BARRETT'S ESOPHAGUS WITH DYSPLASIA: Chronic | ICD-10-CM

## 2022-06-20 DIAGNOSIS — N18.31 STAGE 3A CHRONIC KIDNEY DISEASE (HCC): ICD-10-CM

## 2022-06-20 DIAGNOSIS — F10.10 ALCOHOL USE DISORDER, MILD, ABUSE: ICD-10-CM

## 2022-06-20 PROBLEM — N18.30 CHRONIC RENAL DISEASE, STAGE III (HCC): Status: ACTIVE | Noted: 2022-06-20

## 2022-06-20 PROCEDURE — 3017F COLORECTAL CA SCREEN DOC REV: CPT | Performed by: UROLOGY

## 2022-06-20 PROCEDURE — 99214 OFFICE O/P EST MOD 30 MIN: CPT | Performed by: FAMILY MEDICINE

## 2022-06-20 PROCEDURE — G8417 CALC BMI ABV UP PARAM F/U: HCPCS | Performed by: FAMILY MEDICINE

## 2022-06-20 PROCEDURE — 1036F TOBACCO NON-USER: CPT | Performed by: UROLOGY

## 2022-06-20 PROCEDURE — G8417 CALC BMI ABV UP PARAM F/U: HCPCS | Performed by: UROLOGY

## 2022-06-20 PROCEDURE — G8427 DOCREV CUR MEDS BY ELIG CLIN: HCPCS | Performed by: FAMILY MEDICINE

## 2022-06-20 PROCEDURE — 1036F TOBACCO NON-USER: CPT | Performed by: FAMILY MEDICINE

## 2022-06-20 PROCEDURE — 1123F ACP DISCUSS/DSCN MKR DOCD: CPT | Performed by: UROLOGY

## 2022-06-20 PROCEDURE — G8427 DOCREV CUR MEDS BY ELIG CLIN: HCPCS | Performed by: UROLOGY

## 2022-06-20 PROCEDURE — 3017F COLORECTAL CA SCREEN DOC REV: CPT | Performed by: FAMILY MEDICINE

## 2022-06-20 PROCEDURE — 1123F ACP DISCUSS/DSCN MKR DOCD: CPT | Performed by: FAMILY MEDICINE

## 2022-06-20 PROCEDURE — 99212 OFFICE O/P EST SF 10 MIN: CPT | Performed by: UROLOGY

## 2022-06-20 RX ORDER — ROSUVASTATIN CALCIUM 20 MG/1
20 TABLET, COATED ORAL NIGHTLY
Qty: 90 TABLET | Refills: 4 | Status: SHIPPED | OUTPATIENT
Start: 2022-06-20

## 2022-06-20 RX ORDER — ATENOLOL 50 MG/1
50 TABLET ORAL NIGHTLY
Qty: 90 TABLET | Refills: 4 | Status: SHIPPED | OUTPATIENT
Start: 2022-06-20

## 2022-06-20 RX ORDER — PANTOPRAZOLE SODIUM 40 MG/1
TABLET, DELAYED RELEASE ORAL
Qty: 90 TABLET | Refills: 4 | Status: SHIPPED | OUTPATIENT
Start: 2022-06-20

## 2022-06-20 RX ORDER — LIDOCAINE 50 MG/G
PATCH TOPICAL
COMMUNITY
Start: 2022-05-23

## 2022-06-20 ASSESSMENT — PATIENT HEALTH QUESTIONNAIRE - PHQ9
SUM OF ALL RESPONSES TO PHQ9 QUESTIONS 1 & 2: 0
9. THOUGHTS THAT YOU WOULD BE BETTER OFF DEAD, OR OF HURTING YOURSELF: 0
SUM OF ALL RESPONSES TO PHQ QUESTIONS 1-9: 0
4. FEELING TIRED OR HAVING LITTLE ENERGY: 0
2. FEELING DOWN, DEPRESSED OR HOPELESS: 0
10. IF YOU CHECKED OFF ANY PROBLEMS, HOW DIFFICULT HAVE THESE PROBLEMS MADE IT FOR YOU TO DO YOUR WORK, TAKE CARE OF THINGS AT HOME, OR GET ALONG WITH OTHER PEOPLE: 0
SUM OF ALL RESPONSES TO PHQ QUESTIONS 1-9: 0
7. TROUBLE CONCENTRATING ON THINGS, SUCH AS READING THE NEWSPAPER OR WATCHING TELEVISION: 0
SUM OF ALL RESPONSES TO PHQ QUESTIONS 1-9: 0
5. POOR APPETITE OR OVEREATING: 0
3. TROUBLE FALLING OR STAYING ASLEEP: 0
SUM OF ALL RESPONSES TO PHQ QUESTIONS 1-9: 0
6. FEELING BAD ABOUT YOURSELF - OR THAT YOU ARE A FAILURE OR HAVE LET YOURSELF OR YOUR FAMILY DOWN: 0
8. MOVING OR SPEAKING SO SLOWLY THAT OTHER PEOPLE COULD HAVE NOTICED. OR THE OPPOSITE, BEING SO FIGETY OR RESTLESS THAT YOU HAVE BEEN MOVING AROUND A LOT MORE THAN USUAL: 0
1. LITTLE INTEREST OR PLEASURE IN DOING THINGS: 0

## 2022-06-20 NOTE — PROGRESS NOTES
Subjective  Veronica Abrams, 78 y.o. male presents today with:  Chief Complaint   Patient presents with    Follow-up     2 month follow up            HPI    04/18/2022:Patient is here for follow-up of CAD/HTN. Notes he has been getting low blood pressure - 70/40, 80/60. [place] Gets lightheaded and mild headaches. IS taking Imdur 120 mg once a day. No chest pain, shortness of breath, palpitations, edema, paroxysmal nocturnal dyspnea, orthopnea. Is compliant with meds which do not cause significant side effects. Avoids added salt; exercises occasionally and tries to eat a healthy diet.     Memory issues. Name recall and short term loss. Minicog - missed one word. Occasionally does not recall having had a conversation. Ran a tank of propane out because he forgot to turn it off. Not missing appointments or meds. No med errors. Uses written reminders. Wife with long-term memory loss. Exercise 5-7 days a week. Mood - has been depressed and irritable since dx of recurrent prostate ca which was just treated with 36 radx. Issues with wife who he feels was dismissive of dx but understands she was trying to make him feel better.      GERD. Well-controlled with PPI, caffeine restriction, diet restriction. No weight loss. No abdominal pain. No bloody or black tarry stools.     Left knee is constantly hurting. Has gelling. WOrse with changing position in bed, prolonged weight-bearing. No catching Ascending and descending stairs are equally painful.      06/20/2022: Here for follow-up of hypotension possibly related to Imdur. That medication is tapered off. IS taking lisinopril 10 mg without side effects. In the morning his BP is high, then he takes lisinopril which normalizes BP.   On atenolol for CAD (taken for 30 years)    No other questions and or concerns for today's visit      Review of Systems      Past Medical History:   Diagnosis Date    Cancer [facility])     prostate    Hyperlipidemia     Hypertension      Past Surgical History:   Procedure Laterality Date    CARDIAC CATHETERIZATION  2019    CATARACT REMOVAL WITH IMPLANT Bilateral 2013    COLONOSCOPY  2016    COLONOSCOPY N/A 4/15/2021    COLORECTAL CANCER SCREENING, HIGH RISK performed by Carl Huber MD at [address]  1998    CORONARY ANGIOPLASTY WITH STENT PLACEMENT  2007    CORONARY ANGIOPLASTY WITH STENT PLACEMENT  2009    CORONARY ANGIOPLASTY WITH STENT PLACEMENT  2012    CYSTOSCOPY  04/15/2021    HEMORRHOID SURGERY  2013    INGUINAL HERNIA REPAIR  1990    PROSTATE SURGERY  2003    removed    PROSTATE SURGERY  2013    biopsy    UPPER GASTROINTESTINAL ENDOSCOPY  2018    UPPER GASTROINTESTINAL ENDOSCOPY N/A 4/15/2021    EGD DIAGNOSTIC ONLY performed by Carl Huber MD at [address] ENDOSCOPY  04/15/2021    VASECTOMY       Social History     Socioeconomic History    Marital status:      Spouse name: Not on file    Number of children: Not on file    Years of education: Not on file    Highest education level: Not on file   Occupational History    Not on file   Tobacco Use    Smoking status: Former Smoker     Packs/day: 1.00     Years: 51.00     Pack years: 51.00     Types: Cigarettes     Start date: 58     Quit date: 3/9/2011     Years since quittin.2    Smokeless tobacco: Never Used    Tobacco comment: started age 17    Vaping Use    Vaping Use: Never used   Substance and Sexual Activity    Alcohol use:  Yes     Alcohol/week: 2.0 - 3.0 standard drinks     Types: 2 - 3 Cans of beer per week     Comment: 2-3 weeks, 2-4 drinks 2-3/per week    Drug use: Never    Sexual activity: Never   Other Topics Concern    Not on file   Social History Narrative    Not on file     Social Determinants of Health     Financial Resource Strain: Low Risk     Difficulty of Paying Living Expenses: Not hard at all   Food Insecurity: No Food Insecurity    Worried About Running Out of Food in the Last Year: Never true    Lisa of Food in the Last Year: Never true   Transportation Needs: No Transportation Needs    Lack of Transportation (Medical): No    Lack of Transportation (Non-Medical):  No   Physical Activity: Sufficiently Active    Days of Exercise per Week: 5 days    Minutes of Exercise per Session: 30 min   Stress:     Feeling of Stress : Not on file   Social Connections:     Frequency of Communication with Friends and Family: Not on file    Frequency of Social Gatherings with Friends and Family: Not on file    Attends Mosque Services: Not on file    Active Member of Clubs or Organizations: Not on file    Attends Club or Organization Meetings: Not on file    Marital Status: Not on file   Intimate Partner Violence:     Fear of Current or Ex-Partner: Not on file    Emotionally Abused: Not on file    Physically Abused: Not on file    Sexually Abused: Not on file   Housing Stability:     Unable to Pay for Housing in the Last Year: Not on file    Number of Places Lived in the Last Year: Not on file    Unstable Housing in the Last Year: Not on file     Family History   Problem Relation Age of Onset    Cancer Father         prostate    Heart Disease Father     Cancer Brother         prostate    Heart Disease Brother     Heart Attack Brother     Heart Disease Paternal Uncle     Cancer Other     Stroke Mother     Heart Disease Brother     Heart Disease Brother     Heart Disease Brother     Heart Disease Brother     Colon Cancer Neg Hx      Allergies   Allergen Reactions    Iodides Nausea And Vomiting     Contrast Dye     Current Outpatient Medications   Medication Sig Dispense Refill    lidocaine (LIDODERM) 5 %       COVID-19 mRNA Virus Vaccine (COVID-19 MRNA VACCINE, PFIZER, IM) Inject into the muscle Booster      atenolol (TENORMIN) 50 MG tablet Take 1 tablet by mouth nightly 90 tablet 4    rosuvastatin (CRESTOR) 20 MG tablet Take 1 tablet by mouth nightly 90 tablet 4    pantoprazole (PROTONIX) 40 MG tablet TAKE 1 TABLET DAILY 90 tablet 4    lisinopril (PRINIVIL;ZESTRIL) 10 MG tablet TAKE 1 TABLET DAILY (Patient taking differently: Pt only takes prn) 90 tablet 3    psyllium (METAMUCIL) 28 % packet Take 1 packet by mouth 2 times daily Take with full glass of h20 or juice      Magnesium 400 MG CAPS Take 1 capsule by mouth daily (Patient taking differently: Take 1 capsule by mouth nightly ) 90 capsule 4    nitroGLYCERIN (NITROSTAT) 0.4 MG SL tablet Place 0.4 mg under the tongue every 5 minutes as needed for Chest pain up to max of 3 total doses. If no relief after 1 dose, call 911.  aspirin 81 MG tablet Take 81 mg by mouth daily      Cholecalciferol (VITAMIN D3) 1000 units TABS Take by mouth daily        No current facility-administered medications for this visit. PMH, Surgical Hx, Family Hx, and Social Hxreviewed and updated. Health Maintenance reviewed. Objective    Vitals:    06/20/22 0945   BP: 122/82   Pulse: 54   Temp: 96.9 °F (36.1 °C)   TempSrc: Temporal   SpO2: 99%   Weight: 192 lb (87.1 kg)   Height: 5' 4\" (1.626 m)        Physical Exam  Constitutional:       Appearance: He is well-developed. HENT:      Head: Normocephalic and atraumatic. Eyes:      Conjunctiva/sclera: Conjunctivae normal.   Pulmonary:      Effort: Pulmonary effort is normal.   Neurological:      Mental Status: He is alert and oriented to person, place, and time. Psychiatric:         Mood and Affect: Mood normal.         Behavior: Behavior normal.         Thought Content:  Thought content normal.         Judgment: Judgment normal.           Lab Results   Component Value Date    LABA1C 5.6 09/16/2021    LABA1C 5.9 02/13/2020     Lab Results   Component Value Date    CREATININE 1.4 (H) 12/28/2021     Lab Results   Component Value Date    ALT 17 09/09/2021    AST 19 09/09/2021     Lab Results   Component Value Date    CHOL 154 09/09/2021    TRIG 141 09/09/2021    HDL 58 09/09/2021    LDLCALC 68 09/09/2021        Assessment & Plan   Visit Diagnoses and Associated Orders     Essential hypertension    -  Primary    Improving but suboptimal control. Will change to taking lisnopril 10 mg with evening meds. COnsider dose increase. Comprehensive Metabolic Panel [[id number] Custom]   - Future Order         Coronary artery disease involving native coronary artery of native heart without angina pectoris        stable and well-controlled on current meds    atenolol (TENORMIN) 50 MG tablet [718]           Alcohol use disorder, mild, abuse        follow labs    Comprehensive Metabolic Panel [[id number] Custom]   - Future Order    Danney Refugio Custom]   - Future Order    CBC with Auto Differential [[id number] Custom]   - Future Order    Vitamin B12 & Folate [[id number] Custom]   - Future Order         Stage 3a chronic kidney disease (HCC)        stable, fair control; follow labs; labs prior to next appt    Comprehensive Metabolic Panel [[id number] Custom]   - Future Order         Hypotension due to drugs        Resolved with withdrawal of Imdur. Pure hypercholesterolemia        follow labs    rosuvastatin (CRESTOR) 20 MG tablet [[id number]]      Lipid, Fasting [[id number] Custom]   - Future Order         Shaw's esophagus with dysplasia        Stable and well-controlled with PPI. Change omeprazole to pantoprazole. EGD 1 year    pantoprazole (PROTONIX) 40 MG tablet [[id number]]                   Reviewed with the patient: all disease processes, current clinical status, medications, activities and diet.      Side effects, adverse effects of the medication prescribed today, as well as treatment plan/ rationale and result expectations have been discussed with the patient who expresses understanding and desires to proceed.     Close follow up to evaluate treatment results and for coordination of care.   I have reviewed the patient's medical history in detail and updated the computerized patient record. More than 50% of the appointment was spent in face-to-face counseling, education and care coordination. Please note this report has been partially produced using speech recognition software and may contain mistakes related to that system including errors in grammar, punctuation and spelling as well as words and phrases that may seem inappropriate. If there are questions or concerns, please feel free to contact me to clarify. Orders Placed This Encounter   Procedures    Comprehensive Metabolic Panel     Standing Status:   Future     Standing Expiration Date:   9/20/2022    Gamma GT     Standing Status:   Future     Standing Expiration Date:   6/20/2023    CBC with Auto Differential     Standing Status:   Future     Standing Expiration Date:   6/20/2023    Vitamin B12 & Folate     Standing Status:   Future     Standing Expiration Date:   6/20/2023    Lipid, Fasting     Standing Status:   Future     Standing Expiration Date:   6/20/2023     Orders Placed This Encounter   Medications    atenolol (TENORMIN) 50 MG tablet     Sig: Take 1 tablet by mouth nightly     Dispense:  90 tablet     Refill:  4    rosuvastatin (CRESTOR) 20 MG tablet     Sig: Take 1 tablet by mouth nightly     Dispense:  90 tablet     Refill:  4    pantoprazole (PROTONIX) 40 MG tablet     Sig: TAKE 1 TABLET DAILY     Dispense:  90 tablet     Refill:  4     Medications Discontinued During This Encounter   Medication Reason    atenolol (TENORMIN) 50 MG tablet REORDER    rosuvastatin (CRESTOR) 20 MG tablet REORDER    pantoprazole (PROTONIX) 40 MG tablet REORDER     Return in about 3 months (around 9/20/2022) for CAD, CKD, HTN, HLD - OV. Controlled Substance Monitoring:    Acute and Chronic Pain Monitoring:   No flowsheet data found.         Sho Bolaños MD

## 2022-06-20 NOTE — PROGRESS NOTES
[facility] EVALUATION NOTE                                                 H&P                                                                                                                                                 Reason for Visit  Prostate cancer    History of Present Illness  72-year-old male who underwent radical prostatectomy followed by rising PSAs treated with radiation therapy and hormonal therapy  Patient currently is off hormonal therapy as PSA is less than 0.03  Patient will be getting his PSAs done by his primary care physician and call  if the PSA starts to climb      Urologic Review of Systems/Symptoms  No issues with urination    Review of Systems  Hospitalization: None recent  All 14 categories of Review of Systems otherwise reviewed no other findings reported.   No change in review of systems  Past Medical History:   Diagnosis Date    Cancer [facility])     prostate    Hyperlipidemia     Hypertension      Past Surgical History:   Procedure Laterality Date    CARDIAC CATHETERIZATION  12/2019    CATARACT REMOVAL WITH IMPLANT Bilateral 06/2013    COLONOSCOPY  04/2016    COLONOSCOPY N/A 4/15/2021    COLORECTAL CANCER SCREENING, HIGH RISK performed by Tammy Gregorio MD at [address]  06/1998    CORONARY ANGIOPLASTY WITH STENT PLACEMENT  02/2007    CORONARY ANGIOPLASTY WITH STENT PLACEMENT  01/2009    CORONARY ANGIOPLASTY WITH STENT PLACEMENT  07/2012    CYSTOSCOPY  04/15/2021    HEMORRHOID SURGERY  11/2013    INGUINAL HERNIA REPAIR  05/1990    PROSTATE SURGERY  06/2003    removed    PROSTATE SURGERY  09/2013    biopsy    UPPER GASTROINTESTINAL ENDOSCOPY  09/2018    UPPER GASTROINTESTINAL ENDOSCOPY N/A 4/15/2021    EGD DIAGNOSTIC ONLY performed by Tammy Gregorio MD at [address] ENDOSCOPY  04/15/2021    VASECTOMY       Social History     Socioeconomic History    Marital status:      Spouse name: None    Number of children: None    Years of education: None    Highest education level: None   Occupational History    None   Tobacco Use    Smoking status: Former Smoker     Packs/day: 1.00     Years: 51.00     Pack years: 51.00     Types: Cigarettes     Start date: 58     Quit date: 3/9/2011     Years since quittin.2    Smokeless tobacco: Never Used    Tobacco comment: started age 17    Vaping Use    Vaping Use: Never used   Substance and Sexual Activity    Alcohol use: Yes     Alcohol/week: 2.0 - 3.0 standard drinks     Types: 2 - 3 Cans of beer per week     Comment: 2-3 weeks, 2-4 drinks 2-3/per week    Drug use: Never    Sexual activity: Never   Other Topics Concern    None   Social History Narrative    None     Social Determinants of Health     Financial Resource Strain: Low Risk     Difficulty of Paying Living Expenses: Not hard at all   Food Insecurity: No Food Insecurity    Worried About Running Out of Food in the Last Year: Never true    Lisa of Food in the Last Year: Never true   Transportation Needs: No Transportation Needs    Lack of Transportation (Medical): No    Lack of Transportation (Non-Medical):  No   Physical Activity: Sufficiently Active    Days of Exercise per Week: 5 days    Minutes of Exercise per Session: 30 min   Stress:     Feeling of Stress : Not on file   Social Connections:     Frequency of Communication with Friends and Family: Not on file    Frequency of Social Gatherings with Friends and Family: Not on file    Attends [employer] Services: Not on file    Active Member of Clubs or Organizations: Not on file    Attends Club or Organization Meetings: Not on file    Marital Status: Not on file   Intimate Partner Violence:     Fear of Current or Ex-Partner: Not on file    Emotionally Abused: Not on file    Physically Abused: Not on file    Sexually Abused: Not on file   Housing Stability:     Unable to Pay for Housing in the Last Year: Not on file    Number of Places Lived in the Last Year: Not on file    Unstable Housing in the Last Year: Not on file     Family History   Problem Relation Age of Onset    Cancer Father         prostate    Heart Disease Father     Cancer Brother         prostate    Heart Disease Brother     Heart Attack Brother     Heart Disease Paternal Uncle     Cancer Other     Stroke Mother     Heart Disease Brother     Heart Disease Brother     Heart Disease Brother     Heart Disease Brother     Colon Cancer Neg Hx      Current Outpatient Medications   Medication Sig Dispense Refill    lidocaine (LIDODERM) 5 %       COVID-19 mRNA Virus Vaccine (COVID-19 MRNA VACCINE, PFIZER, IM) Inject into the muscle Booster      lisinopril (PRINIVIL;ZESTRIL) 10 MG tablet TAKE 1 TABLET DAILY (Patient taking differently: Pt only takes prn) 90 tablet 3    psyllium (METAMUCIL) 28 % packet Take 1 packet by mouth 2 times daily Take with full glass of h20 or juice      Magnesium 400 MG CAPS Take 1 capsule by mouth daily (Patient taking differently: Take 1 capsule by mouth nightly ) 90 capsule 4    nitroGLYCERIN (NITROSTAT) 0.4 MG SL tablet Place 0.4 mg under the tongue every 5 minutes as needed for Chest pain up to max of 3 total doses. If no relief after 1 dose, call 911.  aspirin 81 MG tablet Take 81 mg by mouth daily      Cholecalciferol (VITAMIN D3) 1000 units TABS Take by mouth daily       atenolol (TENORMIN) 50 MG tablet Take 1 tablet by mouth nightly 90 tablet 4    rosuvastatin (CRESTOR) 20 MG tablet Take 1 tablet by mouth nightly 90 tablet 4    pantoprazole (PROTONIX) 40 MG tablet TAKE 1 TABLET DAILY 90 tablet 4     No current facility-administered medications for this visit.      Iodides  All reviewed and verified by Dr Zoraida Landin on today's visit    PSA   Date Value Ref Range Status   06/08/2022 0.03 0.00 - 4.00 ng/mL Final   12/06/2021 10.53 (H) 0.00 - 4.00 ng/mL Final   06/10/2021 0.21 0.00 - 6.22 ng/mL Final     Comment:     When the Total PSA is between 3.00 and 10.00 ng/mL, consider  requesting a Free PSA to aid in diagnosis. 12/10/2020 0.11 0.00 - 6.22 ng/mL Final   05/04/2020 0.30 0.00 - 6.22 ng/mL Final     No results found for this visit on 06/20/22. Physical Exam  Vitals:    06/20/22 0809   BP: 130/88   Pulse: 53   Weight: 190 lb (86.2 kg)   Height: 5' 4\" (1.626 m)     Constitutional: Not in distress  Physical exam otherwise unchanged. 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 No orders of the defined types were placed in this encounter. Ivy Luna MD       Please note this report has been partially produced using speech recognition software  And may cause contain errors related to that system including grammar, punctuation and spelling as well as words and phrases that may seem inappropriate. If there are questions or concerns please feel free to contact me to clarify.

## 2022-09-12 ENCOUNTER — PATIENT MESSAGE (OUTPATIENT)
Dept: FAMILY MEDICINE CLINIC | Age: 77
End: 2022-09-12

## 2022-09-15 ENCOUNTER — OFFICE VISIT (OUTPATIENT)
Dept: PRIMARY CARE CLINIC | Age: 77
End: 2022-09-15
Payer: MEDICARE

## 2022-09-15 VITALS
TEMPERATURE: 98.2 F | HEART RATE: 96 BPM | BODY MASS INDEX: 32.79 KG/M2 | WEIGHT: 191 LBS | SYSTOLIC BLOOD PRESSURE: 142 MMHG | OXYGEN SATURATION: 96 % | DIASTOLIC BLOOD PRESSURE: 82 MMHG

## 2022-09-15 DIAGNOSIS — I10 ESSENTIAL HYPERTENSION: ICD-10-CM

## 2022-09-15 DIAGNOSIS — Z00.00 ROUTINE ADULT HEALTH MAINTENANCE: ICD-10-CM

## 2022-09-15 DIAGNOSIS — Z87.891 PERSONAL HISTORY OF TOBACCO USE: ICD-10-CM

## 2022-09-15 DIAGNOSIS — F32.A DEPRESSION, UNSPECIFIED DEPRESSION TYPE: ICD-10-CM

## 2022-09-15 DIAGNOSIS — E78.5 HYPERLIPIDEMIA, UNSPECIFIED HYPERLIPIDEMIA TYPE: ICD-10-CM

## 2022-09-15 DIAGNOSIS — E78.00 PURE HYPERCHOLESTEROLEMIA: ICD-10-CM

## 2022-09-15 DIAGNOSIS — F10.10 ALCOHOL USE DISORDER, MILD, ABUSE: ICD-10-CM

## 2022-09-15 DIAGNOSIS — N18.31 STAGE 3A CHRONIC KIDNEY DISEASE (HCC): ICD-10-CM

## 2022-09-15 DIAGNOSIS — Z00.00 ROUTINE ADULT HEALTH MAINTENANCE: Primary | ICD-10-CM

## 2022-09-15 LAB
ALBUMIN SERPL-MCNC: 4.5 G/DL (ref 3.5–4.6)
ALP BLD-CCNC: 76 U/L (ref 35–104)
ALT SERPL-CCNC: 20 U/L (ref 0–41)
ANION GAP SERPL CALCULATED.3IONS-SCNC: 8 MEQ/L (ref 9–15)
AST SERPL-CCNC: 22 U/L (ref 0–40)
BASOPHILS ABSOLUTE: 0 K/UL (ref 0–0.2)
BASOPHILS RELATIVE PERCENT: 1.2 %
BILIRUB SERPL-MCNC: 1.1 MG/DL (ref 0.2–0.7)
BUN BLDV-MCNC: 18 MG/DL (ref 8–23)
CALCIUM SERPL-MCNC: 9.8 MG/DL (ref 8.5–9.9)
CHLORIDE BLD-SCNC: 105 MEQ/L (ref 95–107)
CHOLESTEROL, FASTING: 126 MG/DL (ref 0–199)
CO2: 28 MEQ/L (ref 20–31)
CREAT SERPL-MCNC: 1.12 MG/DL (ref 0.7–1.2)
EOSINOPHILS ABSOLUTE: 0.1 K/UL (ref 0–0.7)
EOSINOPHILS RELATIVE PERCENT: 2.2 %
GFR AFRICAN AMERICAN: >60
GFR NON-AFRICAN AMERICAN: >60
GLOBULIN: 2.2 G/DL (ref 2.3–3.5)
GLUCOSE BLD-MCNC: 105 MG/DL (ref 70–99)
HBA1C MFR BLD: 5.5 % (ref 4.8–5.9)
HCT VFR BLD CALC: 41.7 % (ref 42–52)
HDLC SERPL-MCNC: 57 MG/DL (ref 40–59)
HEMOGLOBIN: 14.3 G/DL (ref 14–18)
LDL CHOLESTEROL CALCULATED: 46 MG/DL (ref 0–129)
LYMPHOCYTES ABSOLUTE: 0.6 K/UL (ref 1–4.8)
LYMPHOCYTES RELATIVE PERCENT: 15.7 %
MCH RBC QN AUTO: 31.5 PG (ref 27–31.3)
MCHC RBC AUTO-ENTMCNC: 34.4 % (ref 33–37)
MCV RBC AUTO: 91.5 FL (ref 80–100)
MONOCYTES ABSOLUTE: 0.4 K/UL (ref 0.2–0.8)
MONOCYTES RELATIVE PERCENT: 9.7 %
NEUTROPHILS ABSOLUTE: 2.8 K/UL (ref 1.4–6.5)
NEUTROPHILS RELATIVE PERCENT: 71.2 %
PDW BLD-RTO: 13.5 % (ref 11.5–14.5)
PLATELET # BLD: 122 K/UL (ref 130–400)
POTASSIUM SERPL-SCNC: 4.9 MEQ/L (ref 3.4–4.9)
RBC # BLD: 4.56 M/UL (ref 4.7–6.1)
SODIUM BLD-SCNC: 141 MEQ/L (ref 135–144)
TOTAL PROTEIN: 6.7 G/DL (ref 6.3–8)
TRIGLYCERIDE, FASTING: 113 MG/DL (ref 0–150)
WBC # BLD: 4 K/UL (ref 4.8–10.8)

## 2022-09-15 PROCEDURE — G8417 CALC BMI ABV UP PARAM F/U: HCPCS | Performed by: INTERNAL MEDICINE

## 2022-09-15 PROCEDURE — 1036F TOBACCO NON-USER: CPT | Performed by: INTERNAL MEDICINE

## 2022-09-15 PROCEDURE — G8427 DOCREV CUR MEDS BY ELIG CLIN: HCPCS | Performed by: INTERNAL MEDICINE

## 2022-09-15 PROCEDURE — 3017F COLORECTAL CA SCREEN DOC REV: CPT | Performed by: INTERNAL MEDICINE

## 2022-09-15 PROCEDURE — G0296 VISIT TO DETERM LDCT ELIG: HCPCS | Performed by: INTERNAL MEDICINE

## 2022-09-15 PROCEDURE — 1123F ACP DISCUSS/DSCN MKR DOCD: CPT | Performed by: INTERNAL MEDICINE

## 2022-09-15 PROCEDURE — 99214 OFFICE O/P EST MOD 30 MIN: CPT | Performed by: INTERNAL MEDICINE

## 2022-09-15 ASSESSMENT — ENCOUNTER SYMPTOMS
SHORTNESS OF BREATH: 0
CHEST TIGHTNESS: 0
NAUSEA: 0
WHEEZING: 0
VOMITING: 0
COUGH: 0

## 2022-09-15 NOTE — PROGRESS NOTES
Subjective:      Patient ID: Mandy Garrett is a 78 y.o. male who presents today for:  Chief Complaint   Patient presents with    New Patient     Switching doctors looking to see if this doctor is a match for him   No med refills   Needs blood work        HPI  Patient is new to me. Presenting for a routine visit. Transitioning care from previous Provider. Medical history is significant for HTN, HLD, CAD, remote Prostate Cancer and GERD. Course as follows;   Prostate Cancer- diagnosed in 2003, underwent a radical prostatectomy in 2003 with subsequent hormonal therapy in 2016 due to rising PSA values. Local disease recurrence in former location of the prostate noted in January 2022 for which he ws placed on salvage radiotherapy. Completed radiation treatment on 4/7/22; underwent 36 cycles. Patient currently is off hormonal therapy as PSA is less than 0.03 (6/8/22). Follows with Dr. Yeyo Soto. Undergoes 6-monthly PSA checks. HTN- Home BP readings reviewed; shows moderate control on current regimen. No associated symptoms. Tries to adhere to a low salt diet, stays active- walks to treadmill 5-7 times a week. HLD- Controlled on statin therapy. As above, making healthy lifestyle choices. CAD- s/p 8 stents, 7 to RCA and one to LCx. Reports infrequent episodes of chest pain, takes NG as needed. Occasional SOB on exertion. On ASA and statin therapy. Follows with Dr. Katalina Nesbitt. GERD- symptoms controlled on PPI therapy. Adheres to antireflux measures. Muscle cramping- RLE, mostly at night. Mg supplements helping. Health Maintenance- Colonoscopy- April 2021. NAD. Due for lung cancer screen. Former smoker, stopped in March 2011. Patient also reports a history of recurrent depressed mood today; possible setting of ongoing stressors. Reiterates, as expressed to his previous Provider, that he does not want to placed on medications as he is concerned about their side effects. Able to function.     Past Medical History:   Diagnosis Date    Cancer [facility])     prostate    Hyperlipidemia     Hypertension      Past Surgical History:   Procedure Laterality Date    CARDIAC CATHETERIZATION  12/2019    CATARACT REMOVAL WITH IMPLANT Bilateral 06/2013    COLONOSCOPY  04/2016    COLONOSCOPY N/A 4/15/2021    COLORECTAL CANCER SCREENING, HIGH RISK performed by Elzbieta Terrell MD at [facility]  06/1998    CORONARY ANGIOPLASTY WITH STENT PLACEMENT  02/2007    CORONARY ANGIOPLASTY WITH STENT PLACEMENT  01/2009    CORONARY ANGIOPLASTY WITH STENT PLACEMENT  07/2012    CYSTOSCOPY  04/15/2021    HEMORRHOID SURGERY  11/2013    INGUINAL HERNIA REPAIR  05/1990    PROSTATE SURGERY  06/2003    removed    PROSTATE SURGERY  09/2013    biopsy    UPPER GASTROINTESTINAL ENDOSCOPY  09/2018    UPPER GASTROINTESTINAL ENDOSCOPY N/A 4/15/2021    EGD DIAGNOSTIC ONLY performed by Elzbieta Terrell MD at [address] [facility]  04/15/2021    VASECTOMY       Family History   Problem Relation Age of Onset    Cancer Father         prostate    Heart Disease Father     Cancer Brother         prostate    Heart Disease Brother     Heart Attack Brother     Heart Disease Paternal Uncle     Cancer Other     Stroke Mother     Heart Disease Brother     Heart Disease Brother     Heart Disease Brother     Heart Disease Brother     Colon Cancer Neg Hx      Allergies   Allergen Reactions    Iodides Nausea And Vomiting     Contrast Dye     Current Outpatient Medications on File Prior to Visit   Medication Sig Dispense Refill    lidocaine (LIDODERM) 5 %       COVID-19 mRNA Virus Vaccine (COVID-19 MRNA VACCINE, PFIZER, IM) Inject into the muscle Booster      atenolol (TENORMIN) 50 MG tablet Take 1 tablet by mouth nightly 90 tablet 4    rosuvastatin (CRESTOR) 20 MG tablet Take 1 tablet by mouth nightly 90 tablet 4    pantoprazole (PROTONIX) 40 MG tablet TAKE 1 TABLET DAILY 90 tablet 4    lisinopril (PRINIVIL;ZESTRIL) 10 MG tablet TAKE 1 TABLET DAILY (Patient taking differently: Pt only takes prn) 90 tablet 3    psyllium (METAMUCIL) 28 % packet Take 1 packet by mouth 2 times daily Take with full glass of h20 or juice      Magnesium 400 MG CAPS Take 1 capsule by mouth daily (Patient taking differently: Take 1 capsule by mouth nightly) 90 capsule 4    nitroGLYCERIN (NITROSTAT) 0.4 MG SL tablet Place 0.4 mg under the tongue every 5 minutes as needed for Chest pain up to max of 3 total doses. If no relief after 1 dose, call 911.       aspirin 81 MG tablet Take 81 mg by mouth daily      Cholecalciferol (VITAMIN D3) 1000 units TABS Take by mouth daily        No current facility-administered medications on file prior to visit. Review of Systems   Constitutional:  Negative for activity change, chills, diaphoresis, fatigue and fever. Respiratory:  Negative for cough, chest tightness, shortness of breath and wheezing. Cardiovascular:  Negative for chest pain, palpitations and leg swelling. Gastrointestinal:  Negative for nausea and vomiting. Neurological:  Negative for dizziness, syncope, light-headedness and headaches. Objective:   BP (!) 142/82 (Site: Right Upper Arm, Position: Sitting, Cuff Size: Large Adult)   Pulse 96   Temp 98.2 °F (36.8 °C) (Temporal)   Wt 191 lb (86.6 kg)   SpO2 96%   BMI 32.79 kg/m²     Physical Exam  Constitutional:       General: He is not in acute distress. Appearance: Normal appearance. He is normal weight. He is not diaphoretic. HENT:      Head: Normocephalic and atraumatic. Cardiovascular:      Rate and Rhythm: Normal rate and regular rhythm. Pulses: Normal pulses. Heart sounds: Normal heart sounds. No murmur heard. No friction rub. Pulmonary:      Effort: Pulmonary effort is normal. No respiratory distress. Breath sounds: Normal breath sounds. No wheezing or rhonchi. Chest:      Chest wall: No tenderness.    Abdominal:      General: Abdomen is flat. Bowel sounds are normal. There is no distension. Palpations: Abdomen is soft. Tenderness: There is no abdominal tenderness. Musculoskeletal:         General: No tenderness. Normal range of motion. Right lower leg: No edema. Left lower leg: No edema. Neurological:      Mental Status: He is alert. Assessment:      Alka Porter was seen today for new patient. Diagnoses and all orders for this visit:    Routine adult health maintenance        -     Medical history reviewed        -     Medication list reconciled. -     Discussed health screening, general lifestyle modifications        -     Routine blood work pending- CBC, CMP, lipid panel. Follow. -     Hemoglobin A1C; Future    Essential hypertension        -     Home BP readings reviewed; moderately well controlled        -     Continue on lisinopril and atenolol as prescribed        -     Continue home BP monitoring with log; will review at next visit. -     Low salt diet, general lifestyle modifications. Hyperlipidemia, unspecified hyperlipidemia type        -     Pending lipid panel        -     Continue statin therapy        -     Continued healthy lifestyle choices as discussed. Personal history of tobacco use  -     CT VISIT TO DISCUSS LUNG CA SCREEN W LDCT  -     CT Lung Screen (Annual); Future    Depression       -     Discussed treatment options at length. Patient chooses to defer on pharmacologic treatment, not comfortable with possible side effects. Discussed non-pharmacologic treatment/therapy also as an effective treatment option, patient to consider and let me know.     Health Maintenance   Topic Date Due    Shingles vaccine (2 of 2) 07/25/2019    Flu vaccine (1) 09/01/2022    COVID-19 Vaccine (5 - Booster for Pfizer series) 09/13/2022    Lipids  09/09/2022    A1C test (Diabetic or Prediabetic)  09/16/2022    Low dose CT lung screening  09/23/2022    Annual Wellness Visit (AWV)  04/19/2023 Prostate Specific Antigen (PSA) Screening or Monitoring  2023    Depression Screen  2023    DTaP/Tdap/Td vaccine (2 - Td or Tdap) 2026    Colorectal Cancer Screen  04/15/2031    Pneumococcal 65+ years Vaccine  Completed    Hepatitis A vaccine  Aged Out    Hepatitis B vaccine  Aged Out    Hib vaccine  Aged Out    Meningococcal (ACWY) vaccine  Aged Out    AAA screen  Discontinued    Hepatitis C screen  Discontinued            Plan:    As above. Orders Placed This Encounter   Procedures    CT Lung Screen (Annual)     Age: Patient is 78 y.o. Smoking History: Social History    Tobacco Use      Smoking status: Former        Packs/day: 1.00        Years: 51.00        Pack years: 46        Types: Cigarettes        Start date: 58        Quit date: 3/9/2011        Years since quittin.5      Smokeless tobacco: Never      Tobacco comments: started age 17     Vaping Use      Vaping Use: Never used    Alcohol use: Yes      Alcohol/week: 2.0 - 3.0 standard drinks      Types: 2 - 3 Cans of beer per week      Comment: 2-3 weeks, 2-4 drinks 2-3/per week    Drug use: Never   Pack years: 46    Date of last lung cancer screenin2021     Standing Status:   Future     Standing Expiration Date:   3/15/2024     Order Specific Question:   Is there documentation of shared decision making? Answer:   Yes     Order Specific Question:   Is this a low dose CT or a routine CT? Answer:   Low Dose CT [1]     Order Specific Question:   Is this the first (baseline) CT or an annual exam?     Answer: Annual [2]     Order Specific Question:   Does the patient show any signs or symptoms of lung cancer? Answer:   No     Order Specific Question:   Smoking Status? Answer: Former [4]     Order Specific Question:   Date quit smoking? (must be within 15 years)     Answer:   3/9/2011     Order Specific Question:   Smoking packs per day? Answer:   1     Order Specific Question:   Years smoking? Answer:   51    Hemoglobin A1C     Standing Status:   Future     Number of Occurrences:   1     Standing Expiration Date:   9/15/2023    [place] VISIT TO DISCUSS LUNG CA SCREEN W LDCT     No orders of the defined types were placed in this encounter. Return for F/u in 6 months. Patient counseled on treatment rationale and possible medication adverse effects; verbalizes understanding and willing to proceed with care. Debbie Lacy MD  Low Dose CT (LDCT) Lung Screening criteria met:     Age 50-77(Medicare) or 50-80 ([facility])   Pack year smoking >20   Still smoking or less than 15 year since quit   No sign or symptoms of lung cancer   > 11 months since last LDCT     Risks and benefits of lung cancer screening with LDCT scans discussed:    Significance of positive screen - False-positive LDCT results often occur. 95% of all positive results do not lead to a diagnosis of cancer. Usually further imaging can resolve most false-positive results; however, some patients may require invasive procedures. Over diagnosis risk - 10% to 12% of screen-detected lung cancer cases are over diagnosed--that is, the cancer would not have been detected in the patient's lifetime without the screening. Need for follow up screens annually to continue lung cancer screening effectiveness     Risks associated with radiation from annual LDCT- Radiation exposure is about the same as for a mammogram, which is about 1/3 of the annual background radiation exposure from everyday life. Starting screening at age 54 is not likely to increase cancer risk from radiation exposure. Patients with comorbidities resulting in life expectancy of < 10 years, or that would preclude treatment of an abnormality identified on CT, should not be screened due to lack of benefit.     To obtain maximal benefit from this screening, smoking cessation and long-term abstinence from smoking is critical

## 2022-09-16 DIAGNOSIS — E53.8 FOLIC ACID DEFICIENCY: ICD-10-CM

## 2022-09-16 DIAGNOSIS — E53.8 FOLIC ACID DEFICIENCY: Primary | ICD-10-CM

## 2022-09-16 LAB
FOLATE: 4 NG/ML
GGT: 38 U/L (ref 8–61)
VITAMIN B-12: 611 PG/ML (ref 232–1245)

## 2022-09-16 RX ORDER — FOLIC ACID 1 MG/1
1 TABLET ORAL DAILY
Qty: 90 TABLET | Refills: 0 | Status: SHIPPED | OUTPATIENT
Start: 2022-09-16

## 2022-09-16 RX ORDER — FOLIC ACID 1 MG/1
1 TABLET ORAL DAILY
Qty: 90 TABLET | Refills: 1 | Status: SHIPPED | OUTPATIENT
Start: 2022-09-16 | End: 2022-09-16 | Stop reason: SDUPTHER

## 2022-09-16 NOTE — TELEPHONE ENCOUNTER
Patient would like this medication to be sent to [facility], it was sent to [address]

## 2022-09-23 ENCOUNTER — HOSPITAL ENCOUNTER (EMERGENCY)
Age: 77
Discharge: HOME | End: 2022-09-23
Attending: EMERGENCY MEDICINE
Payer: MEDICARE

## 2022-09-23 ENCOUNTER — APPOINTMENT (OUTPATIENT)
Dept: GENERAL RADIOLOGY | Age: 77
End: 2022-09-23
Payer: MEDICARE

## 2022-09-23 VITALS
TEMPERATURE: 98.4 F | RESPIRATION RATE: 20 BRPM | SYSTOLIC BLOOD PRESSURE: 129 MMHG | HEIGHT: 64 IN | OXYGEN SATURATION: 100 % | BODY MASS INDEX: 32.44 KG/M2 | HEART RATE: 58 BPM | WEIGHT: 190 LBS | DIASTOLIC BLOOD PRESSURE: 70 MMHG

## 2022-09-23 DIAGNOSIS — R07.9 CHEST PAIN, UNSPECIFIED TYPE: Primary | ICD-10-CM

## 2022-09-23 LAB
ALBUMIN SERPL-MCNC: 4.4 G/DL (ref 3.5–4.6)
ALP BLD-CCNC: 74 U/L (ref 35–104)
ALT SERPL-CCNC: 22 U/L (ref 0–41)
ANION GAP SERPL CALCULATED.3IONS-SCNC: 10 MEQ/L (ref 9–15)
AST SERPL-CCNC: 22 U/L (ref 0–40)
BASOPHILS ABSOLUTE: 0 K/UL (ref 0–0.2)
BASOPHILS RELATIVE PERCENT: 0.7 %
BILIRUB SERPL-MCNC: 0.9 MG/DL (ref 0.2–0.7)
BUN BLDV-MCNC: 16 MG/DL (ref 8–23)
CALCIUM SERPL-MCNC: 9.4 MG/DL (ref 8.5–9.9)
CHLORIDE BLD-SCNC: 103 MEQ/L (ref 95–107)
CO2: 27 MEQ/L (ref 20–31)
CREAT SERPL-MCNC: 1.15 MG/DL (ref 0.7–1.2)
EKG ATRIAL RATE: 67 BPM
EKG P AXIS: 21 DEGREES
EKG P-R INTERVAL: 154 MS
EKG Q-T INTERVAL: 456 MS
EKG QRS DURATION: 90 MS
EKG QTC CALCULATION (BAZETT): 481 MS
EKG R AXIS: -1 DEGREES
EKG T AXIS: 42 DEGREES
EKG VENTRICULAR RATE: 67 BPM
EOSINOPHILS ABSOLUTE: 0.1 K/UL (ref 0–0.7)
EOSINOPHILS RELATIVE PERCENT: 2.6 %
GFR AFRICAN AMERICAN: >60
GFR NON-AFRICAN AMERICAN: >60
GLOBULIN: 2.2 G/DL (ref 2.3–3.5)
GLUCOSE BLD-MCNC: 144 MG/DL (ref 70–99)
HCT VFR BLD CALC: 41 % (ref 42–52)
HEMOGLOBIN: 14.1 G/DL (ref 14–18)
INR BLD: 0.9
LYMPHOCYTES ABSOLUTE: 0.7 K/UL (ref 1–4.8)
LYMPHOCYTES RELATIVE PERCENT: 16.1 %
MCH RBC QN AUTO: 31.3 PG (ref 27–31.3)
MCHC RBC AUTO-ENTMCNC: 34.3 % (ref 33–37)
MCV RBC AUTO: 91.2 FL (ref 80–100)
MONOCYTES ABSOLUTE: 0.4 K/UL (ref 0.2–0.8)
MONOCYTES RELATIVE PERCENT: 8.9 %
NEUTROPHILS ABSOLUTE: 3.1 K/UL (ref 1.4–6.5)
NEUTROPHILS RELATIVE PERCENT: 71.7 %
PDW BLD-RTO: 14 % (ref 11.5–14.5)
PLATELET # BLD: 114 K/UL (ref 130–400)
POTASSIUM SERPL-SCNC: 4.1 MEQ/L (ref 3.4–4.9)
PROTHROMBIN TIME: 12.6 SEC (ref 12.3–14.9)
RBC # BLD: 4.5 M/UL (ref 4.7–6.1)
SODIUM BLD-SCNC: 140 MEQ/L (ref 135–144)
TOTAL PROTEIN: 6.6 G/DL (ref 6.3–8)
TROPONIN: <0.01 NG/ML (ref 0–0.01)
TROPONIN: <0.01 NG/ML (ref 0–0.01)
WBC # BLD: 4.3 K/UL (ref 4.8–10.8)

## 2022-09-23 PROCEDURE — 84484 ASSAY OF TROPONIN QUANT: CPT

## 2022-09-23 PROCEDURE — 85610 PROTHROMBIN TIME: CPT

## 2022-09-23 PROCEDURE — 85025 COMPLETE CBC W/AUTO DIFF WBC: CPT

## 2022-09-23 PROCEDURE — 36415 COLL VENOUS BLD VENIPUNCTURE: CPT

## 2022-09-23 PROCEDURE — 93010 ELECTROCARDIOGRAM REPORT: CPT | Performed by: INTERNAL MEDICINE

## 2022-09-23 PROCEDURE — 93005 ELECTROCARDIOGRAM TRACING: CPT | Performed by: EMERGENCY MEDICINE

## 2022-09-23 PROCEDURE — 80053 COMPREHEN METABOLIC PANEL: CPT

## 2022-09-23 PROCEDURE — 99285 EMERGENCY DEPT VISIT HI MDM: CPT

## 2022-09-23 PROCEDURE — 71045 X-RAY EXAM CHEST 1 VIEW: CPT

## 2022-09-23 ASSESSMENT — ENCOUNTER SYMPTOMS
EYE PAIN: 0
NAUSEA: 0
CHEST TIGHTNESS: 0
SORE THROAT: 0
ABDOMINAL PAIN: 0
VOMITING: 0
SHORTNESS OF BREATH: 0

## 2022-09-23 ASSESSMENT — PAIN DESCRIPTION - ORIENTATION: ORIENTATION: ANTERIOR;MID

## 2022-09-23 ASSESSMENT — PAIN DESCRIPTION - DESCRIPTORS: DESCRIPTORS: PRESSURE

## 2022-09-23 ASSESSMENT — LIFESTYLE VARIABLES
HOW MANY STANDARD DRINKS CONTAINING ALCOHOL DO YOU HAVE ON A TYPICAL DAY: 3 OR 4
HOW OFTEN DO YOU HAVE A DRINK CONTAINING ALCOHOL: 2-3 TIMES A WEEK

## 2022-09-23 ASSESSMENT — PAIN DESCRIPTION - PAIN TYPE: TYPE: ACUTE PAIN

## 2022-09-23 ASSESSMENT — PAIN DESCRIPTION - LOCATION: LOCATION: CHEST

## 2022-09-23 ASSESSMENT — PAIN - FUNCTIONAL ASSESSMENT: PAIN_FUNCTIONAL_ASSESSMENT: 0-10

## 2022-09-23 NOTE — ED PROVIDER NOTES
[address] [facility] ED  EMERGENCY DEPARTMENT ENCOUNTER      Pt Name: Sridhar Fregoso  MRN: [id number]  [place] 1945  Date of evaluation: 9/23/2022  Provider: Gadiel Sesay DO    CHIEF COMPLAINT       Chief Complaint   Patient presents with    Chest Pain     Chest pain 5/10 while walking on treadmill this morning, then he went to mow the grass in an attempt to get chest pain to subside. HISTORY OF PRESENT ILLNESS   (Location/Symptom, Timing/Onset, Context/Setting, Quality, Duration, Modifying Factors, Severity)  Note limiting factors. Sridhar Fregoso is a 78 y.o. male who presents to the emergency department . Patient comes in with chest tightness/pressure. Patient admits that he was walking on his treadmill for approximately 10 minutes when the chest pressure started. He got off the treadmill and decided to mow the grass and attempt to make the chest pain go away. Patient has history of coronary artery disease and has 9 stents. Patient's cardiologist is Dr. Herbert Box. Patient took a couple nitroglycerin at home and chest discomfort is almost gone. He felt a little short of breath when the chest pressure was there but was not diaphoretic. No radiation of pain. HPI    Nursing Notes were reviewed. REVIEW OF SYSTEMS    (2-9 systems for level 4, 10 or more for level 5)     Review of Systems   Constitutional:  Negative for activity change, appetite change and fatigue. HENT:  Negative for congestion and sore throat. Eyes:  Negative for pain and visual disturbance. Respiratory:  Negative for chest tightness and shortness of breath. Cardiovascular:  Negative for chest pain. Gastrointestinal:  Negative for abdominal pain, nausea and vomiting. Endocrine: Negative for polydipsia. Genitourinary:  Negative for flank pain and urgency. Musculoskeletal:  Negative for gait problem and neck stiffness. Skin:  Negative for rash.    Neurological:  Negative for weakness, light-headedness up to max of 3 total doses. If no relief after 1 dose, call 911. PANTOPRAZOLE (PROTONIX) 40 MG TABLET    TAKE 1 TABLET DAILY    PSYLLIUM (METAMUCIL) 28 % PACKET    Take 1 packet by mouth 2 times daily Take with full glass of h20 or juice    ROSUVASTATIN (CRESTOR) 20 MG TABLET    Take 1 tablet by mouth nightly       ALLERGIES     Iodides    FAMILY HISTORY       Family History   Problem Relation Age of Onset    Cancer Father         prostate    Heart Disease Father     Cancer Brother         prostate    Heart Disease Brother     Heart Attack Brother     Heart Disease Paternal Uncle     Cancer Other     Stroke Mother     Heart Disease Brother     Heart Disease Brother     Heart Disease Brother     Heart Disease Brother     Colon Cancer Neg Hx           SOCIAL HISTORY       Social History     Socioeconomic History    Marital status:    Tobacco Use    Smoking status: Former     Packs/day: 1.00     Years: 51.00     Pack years: 51.00     Types: Cigarettes     Start date: 58     Quit date: 3/9/2011     Years since quittin.5    Smokeless tobacco: Never    Tobacco comments:     started age 17    Vaping Use    Vaping Use: Never used   Substance and Sexual Activity    Alcohol use:  Yes     Alcohol/week: 2.0 - 3.0 standard drinks     Types: 2 - 3 Cans of beer per week     Comment: 2-3 weeks, 2-4 drinks 2-3/per week    Drug use: Never    Sexual activity: Never     Social Determinants of Health     Physical Activity: Sufficiently Active    Days of Exercise per Week: 5 days    Minutes of Exercise per Session: 30 min       SCREENINGS        [place] Coma Scale  Eye Opening: Spontaneous  Best Verbal Response: Oriented  Best Motor Response: Obeys commands  [place] Coma Scale Score: 15               PHYSICAL EXAM    (up to 7 for level 4, 8 or more for level 5)     ED Triage Vitals [22 1237]   BP Temp Temp Source Heart Rate Resp SpO2 Height Weight   137/75 98.4 °F (36.9 °C) Oral 67 18 100 % 5' 4\" (1.626 m) 190 lb (86.2 kg)       Physical Exam  Vitals and nursing note reviewed. Constitutional:       General: He is not in acute distress. Appearance: He is well-developed. He is not diaphoretic. HENT:      Head: Normocephalic and atraumatic. Right Ear: External ear normal.      Left Ear: External ear normal.      Nose: Nose normal.      Mouth/Throat:      Mouth: Mucous membranes are moist.      Pharynx: No oropharyngeal exudate. Eyes:      Extraocular Movements: Extraocular movements intact. Conjunctiva/sclera: Conjunctivae normal.      Pupils: Pupils are equal, round, and reactive to light. Neck:      Thyroid: No thyromegaly. Vascular: No JVD. Trachea: No tracheal deviation. Cardiovascular:      Rate and Rhythm: Normal rate. Heart sounds: Normal heart sounds. No murmur heard. Pulmonary:      Effort: Pulmonary effort is normal. No respiratory distress. Breath sounds: Normal breath sounds. No wheezing. Abdominal:      General: Bowel sounds are normal.      Palpations: Abdomen is soft. Tenderness: There is no abdominal tenderness. There is no guarding. Musculoskeletal:         General: Normal range of motion. Cervical back: Normal range of motion and neck supple. Right lower leg: No edema. Left lower leg: No edema. Skin:     General: Skin is warm and dry. Findings: No rash. Neurological:      General: No focal deficit present. Mental Status: He is alert and oriented to person, place, and time. Cranial Nerves: No cranial nerve deficit.    Psychiatric:         Behavior: Behavior normal.       DIAGNOSTIC RESULTS     EKG: All EKG's are interpreted by the Emergency Department Physician who either signs or Co-signs this chart in the absence of a cardiologist.    Sinus rhythm 67 bpm no acute ischemia prolonged QT    RADIOLOGY:   Non-plain film images such as CT, Ultrasound and MRI are read by the radiologist. Plain radiographic images are visualized probability of clinically significant/life threatening deterioration in the patient's condition which required my urgent intervention. CONSULTS:  None    PROCEDURES:  Unless otherwise noted below, none     Procedures      FINAL IMPRESSION      1. Chest pain, unspecified type          DISPOSITION/PLAN   DISPOSITION Decision To Discharge 09/23/2022 03:28:25 PM      PATIENT REFERRED TO:  Benjie Michael DO  [address] [place] [address] [phone]      As needed    DISCHARGE MEDICATIONS:  New Prescriptions    No medications on file     Controlled Substances Monitoring:     No flowsheet data found.     (Please note that portions of this note were completed with a voice recognition program.  Efforts were made to edit the dictations but occasionally words are mis-transcribed.)    Nallely Blevins DO (electronically signed)  Attending Emergency Physician            Nallely Blevins DO  09/23/22 [facility],   09/23/22 [id number]

## 2022-10-03 ENCOUNTER — HOSPITAL ENCOUNTER (OUTPATIENT)
Dept: CT IMAGING | Age: 77
Discharge: HOME | End: 2022-10-05
Payer: MEDICARE

## 2022-10-03 DIAGNOSIS — Z87.891 PERSONAL HISTORY OF TOBACCO USE: ICD-10-CM

## 2022-10-03 PROCEDURE — 71271 CT THORAX LUNG CANCER SCR C-: CPT

## 2023-03-15 SDOH — HEALTH STABILITY: PHYSICAL HEALTH: ON AVERAGE, HOW MANY MINUTES DO YOU ENGAGE IN EXERCISE AT THIS LEVEL?: 30 MIN

## 2023-03-15 SDOH — HEALTH STABILITY: PHYSICAL HEALTH: ON AVERAGE, HOW MANY DAYS PER WEEK DO YOU ENGAGE IN MODERATE TO STRENUOUS EXERCISE (LIKE A BRISK WALK)?: 7 DAYS

## 2023-03-16 ENCOUNTER — OFFICE VISIT (OUTPATIENT)
Dept: PRIMARY CARE CLINIC | Age: 78
End: 2023-03-16

## 2023-03-16 VITALS
WEIGHT: 200.4 LBS | TEMPERATURE: 97.5 F | SYSTOLIC BLOOD PRESSURE: 122 MMHG | DIASTOLIC BLOOD PRESSURE: 68 MMHG | OXYGEN SATURATION: 99 % | BODY MASS INDEX: 34.21 KG/M2 | HEART RATE: 55 BPM | HEIGHT: 64 IN

## 2023-03-16 DIAGNOSIS — R73.09 ABNORMAL GLUCOSE: ICD-10-CM

## 2023-03-16 DIAGNOSIS — N18.31 STAGE 3A CHRONIC KIDNEY DISEASE (HCC): ICD-10-CM

## 2023-03-16 DIAGNOSIS — E53.8 LOW FOLATE: ICD-10-CM

## 2023-03-16 DIAGNOSIS — Z85.46 H/O PROSTATE CANCER: ICD-10-CM

## 2023-03-16 DIAGNOSIS — C61 MALIGNANT TUMOR OF PROSTATE (HCC): ICD-10-CM

## 2023-03-16 DIAGNOSIS — I25.10 CORONARY ARTERY DISEASE INVOLVING NATIVE CORONARY ARTERY OF NATIVE HEART WITHOUT ANGINA PECTORIS: ICD-10-CM

## 2023-03-16 DIAGNOSIS — I10 ESSENTIAL HYPERTENSION: ICD-10-CM

## 2023-03-16 DIAGNOSIS — I10 ESSENTIAL HYPERTENSION: Primary | ICD-10-CM

## 2023-03-16 DIAGNOSIS — Z60.4 SOCIAL ISOLATION: ICD-10-CM

## 2023-03-16 PROBLEM — Z77.29 EXPOSURE TO POTENTIALLY HAZARDOUS SUBSTANCE: Status: ACTIVE | Noted: 2023-03-16

## 2023-03-16 LAB
ALBUMIN SERPL-MCNC: 4.4 G/DL (ref 3.5–4.6)
ALP SERPL-CCNC: 75 U/L (ref 35–104)
ALT SERPL-CCNC: 21 U/L (ref 0–41)
ANION GAP SERPL CALCULATED.3IONS-SCNC: 10 MEQ/L (ref 9–15)
AST SERPL-CCNC: 23 U/L (ref 0–40)
BASOPHILS # BLD: 0 K/UL (ref 0–0.2)
BASOPHILS NFR BLD: 1.2 %
BILIRUB SERPL-MCNC: 1.2 MG/DL (ref 0.2–0.7)
BUN SERPL-MCNC: 19 MG/DL (ref 8–23)
CALCIUM SERPL-MCNC: 9.6 MG/DL (ref 8.5–9.9)
CHLORIDE SERPL-SCNC: 103 MEQ/L (ref 95–107)
CHOLEST SERPL-MCNC: 140 MG/DL (ref 0–199)
CO2 SERPL-SCNC: 28 MEQ/L (ref 20–31)
CREAT SERPL-MCNC: 1.44 MG/DL (ref 0.7–1.2)
EOSINOPHIL # BLD: 0.1 K/UL (ref 0–0.7)
EOSINOPHIL NFR BLD: 2.1 %
ERYTHROCYTE [DISTWIDTH] IN BLOOD BY AUTOMATED COUNT: 15.3 % (ref 11.5–14.5)
GLOBULIN SER CALC-MCNC: 2.3 G/DL (ref 2.3–3.5)
GLUCOSE SERPL-MCNC: 101 MG/DL (ref 70–99)
HBA1C MFR BLD: 5.5 % (ref 4.8–5.9)
HCT VFR BLD AUTO: 40.9 % (ref 42–52)
HDLC SERPL-MCNC: 59 MG/DL (ref 40–59)
HGB BLD-MCNC: 13.9 G/DL (ref 14–18)
LDLC SERPL CALC-MCNC: 53 MG/DL (ref 0–129)
LYMPHOCYTES # BLD: 0.7 K/UL (ref 1–4.8)
LYMPHOCYTES NFR BLD: 18.3 %
MCH RBC QN AUTO: 31.1 PG (ref 27–31.3)
MCHC RBC AUTO-ENTMCNC: 34 % (ref 33–37)
MCV RBC AUTO: 91.5 FL (ref 79–92.2)
MONOCYTES # BLD: 0.4 K/UL (ref 0.2–0.8)
MONOCYTES NFR BLD: 10.9 %
NEUTROPHILS # BLD: 2.8 K/UL (ref 1.4–6.5)
NEUTS SEG NFR BLD: 67.5 %
PLATELET # BLD AUTO: 124 K/UL (ref 130–400)
POTASSIUM SERPL-SCNC: 5 MEQ/L (ref 3.4–4.9)
PROT SERPL-MCNC: 6.7 G/DL (ref 6.3–8)
PSA SERPL-MCNC: 0.02 NG/ML (ref 0–4)
RBC # BLD AUTO: 4.47 M/UL (ref 4.7–6.1)
SODIUM SERPL-SCNC: 141 MEQ/L (ref 135–144)
TRIGL SERPL-MCNC: 142 MG/DL (ref 0–150)
WBC # BLD AUTO: 4.1 K/UL (ref 4.8–10.8)

## 2023-03-16 RX ORDER — ISOSORBIDE MONONITRATE 60 MG/1
TABLET, EXTENDED RELEASE ORAL
COMMUNITY
Start: 2023-02-04

## 2023-03-16 SDOH — SOCIAL STABILITY - SOCIAL INSECURITY: SOCIAL EXCLUSION AND REJECTION: Z60.4

## 2023-03-16 SDOH — ECONOMIC STABILITY: HOUSING INSECURITY
IN THE LAST 12 MONTHS, WAS THERE A TIME WHEN YOU DID NOT HAVE A STEADY PLACE TO SLEEP OR SLEPT IN A SHELTER (INCLUDING NOW)?: NO

## 2023-03-16 SDOH — ECONOMIC STABILITY: FOOD INSECURITY: WITHIN THE PAST 12 MONTHS, THE FOOD YOU BOUGHT JUST DIDN'T LAST AND YOU DIDN'T HAVE MONEY TO GET MORE.: NEVER TRUE

## 2023-03-16 SDOH — ECONOMIC STABILITY: FOOD INSECURITY: WITHIN THE PAST 12 MONTHS, YOU WORRIED THAT YOUR FOOD WOULD RUN OUT BEFORE YOU GOT MONEY TO BUY MORE.: NEVER TRUE

## 2023-03-16 SDOH — ECONOMIC STABILITY: INCOME INSECURITY: HOW HARD IS IT FOR YOU TO PAY FOR THE VERY BASICS LIKE FOOD, HOUSING, MEDICAL CARE, AND HEATING?: NOT VERY HARD

## 2023-03-16 ASSESSMENT — PATIENT HEALTH QUESTIONNAIRE - PHQ9
SUM OF ALL RESPONSES TO PHQ QUESTIONS 1-9: 5
9. THOUGHTS THAT YOU WOULD BE BETTER OFF DEAD, OR OF HURTING YOURSELF: 0
8. MOVING OR SPEAKING SO SLOWLY THAT OTHER PEOPLE COULD HAVE NOTICED. OR THE OPPOSITE, BEING SO FIGETY OR RESTLESS THAT YOU HAVE BEEN MOVING AROUND A LOT MORE THAN USUAL: 0
4. FEELING TIRED OR HAVING LITTLE ENERGY: 0
2. FEELING DOWN, DEPRESSED OR HOPELESS: 2
1. LITTLE INTEREST OR PLEASURE IN DOING THINGS: 0
3. TROUBLE FALLING OR STAYING ASLEEP: 3
6. FEELING BAD ABOUT YOURSELF - OR THAT YOU ARE A FAILURE OR HAVE LET YOURSELF OR YOUR FAMILY DOWN: 0
10. IF YOU CHECKED OFF ANY PROBLEMS, HOW DIFFICULT HAVE THESE PROBLEMS MADE IT FOR YOU TO DO YOUR WORK, TAKE CARE OF THINGS AT HOME, OR GET ALONG WITH OTHER PEOPLE: 0
SUM OF ALL RESPONSES TO PHQ QUESTIONS 1-9: 5
SUM OF ALL RESPONSES TO PHQ QUESTIONS 1-9: 5
SUM OF ALL RESPONSES TO PHQ9 QUESTIONS 1 & 2: 2
5. POOR APPETITE OR OVEREATING: 0
7. TROUBLE CONCENTRATING ON THINGS, SUCH AS READING THE NEWSPAPER OR WATCHING TELEVISION: 0
SUM OF ALL RESPONSES TO PHQ QUESTIONS 1-9: 5

## 2023-03-16 NOTE — PROGRESS NOTES
Subjective:      Patient ID: Aleksander Weller is a 70 y.o. male who presents today for:  Chief Complaint   Patient presents with    Establish Care       HPI patient presents for establishment of care. Has a significant medical history of hypertension, elevated lipids, coronary artery disease, remote prostate cancer and GERD. Patient is followed by Dr. Sushil Graham and undergoes 6-month PSA checks. He is currently off hormone therapy. His blood pressure readings at home are variable but generally good. He tries to adhere to a low-salt diet. Physical activity has been limited lately due to weather. He tends to avoid social events and going to crowded places. He feels this has affected his life negatively and is interested in speaking with therapist.  Occasionally has mild low mood. No thoughts of hurting self or others. Sleep is good. No paranoia or hallucinations. No panic. Complains of no chest pain, difficulty breathing or palpitations. He has only needed his nitroglycerin about once every 4 to 6 months. He is followed by Dr. Lara Hernandez. He complains of no heartburn or reflux. No abdominal pain.   Past Medical History:   Diagnosis Date    Cancer [facility])     prostate    Hyperlipidemia     Hypertension      Past Surgical History:   Procedure Laterality Date    CARDIAC CATHETERIZATION  12/2019    CATARACT REMOVAL WITH IMPLANT Bilateral 06/2013    COLONOSCOPY  04/2016    COLONOSCOPY N/A 4/15/2021    COLORECTAL CANCER SCREENING, HIGH RISK performed by Terrie Jimenez MD at [address] [place] [address]  06/1998    CORONARY ANGIOPLASTY WITH STENT PLACEMENT  02/2007    CORONARY ANGIOPLASTY WITH STENT PLACEMENT  01/2009    CORONARY ANGIOPLASTY WITH STENT PLACEMENT  07/2012    CYSTOSCOPY  04/15/2021    HEMORRHOID SURGERY  11/2013    INGUINAL HERNIA REPAIR  05/1990    PROSTATE SURGERY  06/2003    removed    PROSTATE SURGERY  09/2013    biopsy    UPPER GASTROINTESTINAL ENDOSCOPY 2018    UPPER GASTROINTESTINAL ENDOSCOPY N/A 4/15/2021    EGD DIAGNOSTIC ONLY performed by Holli Hall MD at [address] [facility]  04/15/2021    VASECTOMY       Social History     Socioeconomic History    Marital status:      Spouse name: Not on file    Number of children: Not on file    Years of education: Not on file    Highest education level: Not on file   Occupational History    Not on file   Tobacco Use    Smoking status: Former     Packs/day: 1.00     Years: 51.00     Pack years: 51.00     Types: Cigarettes     Start date: 58     Quit date: 3/9/2011     Years since quittin.0    Smokeless tobacco: Never    Tobacco comments:     started age 17    Vaping Use    Vaping Use: Never used   Substance and Sexual Activity    Alcohol use: Yes     Alcohol/week: 2.0 - 3.0 standard drinks     Types: 2 - 3 Cans of beer per week     Comment: 2-3 weeks, 2-4 drinks 2-3/per week    Drug use: Never    Sexual activity: Never   Other Topics Concern    Not on file   Social History Narrative    Not on file     Social Determinants of Health     Financial Resource Strain: Low Risk     Difficulty of Paying Living Expenses: Not very hard   Food Insecurity: No Food Insecurity    Worried About Running Out of Food in the Last Year: Never true    Ran Out of Food in the Last Year: Never true   Transportation Needs: Unknown    Lack of Transportation (Medical): Not on file    Lack of Transportation (Non-Medical):  No   Physical Activity: Sufficiently Active    Days of Exercise per Week: 7 days    Minutes of Exercise per Session: 30 min   Stress: Not on file   Social Connections: Not on file   Intimate Partner Violence: Not At Risk    Fear of Current or Ex-Partner: No    Emotionally Abused: No    Physically Abused: No    Sexually Abused: No   Housing Stability: Unknown    Unable to Pay for Housing in the Last Year: Not on file    Number of Places Lived in the Last Year: Not on file    Unstable Housing in the Last Year: No     Family History   Problem Relation Age of Onset    Cancer Father         prostate    Heart Disease Father     Cancer Brother         prostate    Heart Disease Brother     Heart Attack Brother     Heart Disease Paternal Uncle     Cancer Other     Stroke Mother     Heart Disease Brother     Heart Disease Brother     Heart Disease Brother     Heart Disease Brother     Colon Cancer Neg Hx      Allergies   Allergen Reactions    Iodides Nausea And Vomiting     Contrast Dye         Review of Systems   Constitutional: Negative. HENT: Negative. Eyes: Negative. Respiratory: Negative. Cardiovascular: Negative. Gastrointestinal: Negative. Genitourinary: Negative. Musculoskeletal: Negative. Skin: Negative. Neurological: Negative. Psychiatric/Behavioral:  Positive for dysphoric mood. Negative for confusion, decreased concentration, hallucinations, self-injury, sleep disturbance and suicidal ideas. Objective:   /68 (Site: Left Upper Arm, Position: Sitting, Cuff Size: Large Adult)   Pulse 55   Temp 97.5 °F (36.4 °C) (Temporal)   Ht 5' 4\" (1.626 m)   Wt 200 lb 6.4 oz (90.9 kg)   SpO2 99%   BMI 34.40 kg/m²     Physical Exam  Vitals reviewed. Constitutional:       General: He is not in acute distress. Appearance: Normal appearance. He is obese. HENT:      Head: Normocephalic. Mouth/Throat:      Mouth: Mucous membranes are moist.      Pharynx: Oropharynx is clear. No oropharyngeal exudate or posterior oropharyngeal erythema. Eyes:      Extraocular Movements: Extraocular movements intact. Conjunctiva/sclera: Conjunctivae normal.      Pupils: Pupils are equal, round, and reactive to light. Cardiovascular:      Rate and Rhythm: Normal rate and regular rhythm. Heart sounds: Normal heart sounds. Pulmonary:      Effort: Pulmonary effort is normal. No respiratory distress. Breath sounds: Normal breath sounds. No wheezing. Abdominal:      General: Abdomen is flat. Bowel sounds are normal. There is no distension. Palpations: Abdomen is soft. There is no mass. Tenderness: There is no abdominal tenderness. There is no guarding. Musculoskeletal:         General: Normal range of motion. Cervical back: Normal range of motion and neck supple. Right lower leg: No edema. Left lower leg: No edema. Lymphadenopathy:      Cervical: No cervical adenopathy. Skin:     General: Skin is warm and dry. Findings: No rash. Neurological:      General: No focal deficit present. Mental Status: He is alert and oriented to person, place, and time. Cranial Nerves: No cranial nerve deficit. Sensory: No sensory deficit. Motor: No weakness. Psychiatric:         Mood and Affect: Mood normal.         Behavior: Behavior normal.         Thought Content: Thought content normal.         Judgment: Judgment normal.       Assessment:       Diagnosis Orders   1. Essential hypertension  CBC with Auto Differential    Comprehensive Metabolic Panel      2. Abnormal glucose  Hemoglobin A1C      3. Coronary artery disease involving native coronary artery of native heart without angina pectoris  Lipid Panel    CBC with Auto Differential      4. Low folate  CBC with Auto Differential    Vitamin B12 & Folate      5. Social isolation  [address] [address], [facility], [facility]      6. H/O prostate cancer  PSA, Diagnostic      7.  Stage 3a chronic kidney disease ([facility] [address]  Comprehensive Metabolic Panel            Plan:      Orders Placed This Encounter   Procedures    PSA, Diagnostic     Standing Status:   Future     Number of Occurrences:   1     Standing Expiration Date:   3/16/2024    Lipid Panel     Standing Status:   Future     Number of Occurrences:   1     Standing Expiration Date:   3/15/2024     Order Specific Question:   Is Patient Fasting?/# of Hours     Answer:   10    CBC with Auto Differential     Standing Status:   Future     Number of Occurrences:   1     Standing Expiration Date:   3/16/2024    Hemoglobin A1C     Standing Status:   Future     Number of Occurrences:   1     Standing Expiration Date:   3/16/2024    Vitamin B12 & Folate     Standing Status:   Future     Number of Occurrences:   1     Standing Expiration Date:   3/16/2024    Comprehensive Metabolic Panel     Standing Status:   Future     Number of Occurrences:   1     Standing Expiration Date:   3/15/2024    [address], Psychology, [facility]     Referral Priority:   Routine     Referral Type:   Eval and Treat     Referral Reason:   Specialty Services Required     Referred to Provider:   Sondra Bryant PSYD     Requested Specialty:   Psychology     Number of Visits Requested:   1     No orders of the defined types were placed in this encounter. Return in about 6 months (around 9/16/2023). We will check labs due to history of hypertension, chronic kidney disease and abnormal glucose. Patient has not been taking his folic acid for the past month or 2. We will check folic acid level. Patient has history of prostate cancer we will check PSA, encourage patient to follow-up with Dr. Pk Henson. Referral made to psychology regarding social isolation and occasional low mood. Patient is not interested in considering antidepressant medication at this time. Risk and benefits discussed. Encourage patient to get plenty of physical exercise and to eat a healthy diet that is low in sodium, processed food, saturated fat and cholesterol.   Patient declines seeing nutritionist.      Yoni Venegas MD

## 2023-03-17 DIAGNOSIS — R79.89 ELEVATED SERUM CREATININE: Primary | ICD-10-CM

## 2023-03-17 ASSESSMENT — ENCOUNTER SYMPTOMS
RESPIRATORY NEGATIVE: 1
GASTROINTESTINAL NEGATIVE: 1
EYES NEGATIVE: 1

## 2023-03-17 NOTE — PROGRESS NOTES
Recent labs show elevated creatinine, slightly elevated potassium and bilirubin. Recommend repeat CMP. Patient to be made aware.

## 2023-03-29 LAB
FOLATE: 11 NG/ML
VITAMIN B-12: 449 PG/ML (ref 232–1245)

## 2023-04-17 DIAGNOSIS — I25.10 CORONARY ARTERY DISEASE INVOLVING NATIVE CORONARY ARTERY OF NATIVE HEART WITHOUT ANGINA PECTORIS: Primary | ICD-10-CM

## 2023-04-17 NOTE — TELEPHONE ENCOUNTER
Requesting medication refill. Please approve or deny this request.    Rx requested:  Requested Prescriptions     Pending Prescriptions Disp Refills    isosorbide mononitrate (IMDUR) 60 MG extended release tablet [Pharmacy Med Name: ISOSORBIDE MONONITRATE ER TABS 60MG] 180 tablet 3     Sig: TAKE 2 TABLETS DAILY         Last Office Visit:   4/12/20222      Next Visit Date:  Future Appointments   Date Time Provider Thiago Jeter   4/18/2023  8:45 AM Sherrie Guardado DO [facility]   4/25/2023  9:30 AM Jin Moody MD [facility]   9/14/2023  8:30 AM Jin Moody MD [facility]               Last refill 2/4/2023. Please approve or deny.

## 2023-04-18 ENCOUNTER — OFFICE VISIT (OUTPATIENT)
Dept: CARDIOLOGY CLINIC | Age: 78
End: 2023-04-18
Payer: MEDICARE

## 2023-04-18 VITALS
OXYGEN SATURATION: 96 % | SYSTOLIC BLOOD PRESSURE: 124 MMHG | HEIGHT: 64 IN | DIASTOLIC BLOOD PRESSURE: 80 MMHG | BODY MASS INDEX: 34.49 KG/M2 | RESPIRATION RATE: 15 BRPM | WEIGHT: 202 LBS | HEART RATE: 70 BPM

## 2023-04-18 DIAGNOSIS — R07.2 PRECORDIAL PAIN: ICD-10-CM

## 2023-04-18 DIAGNOSIS — I25.10 CORONARY ARTERY DISEASE INVOLVING NATIVE CORONARY ARTERY OF NATIVE HEART WITHOUT ANGINA PECTORIS: Primary | ICD-10-CM

## 2023-04-18 DIAGNOSIS — N18.30 STAGE 3 CHRONIC KIDNEY DISEASE, UNSPECIFIED WHETHER STAGE 3A OR 3B CKD (HCC): ICD-10-CM

## 2023-04-18 DIAGNOSIS — R94.31 ABNORMAL EKG: ICD-10-CM

## 2023-04-18 DIAGNOSIS — I10 ESSENTIAL HYPERTENSION: Chronic | ICD-10-CM

## 2023-04-18 PROCEDURE — 1036F TOBACCO NON-USER: CPT | Performed by: INTERNAL MEDICINE

## 2023-04-18 PROCEDURE — G8417 CALC BMI ABV UP PARAM F/U: HCPCS | Performed by: INTERNAL MEDICINE

## 2023-04-18 PROCEDURE — 93000 ELECTROCARDIOGRAM COMPLETE: CPT | Performed by: INTERNAL MEDICINE

## 2023-04-18 PROCEDURE — 3079F DIAST BP 80-89 MM HG: CPT | Performed by: INTERNAL MEDICINE

## 2023-04-18 PROCEDURE — 3074F SYST BP LT 130 MM HG: CPT | Performed by: INTERNAL MEDICINE

## 2023-04-18 PROCEDURE — 99214 OFFICE O/P EST MOD 30 MIN: CPT | Performed by: INTERNAL MEDICINE

## 2023-04-18 PROCEDURE — G8427 DOCREV CUR MEDS BY ELIG CLIN: HCPCS | Performed by: INTERNAL MEDICINE

## 2023-04-18 PROCEDURE — 1123F ACP DISCUSS/DSCN MKR DOCD: CPT | Performed by: INTERNAL MEDICINE

## 2023-04-18 RX ORDER — ISOSORBIDE MONONITRATE 60 MG/1
60 TABLET, EXTENDED RELEASE ORAL 2 TIMES DAILY
Qty: 180 TABLET | Refills: 3 | Status: SHIPPED | OUTPATIENT
Start: 2023-04-18

## 2023-04-18 RX ORDER — ISOSORBIDE MONONITRATE 60 MG/1
TABLET, EXTENDED RELEASE ORAL
Qty: 180 TABLET | Refills: 3 | Status: SHIPPED | OUTPATIENT
Start: 2023-04-18

## 2023-04-25 ENCOUNTER — OFFICE VISIT (OUTPATIENT)
Dept: PRIMARY CARE CLINIC | Age: 78
End: 2023-04-25

## 2023-04-25 VITALS
BODY MASS INDEX: 34.31 KG/M2 | DIASTOLIC BLOOD PRESSURE: 78 MMHG | HEIGHT: 64 IN | TEMPERATURE: 97.7 F | OXYGEN SATURATION: 96 % | SYSTOLIC BLOOD PRESSURE: 120 MMHG | WEIGHT: 201 LBS | HEART RATE: 55 BPM

## 2023-04-25 DIAGNOSIS — N18.30 STAGE 3 CHRONIC KIDNEY DISEASE, UNSPECIFIED WHETHER STAGE 3A OR 3B CKD (HCC): ICD-10-CM

## 2023-04-25 DIAGNOSIS — Z85.46 H/O PROSTATE CANCER: Chronic | ICD-10-CM

## 2023-04-25 DIAGNOSIS — D64.9 ANEMIA, UNSPECIFIED TYPE: Primary | ICD-10-CM

## 2023-04-25 DIAGNOSIS — Z12.5 ENCOUNTER FOR SCREENING FOR MALIGNANT NEOPLASM OF PROSTATE: ICD-10-CM

## 2023-04-25 DIAGNOSIS — I10 ESSENTIAL HYPERTENSION: Chronic | ICD-10-CM

## 2023-04-25 DIAGNOSIS — D64.9 ANEMIA, UNSPECIFIED TYPE: ICD-10-CM

## 2023-04-25 LAB
BILIRUB UR QL STRIP: NEGATIVE
CLARITY UR: CLEAR
COLOR UR: YELLOW
GLUCOSE UR STRIP-MCNC: NEGATIVE MG/DL
HGB UR QL STRIP: NEGATIVE
KETONES UR STRIP-MCNC: NEGATIVE MG/DL
LEUKOCYTE ESTERASE UR QL STRIP: NEGATIVE
NITRITE UR QL STRIP: NEGATIVE
PH UR STRIP: 6.5 [PH] (ref 5–9)
PROT UR STRIP-MCNC: NEGATIVE MG/DL
SP GR UR STRIP: 1.01 (ref 1–1.03)
UROBILINOGEN UR STRIP-ACNC: 0.2 E.U./DL

## 2023-04-25 NOTE — PROGRESS NOTES
Subjective:      Patient ID: Josselin Powers is a 70 y.o. male who presents today for:  Chief Complaint   Patient presents with    Follow-up    Discuss Labs       HPI patient is in today for review. He states he is seen by cardiology and had abnormal EKG- getting stress test per Dr Toro Azul. He currently complains of no chest pain, shortness of breath, palpitations, dizziness, or diaphoresis. Occasional cramping in his lower calves at rest.  He has not seen nephrology in the past.  Previous urinalysis showed mild blood. He has a history of prostate cancer with surgical resection- has PSA every 6 months.     Past Medical History:   Diagnosis Date    Abnormal EKG 4/18/2023    Cancer ([address]     prostate    Hyperlipidemia     Hyperlipidemia 9/17/2020    Hypertension      Past Surgical History:   Procedure Laterality Date    CARDIAC CATHETERIZATION  12/2019    CATARACT REMOVAL WITH IMPLANT Bilateral 06/2013    COLONOSCOPY  04/2016    COLONOSCOPY N/A 4/15/2021    COLORECTAL CANCER SCREENING, HIGH RISK performed by Geo Rubio MD at [address] [place] [address]  06/1998    CORONARY ANGIOPLASTY WITH STENT PLACEMENT  02/2007    CORONARY ANGIOPLASTY WITH STENT PLACEMENT  01/2009    CORONARY ANGIOPLASTY WITH STENT PLACEMENT  07/2012    CYSTOSCOPY  04/15/2021    HEMORRHOID SURGERY  11/2013    INGUINAL HERNIA REPAIR  05/1990    PROSTATE SURGERY  06/2003    removed    PROSTATE SURGERY  09/2013    biopsy    UPPER GASTROINTESTINAL ENDOSCOPY  09/2018    UPPER GASTROINTESTINAL ENDOSCOPY N/A 4/15/2021    EGD DIAGNOSTIC ONLY performed by Geo Rubio MD at [address] [place] [address]  04/15/2021    VASECTOMY       Social History     Socioeconomic History    Marital status:      Spouse name: Not on file    Number of children: Not on file    Years of education: Not on file    Highest education level: Not on file   Occupational History    Not on file   Tobacco Use

## 2023-04-26 LAB
FERRITIN: 136 NG/ML (ref 30–400)
IRON SATURATION: 32 % (ref 20–55)
IRON: 108 UG/DL (ref 59–158)
TOTAL IRON BINDING CAPACITY: 342 UG/DL (ref 250–450)
UNSATURATED IRON BINDING CAPACITY: 234 UG/DL (ref 112–347)

## 2023-04-26 ASSESSMENT — ENCOUNTER SYMPTOMS
RESPIRATORY NEGATIVE: 1
GASTROINTESTINAL NEGATIVE: 1

## 2023-06-02 ENCOUNTER — OFFICE VISIT (OUTPATIENT)
Dept: FAMILY MEDICINE CLINIC | Age: 78
End: 2023-06-02

## 2023-06-02 DIAGNOSIS — Z00.00 MEDICARE ANNUAL WELLNESS VISIT, SUBSEQUENT: Primary | ICD-10-CM

## 2023-06-02 SDOH — ECONOMIC STABILITY: INCOME INSECURITY: IN THE LAST 12 MONTHS, WAS THERE A TIME WHEN YOU WERE NOT ABLE TO PAY THE MORTGAGE OR RENT ON TIME?: NO

## 2023-06-02 SDOH — ECONOMIC STABILITY: HOUSING INSECURITY: IN THE LAST 12 MONTHS, HOW MANY PLACES HAVE YOU LIVED?: 1

## 2023-06-02 ASSESSMENT — LIFESTYLE VARIABLES
HOW OFTEN DURING THE LAST YEAR HAVE YOU FOUND THAT YOU WERE NOT ABLE TO STOP DRINKING ONCE YOU HAD STARTED: 0
HOW OFTEN DURING THE LAST YEAR HAVE YOU HAD A FEELING OF GUILT OR REMORSE AFTER DRINKING: 0
HOW OFTEN DO YOU HAVE A DRINK CONTAINING ALCOHOL: 4 OR MORE TIMES A WEEK
HAS A RELATIVE, FRIEND, DOCTOR, OR ANOTHER HEALTH PROFESSIONAL EXPRESSED CONCERN ABOUT YOUR DRINKING OR SUGGESTED YOU CUT DOWN: 0
HOW OFTEN DURING THE LAST YEAR HAVE YOU NEEDED AN ALCOHOLIC DRINK FIRST THING IN THE MORNING TO GET YOURSELF GOING AFTER A NIGHT OF HEAVY DRINKING: 0
HOW MANY STANDARD DRINKS CONTAINING ALCOHOL DO YOU HAVE ON A TYPICAL DAY: 3 OR 4
HOW OFTEN DURING THE LAST YEAR HAVE YOU BEEN UNABLE TO REMEMBER WHAT HAPPENED THE NIGHT BEFORE BECAUSE YOU HAD BEEN DRINKING: 0
HAVE YOU OR SOMEONE ELSE BEEN INJURED AS A RESULT OF YOUR DRINKING: 0
HOW OFTEN DURING THE LAST YEAR HAVE YOU FAILED TO DO WHAT WAS NORMALLY EXPECTED FROM YOU BECAUSE OF DRINKING: 0

## 2023-06-02 ASSESSMENT — PATIENT HEALTH QUESTIONNAIRE - PHQ9
2. FEELING DOWN, DEPRESSED OR HOPELESS: 2
7. TROUBLE CONCENTRATING ON THINGS, SUCH AS READING THE NEWSPAPER OR WATCHING TELEVISION: 0
8. MOVING OR SPEAKING SO SLOWLY THAT OTHER PEOPLE COULD HAVE NOTICED. OR THE OPPOSITE, BEING SO FIGETY OR RESTLESS THAT YOU HAVE BEEN MOVING AROUND A LOT MORE THAN USUAL: 0
10. IF YOU CHECKED OFF ANY PROBLEMS, HOW DIFFICULT HAVE THESE PROBLEMS MADE IT FOR YOU TO DO YOUR WORK, TAKE CARE OF THINGS AT HOME, OR GET ALONG WITH OTHER PEOPLE: 0
SUM OF ALL RESPONSES TO PHQ QUESTIONS 1-9: 5
SUM OF ALL RESPONSES TO PHQ QUESTIONS 1-9: 5
4. FEELING TIRED OR HAVING LITTLE ENERGY: 0
6. FEELING BAD ABOUT YOURSELF - OR THAT YOU ARE A FAILURE OR HAVE LET YOURSELF OR YOUR FAMILY DOWN: 0
SUM OF ALL RESPONSES TO PHQ QUESTIONS 1-9: 5
9. THOUGHTS THAT YOU WOULD BE BETTER OFF DEAD, OR OF HURTING YOURSELF: 0
3. TROUBLE FALLING OR STAYING ASLEEP: 0
SUM OF ALL RESPONSES TO PHQ9 QUESTIONS 1 & 2: 5
SUM OF ALL RESPONSES TO PHQ QUESTIONS 1-9: 5
5. POOR APPETITE OR OVEREATING: 0
1. LITTLE INTEREST OR PLEASURE IN DOING THINGS: 3

## 2023-06-02 NOTE — PROGRESS NOTES
Medicare Annual Wellness Visit    Zackery Medina is here for Medicare AWV    Assessment & Plan   Medicare annual wellness visit, subsequent  Recommendations for Preventive Services Due: see orders and patient instructions/AVS.  Recommended screening schedule for the next 5-10 years is provided to the patient in written form: see Patient Instructions/AVS.     No follow-ups on file. Subjective       Patient's complete Health Risk Assessment and screening values have been reviewed and are found in Flowsheets. The following problems were reviewed today and where indicated follow up appointments were made and/or referrals ordered. Positive Risk Factor Screenings with Interventions:    Fall Risk:  Do you feel unsteady or are you worried about falling? : (!) yes (Feel slghtly unsteady when first getting to standing position. Does not feel he needs any devices for balance.)  2 or more falls in past year?: no  Fall with injury in past year?: no     Interventions:    Patient declines any further evaluation or treatment     Depression:  PHQ-2 Score: 5  PHQ-9 Total Score: 5    Interpretation:   1-4 = minimal  5-9 = mild  10-14 = moderate  15-19 = moderately severe  20-27 = severe  Interventions:  Patient has referral from PCP    Alcohol Screening:  Alcohol Use: Heavy Drinker    Frequency of Alcohol Consumption: 4 or more times a week    Average Number of Drinks: 3 or 4    Frequency of Binge Drinking: Never      AUDIT-C Score: 5   AUDIT Total Score: 5    Interpretation of AUDIT-C score:   3-7 indicates potential alcohol risk. 8 or more is associated with harmful or hazardous drinking. 15 or more in women, and 15 or more in men, is likely to indicate alcohol dependence.   Interventions:  Patient declined any further intervention or treatment             General HRA Questions:  Select all that apply: (!) New or Increased Pain, Social Isolation    Pain Interventions:  Patient declined any further interventions or

## 2023-06-02 NOTE — PATIENT INSTRUCTIONS
6/2/2023  Medicare offers a range of preventive health benefits. Some of the tests and screenings are paid in full while other may be subject to a deductible, co-insurance, and/or copay. Some of these benefits include a comprehensive review of your medical history including lifestyle, illnesses that may run in your family, and various assessments and screenings as appropriate. After reviewing your medical record and screening and assessments performed today your provider may have ordered immunizations, labs, imaging, and/or referrals for you. A list of these orders (if applicable) as well as your Preventive Care list are included within your After Visit Summary for your review. Other Preventive Recommendations:    A preventive eye exam performed by an eye specialist is recommended every 1-2 years to screen for glaucoma; cataracts, macular degeneration, and other eye disorders. A preventive dental visit is recommended every 6 months. Try to get at least 150 minutes of exercise per week or 10,000 steps per day on a pedometer . Order or download the FREE \"Exercise & Physical Activity: Your Everyday Guide\" from [employer]. Call 4-961.828.9781 or search [employer] online. You need 7939-9409 mg of calcium and 5537-6546 IU of vitamin D per day. It is possible to meet your calcium requirement with diet alone, but a vitamin D supplement is usually necessary to meet this goal.  When exposed to the sun, use a sunscreen that protects against both UVA and UVB radiation with an SPF of 30 or greater. Reapply every 2 to 3 hours or after sweating, drying off with a towel, or swimming. Always wear a seat belt when traveling in a car. Always wear a helmet when riding a bicycle or motorcycle.

## 2023-06-07 ENCOUNTER — HOSPITAL ENCOUNTER (OUTPATIENT)
Dept: NON INVASIVE DIAGNOSTICS | Age: 78
Discharge: HOME | End: 2023-06-07
Payer: MEDICARE

## 2023-06-07 ENCOUNTER — HOSPITAL ENCOUNTER (OUTPATIENT)
Dept: NUCLEAR MEDICINE | Age: 78
Discharge: HOME | End: 2023-06-09
Payer: MEDICARE

## 2023-06-07 DIAGNOSIS — R07.2 PRECORDIAL PAIN: ICD-10-CM

## 2023-06-07 DIAGNOSIS — R94.31 ABNORMAL EKG: ICD-10-CM

## 2023-06-07 LAB
LV EF: 58 %
LV EF: 68 %
LVEF MODALITY: NORMAL
LVEF MODALITY: NORMAL

## 2023-06-07 PROCEDURE — A9502 TC99M TETROFOSMIN: HCPCS | Performed by: INTERNAL MEDICINE

## 2023-06-07 PROCEDURE — 93306 TTE W/DOPPLER COMPLETE: CPT

## 2023-06-07 PROCEDURE — 2580000003 HC RX 258: Performed by: INTERNAL MEDICINE

## 2023-06-07 PROCEDURE — 93018 CV STRESS TEST I&R ONLY: CPT | Performed by: INTERNAL MEDICINE

## 2023-06-07 PROCEDURE — 78452 HT MUSCLE IMAGE SPECT MULT: CPT

## 2023-06-07 PROCEDURE — 93017 CV STRESS TEST TRACING ONLY: CPT

## 2023-06-07 PROCEDURE — 3430000000 HC RX DIAGNOSTIC RADIOPHARMACEUTICAL: Performed by: INTERNAL MEDICINE

## 2023-06-07 PROCEDURE — 6360000002 HC RX W HCPCS: Performed by: INTERNAL MEDICINE

## 2023-06-07 RX ORDER — REGADENOSON 0.08 MG/ML
0.4 INJECTION, SOLUTION INTRAVENOUS
Status: COMPLETED | OUTPATIENT
Start: 2023-06-07 | End: 2023-06-07

## 2023-06-07 RX ORDER — SODIUM CHLORIDE 0.9 % (FLUSH) 0.9 %
10 SYRINGE (ML) INJECTION PRN
Status: DISCONTINUED | OUTPATIENT
Start: 2023-06-07 | End: 2023-06-10 | Stop reason: HOSPADM

## 2023-06-07 RX ADMIN — REGADENOSON 0.4 MG: 0.08 INJECTION, SOLUTION INTRAVENOUS at 10:29

## 2023-06-07 RX ADMIN — Medication 10 ML: at 10:29

## 2023-06-07 RX ADMIN — TETROFOSMIN 35.7 MILLICURIE: 1.38 INJECTION, POWDER, LYOPHILIZED, FOR SOLUTION INTRAVENOUS at 10:30

## 2023-06-07 RX ADMIN — Medication 10 ML: at 10:30

## 2023-06-07 RX ADMIN — TETROFOSMIN 11.5 MILLICURIE: 1.38 INJECTION, POWDER, LYOPHILIZED, FOR SOLUTION INTRAVENOUS at 08:50

## 2023-06-07 RX ADMIN — Medication 10 ML: at 08:50

## 2023-06-07 NOTE — PROGRESS NOTES
Reviewed history, allergies, and medications. BP to high for treadmill testing. Consent confirmed. Lexiscan exam explained. Placed patient on monitor. @   Nuclear Medicine tech here to inject [employer]. SOB noted during recovery phase. Denied chest pain. No ectopy noted. Doctor to further review and interpret results. Patient off monitor and instructed to eat, will have last part of exam in 1 hour.

## 2023-06-21 ENCOUNTER — TRANSCRIBE ORDERS (OUTPATIENT)
Dept: ADMINISTRATIVE | Age: 78
End: 2023-06-21

## 2023-06-21 DIAGNOSIS — N18.31 CHRONIC KIDNEY DISEASE (CKD) STAGE G3A/A1, MODERATELY DECREASED GLOMERULAR FILTRATION RATE (GFR) BETWEEN 45-59 ML/MIN/1.73 SQUARE METER AND ALBUMINURIA CREATININE RATIO LESS THAN 30 MG/G (HCC): Primary | ICD-10-CM

## 2023-07-03 ENCOUNTER — HOSPITAL ENCOUNTER (OUTPATIENT)
Dept: ULTRASOUND IMAGING | Age: 78
Discharge: HOME | End: 2023-07-05
Attending: INTERNAL MEDICINE
Payer: MEDICARE

## 2023-07-03 DIAGNOSIS — N18.31 CHRONIC KIDNEY DISEASE (CKD) STAGE G3A/A1, MODERATELY DECREASED GLOMERULAR FILTRATION RATE (GFR) BETWEEN 45-59 ML/MIN/1.73 SQUARE METER AND ALBUMINURIA CREATININE RATIO LESS THAN 30 MG/G (HCC): ICD-10-CM

## 2023-07-03 PROCEDURE — 76770 US EXAM ABDO BACK WALL COMP: CPT

## 2023-07-06 ENCOUNTER — OFFICE VISIT (OUTPATIENT)
Dept: FAMILY MEDICINE CLINIC | Age: 78
End: 2023-07-06
Payer: MEDICARE

## 2023-07-06 VITALS
OXYGEN SATURATION: 97 % | HEIGHT: 64 IN | DIASTOLIC BLOOD PRESSURE: 72 MMHG | TEMPERATURE: 96.8 F | WEIGHT: 192 LBS | BODY MASS INDEX: 32.78 KG/M2 | HEART RATE: 59 BPM | SYSTOLIC BLOOD PRESSURE: 120 MMHG

## 2023-07-06 DIAGNOSIS — R11.10 ACUTE VOMITING: ICD-10-CM

## 2023-07-06 DIAGNOSIS — R07.0 PAIN IN THROAT: ICD-10-CM

## 2023-07-06 DIAGNOSIS — L81.4 HYPERPIGMENTATION OF ORAL CAVITY: Primary | ICD-10-CM

## 2023-07-06 PROCEDURE — 3074F SYST BP LT 130 MM HG: CPT | Performed by: NURSE PRACTITIONER

## 2023-07-06 PROCEDURE — 1123F ACP DISCUSS/DSCN MKR DOCD: CPT | Performed by: NURSE PRACTITIONER

## 2023-07-06 PROCEDURE — G8417 CALC BMI ABV UP PARAM F/U: HCPCS | Performed by: NURSE PRACTITIONER

## 2023-07-06 PROCEDURE — 1036F TOBACCO NON-USER: CPT | Performed by: NURSE PRACTITIONER

## 2023-07-06 PROCEDURE — G8427 DOCREV CUR MEDS BY ELIG CLIN: HCPCS | Performed by: NURSE PRACTITIONER

## 2023-07-06 PROCEDURE — 99213 OFFICE O/P EST LOW 20 MIN: CPT | Performed by: NURSE PRACTITIONER

## 2023-07-06 PROCEDURE — 3078F DIAST BP <80 MM HG: CPT | Performed by: NURSE PRACTITIONER

## 2023-07-06 ASSESSMENT — ENCOUNTER SYMPTOMS
TROUBLE SWALLOWING: 0
SHORTNESS OF BREATH: 0
SORE THROAT: 1
FACIAL SWELLING: 0
SINUS PRESSURE: 0
VOICE CHANGE: 0
COUGH: 0
RHINORRHEA: 0
CHEST TIGHTNESS: 0

## 2023-07-06 NOTE — PROGRESS NOTES
medical history in detail and updated the computerized patient record.       Fiorella Vincent, APRN - NP

## 2023-08-18 DIAGNOSIS — D64.9 ANEMIA, UNSPECIFIED TYPE: ICD-10-CM

## 2023-08-18 DIAGNOSIS — Z12.5 ENCOUNTER FOR SCREENING FOR MALIGNANT NEOPLASM OF PROSTATE: ICD-10-CM

## 2023-08-18 DIAGNOSIS — I10 ESSENTIAL HYPERTENSION: Chronic | ICD-10-CM

## 2023-08-18 DIAGNOSIS — N18.30 STAGE 3 CHRONIC KIDNEY DISEASE, UNSPECIFIED WHETHER STAGE 3A OR 3B CKD (HCC): ICD-10-CM

## 2023-08-18 DIAGNOSIS — Z85.46 H/O PROSTATE CANCER: Chronic | ICD-10-CM

## 2023-08-18 LAB
ALBUMIN SERPL-MCNC: 4.5 G/DL (ref 3.5–4.6)
ALP SERPL-CCNC: 83 U/L (ref 35–104)
ALT SERPL-CCNC: 18 U/L (ref 0–41)
ANION GAP SERPL CALCULATED.3IONS-SCNC: 9 MEQ/L (ref 9–15)
AST SERPL-CCNC: 21 U/L (ref 0–40)
BASOPHILS # BLD: 0 K/UL (ref 0–0.2)
BASOPHILS NFR BLD: 0.8 %
BILIRUB SERPL-MCNC: 1.4 MG/DL (ref 0.2–0.7)
BUN SERPL-MCNC: 18 MG/DL (ref 8–23)
CALCIUM SERPL-MCNC: 9.7 MG/DL (ref 8.5–9.9)
CHLORIDE SERPL-SCNC: 104 MEQ/L (ref 95–107)
CO2 SERPL-SCNC: 29 MEQ/L (ref 20–31)
CREAT SERPL-MCNC: 1.32 MG/DL (ref 0.7–1.2)
EOSINOPHIL # BLD: 0.1 K/UL (ref 0–0.7)
EOSINOPHIL NFR BLD: 2.2 %
ERYTHROCYTE [DISTWIDTH] IN BLOOD BY AUTOMATED COUNT: 14.2 % (ref 11.5–14.5)
GLOBULIN SER CALC-MCNC: 2.5 G/DL (ref 2.3–3.5)
GLUCOSE SERPL-MCNC: 101 MG/DL (ref 70–99)
HCT VFR BLD AUTO: 42.9 % (ref 42–52)
HGB BLD-MCNC: 14.5 G/DL (ref 14–18)
LYMPHOCYTES # BLD: 0.7 K/UL (ref 1–4.8)
LYMPHOCYTES NFR BLD: 19.5 %
MCH RBC QN AUTO: 30.5 PG (ref 27–31.3)
MCHC RBC AUTO-ENTMCNC: 33.9 % (ref 33–37)
MCV RBC AUTO: 90.1 FL (ref 79–92.2)
MONOCYTES # BLD: 0.4 K/UL (ref 0.2–0.8)
MONOCYTES NFR BLD: 11 %
NEUTROPHILS # BLD: 2.4 K/UL (ref 1.4–6.5)
NEUTS SEG NFR BLD: 66.5 %
PLATELET # BLD AUTO: 116 K/UL (ref 130–400)
POTASSIUM SERPL-SCNC: 4.8 MEQ/L (ref 3.4–4.9)
PROT SERPL-MCNC: 7 G/DL (ref 6.3–8)
PSA SERPL-MCNC: <0.01 NG/ML (ref 0–4)
RBC # BLD AUTO: 4.77 M/UL (ref 4.7–6.1)
SODIUM SERPL-SCNC: 142 MEQ/L (ref 135–144)
WBC # BLD AUTO: 3.5 K/UL (ref 4.8–10.8)

## 2023-08-19 SDOH — HEALTH STABILITY: PHYSICAL HEALTH: ON AVERAGE, HOW MANY DAYS PER WEEK DO YOU ENGAGE IN MODERATE TO STRENUOUS EXERCISE (LIKE A BRISK WALK)?: 7 DAYS

## 2023-08-22 ENCOUNTER — OFFICE VISIT (OUTPATIENT)
Dept: FAMILY MEDICINE CLINIC | Age: 78
End: 2023-08-22
Payer: MEDICARE

## 2023-08-22 VITALS
TEMPERATURE: 97.6 F | HEIGHT: 64 IN | DIASTOLIC BLOOD PRESSURE: 70 MMHG | WEIGHT: 200 LBS | SYSTOLIC BLOOD PRESSURE: 110 MMHG | BODY MASS INDEX: 34.15 KG/M2 | OXYGEN SATURATION: 97 % | HEART RATE: 56 BPM | RESPIRATION RATE: 16 BRPM

## 2023-08-22 DIAGNOSIS — Z87.891 PERSONAL HISTORY OF NICOTINE DEPENDENCE: ICD-10-CM

## 2023-08-22 DIAGNOSIS — R25.2 LEG CRAMPS: ICD-10-CM

## 2023-08-22 DIAGNOSIS — I10 ESSENTIAL HYPERTENSION: Chronic | ICD-10-CM

## 2023-08-22 DIAGNOSIS — T50.905A DRUG-INDUCED LOW PLATELET COUNT: ICD-10-CM

## 2023-08-22 DIAGNOSIS — E78.00 PURE HYPERCHOLESTEROLEMIA: ICD-10-CM

## 2023-08-22 DIAGNOSIS — I25.10 CORONARY ARTERY DISEASE INVOLVING NATIVE CORONARY ARTERY OF NATIVE HEART WITHOUT ANGINA PECTORIS: ICD-10-CM

## 2023-08-22 DIAGNOSIS — K22.719 BARRETT'S ESOPHAGUS WITH DYSPLASIA: Chronic | ICD-10-CM

## 2023-08-22 DIAGNOSIS — S03.40XA SPRAIN OF TEMPOROMANDIBULAR JOINT, INITIAL ENCOUNTER: Primary | ICD-10-CM

## 2023-08-22 DIAGNOSIS — Z12.5 PROSTATE CANCER SCREENING: ICD-10-CM

## 2023-08-22 DIAGNOSIS — C61 PROSTATE CANCER (HCC): ICD-10-CM

## 2023-08-22 DIAGNOSIS — N18.30 STAGE 3 CHRONIC KIDNEY DISEASE, UNSPECIFIED WHETHER STAGE 3A OR 3B CKD (HCC): ICD-10-CM

## 2023-08-22 DIAGNOSIS — G47.62 NOCTURNAL LEG CRAMPS: ICD-10-CM

## 2023-08-22 DIAGNOSIS — D69.59 DRUG-INDUCED LOW PLATELET COUNT: ICD-10-CM

## 2023-08-22 PROBLEM — R19.8 TENESMUS: Chronic | Status: RESOLVED | Noted: 2020-09-17 | Resolved: 2023-08-22

## 2023-08-22 PROBLEM — R19.5 CHANGE IN STOOL: Chronic | Status: RESOLVED | Noted: 2020-09-17 | Resolved: 2023-08-22

## 2023-08-22 PROBLEM — M79.671 PAIN OF RIGHT HEEL: Chronic | Status: RESOLVED | Noted: 2020-09-17 | Resolved: 2023-08-22

## 2023-08-22 PROBLEM — R07.9 CHEST PAIN: Status: RESOLVED | Noted: 2019-12-01 | Resolved: 2023-08-22

## 2023-08-22 PROBLEM — H61.23 BILATERAL IMPACTED CERUMEN: Status: RESOLVED | Noted: 2021-02-24 | Resolved: 2023-08-22

## 2023-08-22 PROCEDURE — 1036F TOBACCO NON-USER: CPT | Performed by: PHYSICIAN ASSISTANT

## 2023-08-22 PROCEDURE — 3074F SYST BP LT 130 MM HG: CPT | Performed by: PHYSICIAN ASSISTANT

## 2023-08-22 PROCEDURE — G8417 CALC BMI ABV UP PARAM F/U: HCPCS | Performed by: PHYSICIAN ASSISTANT

## 2023-08-22 PROCEDURE — G8427 DOCREV CUR MEDS BY ELIG CLIN: HCPCS | Performed by: PHYSICIAN ASSISTANT

## 2023-08-22 PROCEDURE — 99214 OFFICE O/P EST MOD 30 MIN: CPT | Performed by: PHYSICIAN ASSISTANT

## 2023-08-22 PROCEDURE — 1123F ACP DISCUSS/DSCN MKR DOCD: CPT | Performed by: PHYSICIAN ASSISTANT

## 2023-08-22 PROCEDURE — 3078F DIAST BP <80 MM HG: CPT | Performed by: PHYSICIAN ASSISTANT

## 2023-08-22 RX ORDER — ROSUVASTATIN CALCIUM 20 MG/1
20 TABLET, COATED ORAL NIGHTLY
Qty: 90 TABLET | Refills: 4 | Status: SHIPPED | OUTPATIENT
Start: 2023-08-22

## 2023-08-22 RX ORDER — ATENOLOL 50 MG/1
50 TABLET ORAL NIGHTLY
Qty: 90 TABLET | Refills: 4 | Status: SHIPPED | OUTPATIENT
Start: 2023-08-22

## 2023-08-22 RX ORDER — METHYLPREDNISOLONE 4 MG/1
TABLET ORAL
Qty: 1 KIT | Refills: 0 | Status: SHIPPED | OUTPATIENT
Start: 2023-08-22 | End: 2023-08-28

## 2023-08-22 RX ORDER — PANTOPRAZOLE SODIUM 40 MG/1
TABLET, DELAYED RELEASE ORAL
Qty: 90 TABLET | Refills: 4 | Status: SHIPPED | OUTPATIENT
Start: 2023-08-22

## 2023-08-22 ASSESSMENT — ENCOUNTER SYMPTOMS
COLOR CHANGE: 0
VOMITING: 0
NAUSEA: 0
PHOTOPHOBIA: 0
CHEST TIGHTNESS: 0
SHORTNESS OF BREATH: 0
FACIAL SWELLING: 0
DIARRHEA: 0
ABDOMINAL PAIN: 0
BLOOD IN STOOL: 0

## 2023-08-22 NOTE — PROGRESS NOTES
Skin:  Negative for color change, rash and wound. Neurological:  Positive for headaches. Negative for dizziness, tremors, seizures, syncope, facial asymmetry, speech difficulty, weakness, light-headedness and numbness. Hematological:  Does not bruise/bleed easily. Psychiatric/Behavioral:  Negative for agitation, dysphoric mood and sleep disturbance. The patient is nervous/anxious.       Past Medical History:   Diagnosis Date    Abnormal EKG 2023    Cancer ([address])     prostate    Hyperlipidemia     Hyperlipidemia 2020    Hypertension      Past Surgical History:   Procedure Laterality Date    CARDIAC CATHETERIZATION  2019    CATARACT REMOVAL WITH IMPLANT Bilateral 2013    COLONOSCOPY  2016    COLONOSCOPY N/A 4/15/2021    COLORECTAL CANCER SCREENING, HIGH RISK performed by Kory Bass MD at [address] [place] [address]  1998    CORONARY ANGIOPLASTY WITH STENT PLACEMENT  2007    CORONARY ANGIOPLASTY WITH STENT PLACEMENT  2009    CORONARY ANGIOPLASTY WITH STENT PLACEMENT  2012    CYSTOSCOPY  04/15/2021    HEMORRHOID SURGERY  2013    INGUINAL HERNIA REPAIR  1990    PROSTATE SURGERY  2003    removed    PROSTATE SURGERY  2013    biopsy    UPPER GASTROINTESTINAL ENDOSCOPY  2018    UPPER GASTROINTESTINAL ENDOSCOPY N/A 4/15/2021    EGD DIAGNOSTIC ONLY performed by Kory Bass MD at [address] [place] [address].  04/15/2021    VASECTOMY       Social History     Socioeconomic History    Marital status:      Spouse name: Not on file    Number of children: Not on file    Years of education: Not on file    Highest education level: Not on file   Occupational History    Not on file   Tobacco Use    Smoking status: Former     Packs/day: 1.00     Years: 51.00     Pack years: 51.00     Types: Cigarettes     Start date: 58     Quit date: 3/9/2011     Years since quittin.4    Smokeless tobacco: Never    Tobacco

## 2023-08-23 NOTE — RESULT ENCOUNTER NOTE
Please let patient know that his creatinine continues to be slightly elevated. It is important he follow-up with nephrology. His bilirubin is also elevated, if he is having abdominal symptoms it is important that he be seen. His platelets are slightly low, this should be rechecked in the next month or 2. It is important he follow-up with his PCP in the fall. His PSA is normal at less than 0. 1.

## 2023-10-05 ENCOUNTER — HOSPITAL ENCOUNTER (OUTPATIENT)
Dept: CT IMAGING | Age: 78
Discharge: HOME | End: 2023-10-07
Payer: MEDICARE

## 2023-10-05 DIAGNOSIS — Z87.891 PERSONAL HISTORY OF NICOTINE DEPENDENCE: ICD-10-CM

## 2023-10-05 PROCEDURE — 71271 CT THORAX LUNG CANCER SCR C-: CPT

## 2023-11-22 DIAGNOSIS — G47.62 NOCTURNAL LEG CRAMPS: ICD-10-CM

## 2023-11-22 DIAGNOSIS — R25.2 LEG CRAMPS: ICD-10-CM

## 2023-11-22 DIAGNOSIS — Z12.5 PROSTATE CANCER SCREENING: ICD-10-CM

## 2023-11-22 DIAGNOSIS — T50.905A DRUG-INDUCED LOW PLATELET COUNT: ICD-10-CM

## 2023-11-22 DIAGNOSIS — D69.59 DRUG-INDUCED LOW PLATELET COUNT: ICD-10-CM

## 2023-11-22 DIAGNOSIS — C61 PROSTATE CANCER (HCC): ICD-10-CM

## 2023-11-22 LAB
ANION GAP SERPL CALCULATED.3IONS-SCNC: 11 MEQ/L (ref 9–15)
BUN SERPL-MCNC: 19 MG/DL (ref 8–23)
CALCIUM SERPL-MCNC: 9.1 MG/DL (ref 8.5–9.9)
CHLORIDE SERPL-SCNC: 105 MEQ/L (ref 95–107)
CO2 SERPL-SCNC: 27 MEQ/L (ref 20–31)
CREAT SERPL-MCNC: 1.18 MG/DL (ref 0.7–1.2)
ERYTHROCYTE [DISTWIDTH] IN BLOOD BY AUTOMATED COUNT: 13.3 % (ref 11.5–14.5)
GLUCOSE SERPL-MCNC: 92 MG/DL (ref 70–99)
HCT VFR BLD AUTO: 41.6 % (ref 42–52)
HGB BLD-MCNC: 13.9 G/DL (ref 14–18)
MCH RBC QN AUTO: 30.1 PG (ref 27–31.3)
MCHC RBC AUTO-ENTMCNC: 33.4 % (ref 33–37)
MCV RBC AUTO: 90 FL (ref 79–92.2)
PLATELET # BLD AUTO: 103 K/UL (ref 130–400)
POTASSIUM SERPL-SCNC: 4.1 MEQ/L (ref 3.4–4.9)
PSA SERPL-MCNC: <0.01 NG/ML (ref 0–4)
RBC # BLD AUTO: 4.62 M/UL (ref 4.7–6.1)
SODIUM SERPL-SCNC: 143 MEQ/L (ref 135–144)
WBC # BLD AUTO: 3.5 K/UL (ref 4.8–10.8)

## 2023-11-22 NOTE — RESULT ENCOUNTER NOTE
Please notify pt that he has a mild anemia and his platelets are low.  It is important that he follow-up with a PCP very soon.

## 2023-11-23 LAB
FOLATE: 5.4 NG/ML (ref 4.8–24.2)
VITAMIN B-12: 449 PG/ML (ref 232–1245)

## 2023-11-30 ENCOUNTER — OFFICE VISIT (OUTPATIENT)
Dept: FAMILY MEDICINE CLINIC | Age: 78
End: 2023-11-30
Payer: MEDICARE

## 2023-11-30 VITALS
HEIGHT: 64 IN | WEIGHT: 203 LBS | RESPIRATION RATE: 18 BRPM | BODY MASS INDEX: 34.66 KG/M2 | SYSTOLIC BLOOD PRESSURE: 118 MMHG | DIASTOLIC BLOOD PRESSURE: 80 MMHG | HEART RATE: 52 BPM | OXYGEN SATURATION: 96 %

## 2023-11-30 DIAGNOSIS — R79.89 ABNORMAL CBC: ICD-10-CM

## 2023-11-30 DIAGNOSIS — I20.89 ANGINA OF EFFORT: ICD-10-CM

## 2023-11-30 DIAGNOSIS — I10 ESSENTIAL HYPERTENSION: Chronic | ICD-10-CM

## 2023-11-30 DIAGNOSIS — N18.30 STAGE 3 CHRONIC KIDNEY DISEASE, UNSPECIFIED WHETHER STAGE 3A OR 3B CKD (HCC): ICD-10-CM

## 2023-11-30 DIAGNOSIS — I25.119 ATHEROSCLEROSIS OF NATIVE CORONARY ARTERY OF NATIVE HEART WITH ANGINA PECTORIS (HCC): Primary | ICD-10-CM

## 2023-11-30 PROBLEM — S03.40XA SPRAIN OF TEMPOROMANDIBULAR JOINT OR LIGAMENT: Status: RESOLVED | Noted: 2023-08-22 | Resolved: 2023-11-30

## 2023-11-30 PROBLEM — C61 PROSTATE CANCER (HCC): Status: RESOLVED | Noted: 2020-09-17 | Resolved: 2023-11-30

## 2023-11-30 PROCEDURE — G8427 DOCREV CUR MEDS BY ELIG CLIN: HCPCS | Performed by: PHYSICIAN ASSISTANT

## 2023-11-30 PROCEDURE — 3079F DIAST BP 80-89 MM HG: CPT | Performed by: PHYSICIAN ASSISTANT

## 2023-11-30 PROCEDURE — 1036F TOBACCO NON-USER: CPT | Performed by: PHYSICIAN ASSISTANT

## 2023-11-30 PROCEDURE — 99214 OFFICE O/P EST MOD 30 MIN: CPT | Performed by: PHYSICIAN ASSISTANT

## 2023-11-30 PROCEDURE — G8484 FLU IMMUNIZE NO ADMIN: HCPCS | Performed by: PHYSICIAN ASSISTANT

## 2023-11-30 PROCEDURE — 3074F SYST BP LT 130 MM HG: CPT | Performed by: PHYSICIAN ASSISTANT

## 2023-11-30 PROCEDURE — G8417 CALC BMI ABV UP PARAM F/U: HCPCS | Performed by: PHYSICIAN ASSISTANT

## 2023-11-30 PROCEDURE — 1123F ACP DISCUSS/DSCN MKR DOCD: CPT | Performed by: PHYSICIAN ASSISTANT

## 2023-11-30 ASSESSMENT — ENCOUNTER SYMPTOMS
VOMITING: 0
BLOOD IN STOOL: 0
CHEST TIGHTNESS: 0
PHOTOPHOBIA: 0
SHORTNESS OF BREATH: 0
FACIAL SWELLING: 0
NAUSEA: 0
DIARRHEA: 0
COLOR CHANGE: 0
ABDOMINAL PAIN: 0

## 2023-11-30 NOTE — PROGRESS NOTES
Subjective  Mcneil Sandifer, 70 y.o. male presents today with:  Chief Complaint   Patient presents with    Follow-up     Discuss labs        HPI  Last OV with me: 8/22/23 as new patient. Wife, Holly Field, with patient. Overall, feeling well. Complaint with his medications. Had recent follow up with Dr. Ashley Garcia for CKD. Renal function remains stable. Has f/u with Dr. Solomon Estevez 1/9/24. Stable angina, has not needed SL nitro. Exercises daily    CT lung screening stable, category 1. BMs normal.     Results for orders placed or performed in visit on 11/22/23   Vitamin B12 & Folate   Result Value Ref Range    Vitamin B-12 449 232 - 1245 pg/mL    Folate 5.4 4.8 - 24.2 ng/mL   CBC   Result Value Ref Range    WBC 3.5 (L) 4.8 - 10.8 K/uL    RBC 4.62 (L) 4.70 - 6.10 M/uL    Hemoglobin 13.9 (L) 14.0 - 18.0 g/dL    Hematocrit 41.6 (L) 42.0 - 52.0 %    MCV 90.0 79.0 - 92.2 fL    MCH 30.1 27.0 - 31.3 pg    MCHC 33.4 33.0 - 37.0 %    RDW 13.3 11.5 - 14.5 %    Platelets 220 (L) 820 - 400 K/uL   Basic Metabolic Panel   Result Value Ref Range    Sodium 143 135 - 144 mEq/L    Potassium 4.1 3.4 - 4.9 mEq/L    Chloride 105 95 - 107 mEq/L    CO2 27 20 - 31 mEq/L    Anion Gap 11 9 - 15 mEq/L    Glucose 92 70 - 99 mg/dL    BUN 19 8 - 23 mg/dL    Creatinine 1.18 0.70 - 1.20 mg/dL    Est, Glom Filt Rate >60.0 >60    Calcium 9.1 8.5 - 9.9 mg/dL   PSA Screening   Result Value Ref Range    PSA <0.01 0.00 - 4.00 ng/mL     No results found for this visit on 11/30/23. Review of Systems   Constitutional:  Negative for activity change, appetite change, chills, fatigue, fever and unexpected weight change. HENT:  Negative for dental problem, facial swelling, hearing loss, nosebleeds and postnasal drip. Eyes:  Negative for photophobia. Respiratory:  Negative for chest tightness and shortness of breath. Cardiovascular:  Negative for chest pain, palpitations and leg swelling.    Gastrointestinal:  Negative for abdominal pain,

## 2023-12-28 DIAGNOSIS — I10 ESSENTIAL HYPERTENSION: Chronic | ICD-10-CM

## 2023-12-28 NOTE — TELEPHONE ENCOUNTER
Rx request   Requested Prescriptions     Pending Prescriptions Disp Refills    lisinopril (PRINIVIL;ZESTRIL) 10 MG tablet 90 tablet 3     Sig: Take 1 tablet by mouth daily     LOV 11/30/2023  Next Visit Date:  Future Appointments   Date Time Provider [address]   1/9/2024  8:00 AM Mary Ellen Ackerman DO [facility] [place]   6/6/2024  8:00 AM Aidan Garcia PA-C [id number] [facility]

## 2023-12-29 RX ORDER — LISINOPRIL 10 MG/1
10 TABLET ORAL DAILY
Qty: 90 TABLET | Refills: 3 | Status: SHIPPED | OUTPATIENT
Start: 2023-12-29

## 2024-01-02 DIAGNOSIS — R79.89 ABNORMAL CBC: ICD-10-CM

## 2024-01-02 LAB
ERYTHROCYTE [DISTWIDTH] IN BLOOD BY AUTOMATED COUNT: 13.2 % (ref 11.5–14.5)
HCT VFR BLD AUTO: 43.9 % (ref 42–52)
HGB BLD-MCNC: 15 G/DL (ref 14–18)
MCH RBC QN AUTO: 30.3 PG (ref 27–31.3)
MCHC RBC AUTO-ENTMCNC: 34.2 % (ref 33–37)
MCV RBC AUTO: 88.7 FL (ref 79–92.2)
PLATELET # BLD AUTO: 123 K/UL (ref 130–400)
RBC # BLD AUTO: 4.95 M/UL (ref 4.7–6.1)
WBC # BLD AUTO: 3.8 K/UL (ref 4.8–10.8)

## 2024-01-03 LAB
FERRITIN: 139 NG/ML (ref 30–400)
IRON % SATURATION: 37 % (ref 20–55)
IRON: 117 UG/DL (ref 59–158)
TOTAL IRON BINDING CAPACITY: 313 UG/DL (ref 250–450)
UNSATURATED IRON BINDING CAPACITY: 196 UG/DL (ref 112–347)

## 2024-01-09 ENCOUNTER — OFFICE VISIT (OUTPATIENT)
Dept: CARDIOLOGY CLINIC | Age: 79
End: 2024-01-09
Payer: MEDICARE

## 2024-01-09 VITALS
HEART RATE: 47 BPM | BODY MASS INDEX: 34.67 KG/M2 | WEIGHT: 202 LBS | DIASTOLIC BLOOD PRESSURE: 70 MMHG | SYSTOLIC BLOOD PRESSURE: 110 MMHG

## 2024-01-09 DIAGNOSIS — I73.9 CLAUDICATION (HCC): Primary | ICD-10-CM

## 2024-01-09 DIAGNOSIS — I25.10 CORONARY ARTERY DISEASE INVOLVING NATIVE CORONARY ARTERY OF NATIVE HEART WITHOUT ANGINA PECTORIS: ICD-10-CM

## 2024-01-09 DIAGNOSIS — E78.00 PURE HYPERCHOLESTEROLEMIA: ICD-10-CM

## 2024-01-09 DIAGNOSIS — Z87.891 H/O NICOTINE DEPENDENCE: ICD-10-CM

## 2024-01-09 DIAGNOSIS — I25.10 ATHEROSCLEROTIC CARDIOVASCULAR DISEASE: ICD-10-CM

## 2024-01-09 DIAGNOSIS — I25.119 ATHEROSCLEROSIS OF NATIVE CORONARY ARTERY OF NATIVE HEART WITH ANGINA PECTORIS (HCC): ICD-10-CM

## 2024-01-09 DIAGNOSIS — I10 ESSENTIAL HYPERTENSION: ICD-10-CM

## 2024-01-09 PROCEDURE — 99214 OFFICE O/P EST MOD 30 MIN: CPT | Performed by: INTERNAL MEDICINE

## 2024-01-09 PROCEDURE — 1036F TOBACCO NON-USER: CPT | Performed by: INTERNAL MEDICINE

## 2024-01-09 PROCEDURE — G8427 DOCREV CUR MEDS BY ELIG CLIN: HCPCS | Performed by: INTERNAL MEDICINE

## 2024-01-09 PROCEDURE — G8484 FLU IMMUNIZE NO ADMIN: HCPCS | Performed by: INTERNAL MEDICINE

## 2024-01-09 PROCEDURE — 93000 ELECTROCARDIOGRAM COMPLETE: CPT | Performed by: INTERNAL MEDICINE

## 2024-01-09 PROCEDURE — G8417 CALC BMI ABV UP PARAM F/U: HCPCS | Performed by: INTERNAL MEDICINE

## 2024-01-09 PROCEDURE — 3078F DIAST BP <80 MM HG: CPT | Performed by: INTERNAL MEDICINE

## 2024-01-09 PROCEDURE — 1123F ACP DISCUSS/DSCN MKR DOCD: CPT | Performed by: INTERNAL MEDICINE

## 2024-01-09 PROCEDURE — 3074F SYST BP LT 130 MM HG: CPT | Performed by: INTERNAL MEDICINE

## 2024-01-09 RX ORDER — ISOSORBIDE MONONITRATE 60 MG/1
60 TABLET, EXTENDED RELEASE ORAL DAILY
COMMUNITY

## 2024-01-09 NOTE — PROGRESS NOTES
Chief Complaint   Patient presents with    Coronary Artery Disease    Hypertension    Hyperlipidemia       4-12-22: Patient presents for initial medical evaluation. Patient is followed on a regular basis by Dr. Garcia, Jennifer BAÑUELOS, SUSANA. Hx of CAD s/p 8 stents, 7 to RCA and one CX,   Hypertension, hyperlipidemia, Shaw's esophagus, who presents for general cardiology follow-up.  Previous [facility] patient.  Patient underwent cardiac catheterization 2019 with no PCI performed at that time.  Pt denies chest pain, dyspnea, dyspnea on exertion, change in exercise capacity, fatigue,  nausea, vomiting, diarrhea, constipation, motor weakness, insomnia, weight loss, syncope, dizziness, lightheadedness, palpitations, PND, orthopnea. + cramps at night.   Patient with history of recurrent prostate cancer, undergoing Radiation tx. LDL is 68  EKG with SB at 50bpm, non specific changes.       4/18/2023: Patient is doing well overall.  Does have history of coronary disease status post multivessel PCI with 7 stents in the right coronary and 1 to the circumflex.  Last cardiac catheterization in 2019 and no PCI was performed then.    Patient did have 1 episode of chest pain 6 months ago where he visited the ER and was discharged home.  Patient does also complain of shortness of breath with exertion.  Work-up was negative at that time.  BP home log is ok.  Lipid profile is normal with an LDL of 53.  EKG with normal sinus rhythm, T wave inversions in inferior leads.  Which appears to be new as compared to previous.    6/13/23: pt seen in office today to discuss test results.  Pt had echo on 6/7/23 which showed:   Summary   Normal tricuspid valve structure and function.   1+ TR   RVSP 55 mmHg   Normal left ventricle structure and function.   Left ventricular ejection fraction is visually estimated at 55-60%.   Pseudonormal filling pattern noted.     Pt had stress test on 6/7/23 which showed:  Images reveal large predominantly fixed

## 2024-01-10 ENCOUNTER — PATIENT MESSAGE (OUTPATIENT)
Dept: CARDIOLOGY CLINIC | Age: 79
End: 2024-01-10

## 2024-01-11 NOTE — TELEPHONE ENCOUNTER
From: Tulio Wilson  To: Dr. Howard Ackerman  Sent: 1/10/2024 4:28 PM EST  Subject: Vascular duplex lower extremity    [facility] says order was incorrectly coded, so I'm unable to schedule the procedure.

## 2024-01-15 ENCOUNTER — TELEPHONE (OUTPATIENT)
Dept: CARDIOLOGY CLINIC | Age: 79
End: 2024-01-15

## 2024-01-15 NOTE — TELEPHONE ENCOUNTER
Pt calling because the vas duplex lower extremity and vas duplex carotid is still coded wrong according to [facility]. He just called [facility] today on 01/15/24 and they told him this.     Please correct and call pt once test are verified that they can be [facility].    Pt # [phone]

## 2024-01-16 ENCOUNTER — TELEPHONE (OUTPATIENT)
Dept: CARDIOLOGY CLINIC | Age: 79
End: 2024-01-16

## 2024-01-16 NOTE — TELEPHONE ENCOUNTER
PATIENT DOES NOT HAVE A COVERABLE DIAGNOSIS CODE LISTED IN CHART  TO SCHEDULE THE CAROTID ULTRASOUND.    PLEASE ADVISE

## 2024-01-16 NOTE — TELEPHONE ENCOUNTER
PATIENT AWARE LE U/S READY TO BE SCHEDULED. HAVE TO SEND MESSAGE TO DR ORTIZ REGARDING THE CAROTID US  NO COVERABLE DIAGNOSIS IN CHART

## 2024-01-19 NOTE — TELEPHONE ENCOUNTER
Please try another code such as atherosclerotic cardio disease, carotid artery disease, carotid artery stenosis, etc.

## 2024-01-30 ENCOUNTER — HOSPITAL ENCOUNTER (OUTPATIENT)
Dept: ULTRASOUND IMAGING | Age: 79
Discharge: HOME | End: 2024-02-01
Payer: MEDICARE

## 2024-01-30 DIAGNOSIS — I25.10 ATHEROSCLEROTIC CARDIOVASCULAR DISEASE: ICD-10-CM

## 2024-01-30 DIAGNOSIS — R09.89 CORONARY ARTERY DISEASE WITH CARDIAC SYMPTOMS: ICD-10-CM

## 2024-01-30 DIAGNOSIS — I25.10 CORONARY ARTERY DISEASE WITH CARDIAC SYMPTOMS: ICD-10-CM

## 2024-01-30 DIAGNOSIS — I73.9 CLAUDICATION (HCC): ICD-10-CM

## 2024-01-30 LAB
VAS LEFT ATA DIST PSV: 44.3 CM/S
VAS LEFT ATA MID PSV: 85.1 CM/S
VAS LEFT ATA PROX PSV: 83.4 CM/S
VAS LEFT CCA DIST EDV: 12.3 CM/S
VAS LEFT CCA DIST PSV: 55.3 CM/S
VAS LEFT CCA MID EDV: 13.7 CM/S
VAS LEFT CCA MID PSV: 74.6 CM/S
VAS LEFT CCA PROX EDV: 15.4 CM/S
VAS LEFT CCA PROX PSV: 71.9 CM/S
VAS LEFT CFA PROX PSV: 137 CM/S
VAS LEFT ECA EDV: 9.41 CM/S
VAS LEFT ECA PSV: 139 CM/S
VAS LEFT ICA DIST EDV: 15.3 CM/S
VAS LEFT ICA DIST PSV: 52.9 CM/S
VAS LEFT ICA MID EDV: 18.7 CM/S
VAS LEFT ICA MID PSV: 62.6 CM/S
VAS LEFT ICA PROX EDV: 20.3 CM/S
VAS LEFT ICA PROX PSV: 71.3 CM/S
VAS LEFT PERONEAL DIST PSV: 29.8 CM/S
VAS LEFT PERONEAL MID PSV: 38.3 CM/S
VAS LEFT PERONEAL PROX PSV: 33.6 CM/S
VAS LEFT PFA PROX PSV: 52.1 CM/S
VAS LEFT POP A DIST PSV: 51.6 CM/S
VAS LEFT POP A PROX PSV: 70.8 CM/S
VAS LEFT POP A PROX VEL RATIO: 1.11
VAS LEFT PTA DIST PSV: 58.5 CM/S
VAS LEFT PTA MID PSV: 67.8 CM/S
VAS LEFT PTA PROX PSV: 61.4 CM/S
VAS LEFT SFA DIST PSV: 63.7 CM/S
VAS LEFT SFA DIST VEL RATIO: 0.71
VAS LEFT SFA MID PSV: 89.7 CM/S
VAS LEFT SFA MID VEL RATIO: 0.93
VAS LEFT SFA PROX PSV: 96.7 CM/S
VAS LEFT SFA PROX VEL RATIO: 0.71
VAS LEFT VERTEBRAL EDV: 6.28 CM/S
VAS LEFT VERTEBRAL PSV: 30.4 CM/S
VAS RIGHT ATA DIST PSV: 72.4 CM/S
VAS RIGHT ATA MID PSV: 62 CM/S
VAS RIGHT ATA PROX PSV: 53.2 CM/S
VAS RIGHT CCA DIST EDV: 12.8 CM/S
VAS RIGHT CCA DIST PSV: 72.7 CM/S
VAS RIGHT CCA MID EDV: 14.3 CM/S
VAS RIGHT CCA MID PSV: 67.3 CM/S
VAS RIGHT CCA PROX EDV: 11.3 CM/S
VAS RIGHT CCA PROX PSV: 64.9 CM/S
VAS RIGHT CFA PROX PSV: 112 CM/S
VAS RIGHT ECA EDV: 6.27 CM/S
VAS RIGHT ECA PSV: 118 CM/S
VAS RIGHT ICA DIST EDV: 14.1 CM/S
VAS RIGHT ICA DIST PSV: 49.4 CM/S
VAS RIGHT ICA MID EDV: 14.5 CM/S
VAS RIGHT ICA MID PSV: 58 CM/S
VAS RIGHT ICA PROX EDV: 19.9 CM/S
VAS RIGHT ICA PROX PSV: 85.1 CM/S
VAS RIGHT PERONEAL DIST PSV: 16.9 CM/S
VAS RIGHT PERONEAL MID PSV: 21.1 CM/S
VAS RIGHT PERONEAL PROX PSV: 25 CM/S
VAS RIGHT PFA PROX PSV: 93.8 CM/S
VAS RIGHT POP A DIST PSV: 52.7 CM/S
VAS RIGHT POP A PROX PSV: 84.5 CM/S
VAS RIGHT POP A PROX VEL RATIO: 1.19
VAS RIGHT PTA DIST PSV: 55.4 CM/S
VAS RIGHT PTA MID PSV: 103 CM/S
VAS RIGHT PTA PROX PSV: 71.3 CM/S
VAS RIGHT SFA DIST PSV: 70.8 CM/S
VAS RIGHT SFA DIST VEL RATIO: 0.8
VAS RIGHT SFA MID PSV: 88.3 CM/S
VAS RIGHT SFA MID VEL RATIO: 0.9
VAS RIGHT SFA PROX PSV: 96 CM/S
VAS RIGHT SFA PROX VEL RATIO: 0.9
VAS RIGHT VERTEBRAL EDV: 6.79 CM/S
VAS RIGHT VERTEBRAL PSV: 24.6 CM/S

## 2024-01-30 PROCEDURE — 93880 EXTRACRANIAL BILAT STUDY: CPT

## 2024-01-30 PROCEDURE — 93925 LOWER EXTREMITY STUDY: CPT

## 2024-02-05 ENCOUNTER — TELEPHONE (OUTPATIENT)
Dept: CARDIOLOGY CLINIC | Age: 79
End: 2024-02-05

## 2024-02-05 NOTE — TELEPHONE ENCOUNTER
Both tests checked out okay  No need for any further intervention at this time  If patient has any significant claudication symptoms he is to notify us

## 2024-05-03 ENCOUNTER — OFFICE VISIT (OUTPATIENT)
Dept: GASTROENTEROLOGY | Age: 79
End: 2024-05-03
Payer: MEDICARE

## 2024-05-03 ENCOUNTER — TELEPHONE (OUTPATIENT)
Dept: FAMILY MEDICINE CLINIC | Age: 79
End: 2024-05-03

## 2024-05-03 ENCOUNTER — PREP FOR PROCEDURE (OUTPATIENT)
Dept: GASTROENTEROLOGY | Age: 79
End: 2024-05-03

## 2024-05-03 VITALS
OXYGEN SATURATION: 98 % | SYSTOLIC BLOOD PRESSURE: 112 MMHG | HEART RATE: 83 BPM | WEIGHT: 204 LBS | BODY MASS INDEX: 35.02 KG/M2 | DIASTOLIC BLOOD PRESSURE: 76 MMHG

## 2024-05-03 DIAGNOSIS — Z12.5 PROSTATE CANCER SCREENING: Primary | ICD-10-CM

## 2024-05-03 DIAGNOSIS — K92.1 MELENA: Primary | ICD-10-CM

## 2024-05-03 DIAGNOSIS — K92.1 MELENA: ICD-10-CM

## 2024-05-03 DIAGNOSIS — R19.8 CHANGE IN BOWEL MOVEMENT: ICD-10-CM

## 2024-05-03 DIAGNOSIS — K22.70 BARRETT'S ESOPHAGUS WITHOUT DYSPLASIA: ICD-10-CM

## 2024-05-03 DIAGNOSIS — K22.719 BARRETT'S ESOPHAGUS WITH DYSPLASIA: Chronic | ICD-10-CM

## 2024-05-03 DIAGNOSIS — K21.9 GASTROESOPHAGEAL REFLUX DISEASE, UNSPECIFIED WHETHER ESOPHAGITIS PRESENT: ICD-10-CM

## 2024-05-03 PROCEDURE — 3078F DIAST BP <80 MM HG: CPT | Performed by: NURSE PRACTITIONER

## 2024-05-03 PROCEDURE — 1036F TOBACCO NON-USER: CPT | Performed by: NURSE PRACTITIONER

## 2024-05-03 PROCEDURE — G8417 CALC BMI ABV UP PARAM F/U: HCPCS | Performed by: NURSE PRACTITIONER

## 2024-05-03 PROCEDURE — 3074F SYST BP LT 130 MM HG: CPT | Performed by: NURSE PRACTITIONER

## 2024-05-03 PROCEDURE — G8427 DOCREV CUR MEDS BY ELIG CLIN: HCPCS | Performed by: NURSE PRACTITIONER

## 2024-05-03 PROCEDURE — 99213 OFFICE O/P EST LOW 20 MIN: CPT | Performed by: NURSE PRACTITIONER

## 2024-05-03 PROCEDURE — 1123F ACP DISCUSS/DSCN MKR DOCD: CPT | Performed by: NURSE PRACTITIONER

## 2024-05-03 RX ORDER — FAMOTIDINE 20 MG/1
20 TABLET, FILM COATED ORAL NIGHTLY
Qty: 90 TABLET | Refills: 1 | Status: SHIPPED | OUTPATIENT
Start: 2024-05-03

## 2024-05-03 ASSESSMENT — ENCOUNTER SYMPTOMS
DIARRHEA: 1
BLOOD IN STOOL: 1

## 2024-05-03 NOTE — PROGRESS NOTES
Gastroenterology Clinic Follow up Visit    Tulio Wilson  [id number]  Chief Complaint   Patient presents with    Follow-up    Constipation    Diarrhea       HPI: 79 y.o. male following up after last GI clinic on Visit date not found     Interval change: Patient presents to GI clinic with black stools for the last 3 to 4 weeks.  Patient has longstanding history of reflux and Shaw's esophagus C0 M1 on Prag criteria.  Patient has been taking Protonix daily with breakthrough symptoms of acid reflux occurring mostly at night.  Patient denies any abdominal pain however does report abdominal bloating on occasion.  Patient reports softer stools thinner in caliber, and feeling of incomplete bowel movement since decreasing Metamucil to 2-3 times a week.  Patient reports loose bowel movements daily 3-5 times a day.  Patient denies any unintentional weight loss, dysphagia, hematochezia.  Patient has history of hemorrhoid surgery and 2013 and anal dilation.    Aspirin 81 mg daily    HPI from last GI clinic visit on Visit 4/6/2021 summarized below:  76 y.o. male presents to the clinic to establish care with new gastroenterologist.  Patient with history of Shaw's esophagus in the past and history of colon polyps.  Patient reports previous colonoscopy in April 2016 and last upper endoscopy in 2018.  Longstanding reflux, on Protonix with good control of symptoms, very intermittent nonconcerning dysphagia, denies any hematemesis, weight loss or loss of appetite  Reports 3-4 loose bowel movements a day denies any blood in them.  Takes 1 teaspoon of Metamucil daily.  Reports history of hemorrhoid surgery in 2013, required anal dilation following surgery.  History of cardiac stents, reports last stent placement 2012, underwent angioplasty in 2018  No family history of colon cancer     Previous GI work up/Endoscopic investigations:   EGD and colonoscopy 4/15/2021, Nevaeh Gilman  Shaw's appearing mucosa in the form of one small

## 2024-05-03 NOTE — TELEPHONE ENCOUNTER
Patient is requesting a lab order for PSA.    Patient states he has some other labs to do and would like to get this done before his next appointment with you.    # [phone]

## 2024-05-06 RX ORDER — SODIUM CHLORIDE 0.9 % (FLUSH) 0.9 %
5-40 SYRINGE (ML) INJECTION EVERY 12 HOURS SCHEDULED
Status: CANCELLED | OUTPATIENT
Start: 2024-05-06

## 2024-05-06 RX ORDER — SODIUM CHLORIDE 0.9 % (FLUSH) 0.9 %
5-40 SYRINGE (ML) INJECTION PRN
Status: CANCELLED | OUTPATIENT
Start: 2024-05-06

## 2024-05-06 RX ORDER — SODIUM CHLORIDE 9 MG/ML
INJECTION, SOLUTION INTRAVENOUS CONTINUOUS
Status: CANCELLED | OUTPATIENT
Start: 2024-05-06

## 2024-05-06 RX ORDER — SODIUM CHLORIDE 9 MG/ML
INJECTION, SOLUTION INTRAVENOUS PRN
Status: CANCELLED | OUTPATIENT
Start: 2024-05-06

## 2024-05-13 DIAGNOSIS — Z12.5 PROSTATE CANCER SCREENING: ICD-10-CM

## 2024-05-13 DIAGNOSIS — K92.1 MELENA: ICD-10-CM

## 2024-05-13 LAB
ERYTHROCYTE [DISTWIDTH] IN BLOOD BY AUTOMATED COUNT: 13.5 % (ref 11.5–14.5)
HCT VFR BLD AUTO: 43.7 % (ref 42–52)
HGB BLD-MCNC: 14.9 G/DL (ref 14–18)
MCH RBC QN AUTO: 30.7 PG (ref 27–31.3)
MCHC RBC AUTO-ENTMCNC: 34.1 % (ref 33–37)
MCV RBC AUTO: 89.9 FL (ref 79–92.2)
PLATELET # BLD AUTO: 135 K/UL (ref 130–400)
PSA SERPL-MCNC: <0.01 NG/ML (ref 0–4)
RBC # BLD AUTO: 4.86 M/UL (ref 4.7–6.1)
WBC # BLD AUTO: 3.6 K/UL (ref 4.8–10.8)

## 2024-05-14 NOTE — RESULT ENCOUNTER NOTE
Results review.  Notify patient normal. No further action at this time.      Please keep follow up appointment after EGD procedure complete.

## 2024-06-04 SDOH — ECONOMIC STABILITY: FOOD INSECURITY: WITHIN THE PAST 12 MONTHS, YOU WORRIED THAT YOUR FOOD WOULD RUN OUT BEFORE YOU GOT MONEY TO BUY MORE.: NEVER TRUE

## 2024-06-04 SDOH — ECONOMIC STABILITY: FOOD INSECURITY: WITHIN THE PAST 12 MONTHS, THE FOOD YOU BOUGHT JUST DIDN'T LAST AND YOU DIDN'T HAVE MONEY TO GET MORE.: NEVER TRUE

## 2024-06-04 SDOH — ECONOMIC STABILITY: INCOME INSECURITY: HOW HARD IS IT FOR YOU TO PAY FOR THE VERY BASICS LIKE FOOD, HOUSING, MEDICAL CARE, AND HEATING?: NOT HARD AT ALL

## 2024-06-04 ASSESSMENT — PATIENT HEALTH QUESTIONNAIRE - PHQ9
SUM OF ALL RESPONSES TO PHQ QUESTIONS 1-9: 12
SUM OF ALL RESPONSES TO PHQ QUESTIONS 1-9: 12
9. THOUGHTS THAT YOU WOULD BE BETTER OFF DEAD, OR OF HURTING YOURSELF: SEVERAL DAYS
3. TROUBLE FALLING OR STAYING ASLEEP: NEARLY EVERY DAY
4. FEELING TIRED OR HAVING LITTLE ENERGY: MORE THAN HALF THE DAYS
5. POOR APPETITE OR OVEREATING: SEVERAL DAYS
7. TROUBLE CONCENTRATING ON THINGS, SUCH AS READING THE NEWSPAPER OR WATCHING TELEVISION: NOT AT ALL
6. FEELING BAD ABOUT YOURSELF - OR THAT YOU ARE A FAILURE OR HAVE LET YOURSELF OR YOUR FAMILY DOWN: MORE THAN HALF THE DAYS
10. IF YOU CHECKED OFF ANY PROBLEMS, HOW DIFFICULT HAVE THESE PROBLEMS MADE IT FOR YOU TO DO YOUR WORK, TAKE CARE OF THINGS AT HOME, OR GET ALONG WITH OTHER PEOPLE: NOT DIFFICULT AT ALL
SUM OF ALL RESPONSES TO PHQ QUESTIONS 1-9: 11
SUM OF ALL RESPONSES TO PHQ9 QUESTIONS 1 & 2: 3
8. MOVING OR SPEAKING SO SLOWLY THAT OTHER PEOPLE COULD HAVE NOTICED. OR THE OPPOSITE - BEING SO FIDGETY OR RESTLESS THAT YOU HAVE BEEN MOVING AROUND A LOT MORE THAN USUAL: NOT AT ALL
SUM OF ALL RESPONSES TO PHQ QUESTIONS 1-9: 12
9. THOUGHTS THAT YOU WOULD BE BETTER OFF DEAD, OR OF HURTING YOURSELF: SEVERAL DAYS
SUM OF ALL RESPONSES TO PHQ QUESTIONS 1-9: 12
1. LITTLE INTEREST OR PLEASURE IN DOING THINGS: SEVERAL DAYS
3. TROUBLE FALLING OR STAYING ASLEEP: NEARLY EVERY DAY
6. FEELING BAD ABOUT YOURSELF - OR THAT YOU ARE A FAILURE OR HAVE LET YOURSELF OR YOUR FAMILY DOWN: MORE THAN HALF THE DAYS
1. LITTLE INTEREST OR PLEASURE IN DOING THINGS: SEVERAL DAYS
2. FEELING DOWN, DEPRESSED OR HOPELESS: MORE THAN HALF THE DAYS
7. TROUBLE CONCENTRATING ON THINGS, SUCH AS READING THE NEWSPAPER OR WATCHING TELEVISION: NOT AT ALL
8. MOVING OR SPEAKING SO SLOWLY THAT OTHER PEOPLE COULD HAVE NOTICED. OR THE OPPOSITE, BEING SO FIGETY OR RESTLESS THAT YOU HAVE BEEN MOVING AROUND A LOT MORE THAN USUAL: NOT AT ALL
2. FEELING DOWN, DEPRESSED OR HOPELESS: MORE THAN HALF THE DAYS
10. IF YOU CHECKED OFF ANY PROBLEMS, HOW DIFFICULT HAVE THESE PROBLEMS MADE IT FOR YOU TO DO YOUR WORK, TAKE CARE OF THINGS AT HOME, OR GET ALONG WITH OTHER PEOPLE: NOT DIFFICULT AT ALL
4. FEELING TIRED OR HAVING LITTLE ENERGY: MORE THAN HALF THE DAYS
5. POOR APPETITE OR OVEREATING: SEVERAL DAYS

## 2024-06-04 ASSESSMENT — COLUMBIA-SUICIDE SEVERITY RATING SCALE - C-SSRS
1. IN THE PAST MONTH, HAVE YOU WISHED YOU WERE DEAD OR WISHED YOU COULD GO TO SLEEP AND NOT WAKE UP?: YES
6. IN YOUR LIFETIME, HAVE YOU EVER DONE ANYTHING, STARTED TO DO ANYTHING, OR PREPARED TO DO ANYTHING TO END YOUR LIFE?: NO
2. IN THE PAST MONTH, HAVE YOU ACTUALLY HAD ANY THOUGHTS OF KILLING YOURSELF?: NO

## 2024-06-06 ENCOUNTER — OFFICE VISIT (OUTPATIENT)
Dept: FAMILY MEDICINE CLINIC | Age: 79
End: 2024-06-06

## 2024-06-06 VITALS
BODY MASS INDEX: 35 KG/M2 | HEART RATE: 72 BPM | HEIGHT: 64 IN | OXYGEN SATURATION: 96 % | SYSTOLIC BLOOD PRESSURE: 120 MMHG | OXYGEN SATURATION: 96 % | RESPIRATION RATE: 16 BRPM | DIASTOLIC BLOOD PRESSURE: 70 MMHG | SYSTOLIC BLOOD PRESSURE: 120 MMHG | HEART RATE: 72 BPM | DIASTOLIC BLOOD PRESSURE: 70 MMHG | WEIGHT: 205 LBS | BODY MASS INDEX: 35.03 KG/M2 | HEIGHT: 64 IN | WEIGHT: 205.2 LBS | RESPIRATION RATE: 16 BRPM

## 2024-06-06 DIAGNOSIS — G89.29 CHRONIC RIGHT HIP PAIN: Chronic | ICD-10-CM

## 2024-06-06 DIAGNOSIS — E66.01 SEVERE OBESITY (BMI 35.0-39.9) WITH COMORBIDITY (HCC): ICD-10-CM

## 2024-06-06 DIAGNOSIS — M25.562 CHRONIC PAIN OF LEFT KNEE: ICD-10-CM

## 2024-06-06 DIAGNOSIS — M54.50 CHRONIC RIGHT-SIDED LOW BACK PAIN, UNSPECIFIED WHETHER SCIATICA PRESENT: ICD-10-CM

## 2024-06-06 DIAGNOSIS — G89.29 CHRONIC RIGHT-SIDED LOW BACK PAIN, UNSPECIFIED WHETHER SCIATICA PRESENT: ICD-10-CM

## 2024-06-06 DIAGNOSIS — M25.551 CHRONIC RIGHT HIP PAIN: Chronic | ICD-10-CM

## 2024-06-06 DIAGNOSIS — Z00.00 MEDICARE ANNUAL WELLNESS VISIT, SUBSEQUENT: Primary | ICD-10-CM

## 2024-06-06 DIAGNOSIS — I25.119 ATHEROSCLEROSIS OF NATIVE CORONARY ARTERY OF NATIVE HEART WITH ANGINA PECTORIS (HCC): ICD-10-CM

## 2024-06-06 DIAGNOSIS — N18.30 STAGE 3 CHRONIC KIDNEY DISEASE, UNSPECIFIED WHETHER STAGE 3A OR 3B CKD (HCC): Primary | ICD-10-CM

## 2024-06-06 DIAGNOSIS — G89.29 CHRONIC PAIN OF LEFT KNEE: ICD-10-CM

## 2024-06-06 DIAGNOSIS — I10 ESSENTIAL HYPERTENSION: Chronic | ICD-10-CM

## 2024-06-06 RX ORDER — ISOSORBIDE MONONITRATE 60 MG/1
60 TABLET, EXTENDED RELEASE ORAL DAILY
Qty: 90 TABLET | Refills: 2 | Status: SHIPPED | OUTPATIENT
Start: 2024-06-06

## 2024-06-06 ASSESSMENT — ENCOUNTER SYMPTOMS
DIARRHEA: 0
PHOTOPHOBIA: 0
COLOR CHANGE: 0
BLOOD IN STOOL: 0
ABDOMINAL PAIN: 0
VOMITING: 0
NAUSEA: 0
FACIAL SWELLING: 0
SHORTNESS OF BREATH: 0
BACK PAIN: 1
CHEST TIGHTNESS: 0

## 2024-06-06 ASSESSMENT — LIFESTYLE VARIABLES
HOW MANY STANDARD DRINKS CONTAINING ALCOHOL DO YOU HAVE ON A TYPICAL DAY: 1 OR 2
HOW OFTEN DO YOU HAVE A DRINK CONTAINING ALCOHOL: 2-3 TIMES A WEEK

## 2024-06-06 NOTE — PROGRESS NOTES
Subjective  Tulio Wilson, 79 y.o. male presents today with:  Chief Complaint   Patient presents with    6 Month Follow-Up    Lower Back Pain     States he has been having low back pain when standing long periods, and worsened with activity     Knee Pain     Left knee pain that is worse at night when laying down       HPI  Last OV with me: 11/30/23.     History of allen's esophagus.  Has EGD with Dr. Dawson tomorrow.  Remains on pepcid and PPI.  Tolerating.      +MIKE with agoraphobia.  Recently lost a close friend, going through stages of grief.  Known history of depression, PTSD (related to [place]).  Has noticed increased symptoms recently.  Watching movies related to war have seemed to have triggered something.  Does not want any medication.   Possibly interested in pursuing va services.      Increased L knee pain.  Worse at night.  Activity during the day is OK.  Just at night he will having discomfort.  Aching pain.     C/o worsening R sided low back pain.  History of R hip pain/arthritis.  He has been doing more yardwork, which seems to make pain worse.    Trying to exercise, walks on treadmill.    Takes little OTC medication.   Would possibly be interested in PT.  Open to xrays.      Followed by cardiology for HTN/CAD.  Has upcoming follow up.     CKD stage 3--followed by Dr. Henriquez.    Results for orders placed or performed in visit on 05/13/24   CBC   Result Value Ref Range    WBC 3.6 (L) 4.8 - 10.8 K/uL    RBC 4.86 4.70 - 6.10 M/uL    Hemoglobin 14.9 14.0 - 18.0 g/dL    Hematocrit 43.7 42.0 - 52.0 %    MCV 89.9 79.0 - 92.2 fL    MCH 30.7 27.0 - 31.3 pg    MCHC 34.1 33.0 - 37.0 %    RDW 13.5 11.5 - 14.5 %    Platelets 135 130 - 400 K/uL     No results found for this visit on 06/06/24.    Review of Systems   Constitutional:  Negative for activity change, appetite change, chills, fatigue, fever and unexpected weight change.   HENT:  Negative for dental problem, facial swelling, hearing loss, nosebleeds

## 2024-06-06 NOTE — PROGRESS NOTES
Medicare Annual Wellness Visit    Tulio Wilson is here for Medicare AWV    Assessment & Plan   Medicare annual wellness visit, subsequent  Recommendations for Preventive Services Due: see orders and patient instructions/AVS.  Recommended screening schedule for the next 5-10 years is provided to the patient in written form: see Patient Instructions/AVS.     No follow-ups on file.     Subjective       Patient's complete Health Risk Assessment and screening values have been reviewed and are found in Flowsheets. The following problems were reviewed today and where indicated follow up appointments were made and/or referrals ordered.    Positive Risk Factor Screenings with Interventions:    Fall Risk:  Do you feel unsteady or are you worried about falling? : no  2 or more falls in past year?: (!) yes  Fall with injury in past year?: no     Interventions:    Reviewed medications, home hazards, visual acuity, and co-morbidities that can increase risk for falls  Patient declines any further evaluation or treatment     Depression:       Interpretation:  5-9 mild   10-14 moderate   15-19 moderately severe   20-27 severe     Interventions:  Will discuss further today.           General HRA Questions:  Select all that apply: (!) New or Increased Pain    Pain Interventions:  L knee pain, will discuss further      Activity, Diet, and Weight:  On average, how many days per week do you engage in moderate to strenuous exercise (like a brisk walk)?: 0 days  On average, how many minutes do you engage in exercise at this level?: 0 min    Do you eat balanced/healthy meals regularly?: Yes    Body mass index is 35.19 kg/m². (!) Abnormal      Inactivity Interventions:  Patient declined any further interventions or treatment  Obesity Interventions:  Patient declines any further evaluation or treatment                               Objective   Vitals:    06/06/24 0827   BP: 120/70   Site: Right Upper Arm   Position: Sitting   Cuff Size:

## 2024-06-06 NOTE — PATIENT INSTRUCTIONS
for glaucoma; cataracts, macular degeneration, and other eye disorders.  A preventive dental visit is recommended every 6 months.  Try to get at least 150 minutes of exercise per week or 10,000 steps per day on a pedometer .  Order or download the FREE \"Exercise & Physical Activity: Your Everyday Guide\" from [employer]. Call 1-382.321.6376 or search [employer] online.  You need 2184-7710 mg of calcium and 5536-6883 IU of vitamin D per day. It is possible to meet your calcium requirement with diet alone, but a vitamin D supplement is usually necessary to meet this goal.  When exposed to the sun, use a sunscreen that protects against both UVA and UVB radiation with an SPF of 30 or greater. Reapply every 2 to 3 hours or after sweating, drying off with a towel, or swimming.  Always wear a seat belt when traveling in a car. Always wear a helmet when riding a bicycle or motorcycle.

## 2024-06-07 ENCOUNTER — ANESTHESIA (OUTPATIENT)
Dept: ENDOSCOPY | Age: 79
End: 2024-06-07
Payer: MEDICARE

## 2024-06-07 ENCOUNTER — HOSPITAL ENCOUNTER (OUTPATIENT)
Age: 79
Setting detail: OUTPATIENT SURGERY
Discharge: HOME | End: 2024-06-07
Attending: INTERNAL MEDICINE | Admitting: INTERNAL MEDICINE
Payer: MEDICARE

## 2024-06-07 ENCOUNTER — ANESTHESIA EVENT (OUTPATIENT)
Dept: ENDOSCOPY | Age: 79
End: 2024-06-07
Payer: MEDICARE

## 2024-06-07 VITALS
RESPIRATION RATE: 18 BRPM | SYSTOLIC BLOOD PRESSURE: 131 MMHG | TEMPERATURE: 98.2 F | HEIGHT: 64 IN | WEIGHT: 195 LBS | OXYGEN SATURATION: 94 % | HEART RATE: 53 BPM | DIASTOLIC BLOOD PRESSURE: 76 MMHG | BODY MASS INDEX: 33.29 KG/M2

## 2024-06-07 DIAGNOSIS — K21.9 GERD (GASTROESOPHAGEAL REFLUX DISEASE): ICD-10-CM

## 2024-06-07 DIAGNOSIS — K92.1 MELENA: ICD-10-CM

## 2024-06-07 DIAGNOSIS — R19.8 CHANGE IN BOWEL MOVEMENT: ICD-10-CM

## 2024-06-07 DIAGNOSIS — K22.70 BARRETT'S ESOPHAGUS WITHOUT DYSPLASIA: ICD-10-CM

## 2024-06-07 PROCEDURE — 2580000003 HC RX 258

## 2024-06-07 PROCEDURE — 2580000003 HC RX 258: Performed by: INTERNAL MEDICINE

## 2024-06-07 PROCEDURE — 3700000001 HC ADD 15 MINUTES (ANESTHESIA): Performed by: INTERNAL MEDICINE

## 2024-06-07 PROCEDURE — 3700000000 HC ANESTHESIA ATTENDED CARE: Performed by: INTERNAL MEDICINE

## 2024-06-07 PROCEDURE — 6360000002 HC RX W HCPCS: Performed by: NURSE ANESTHETIST, CERTIFIED REGISTERED

## 2024-06-07 PROCEDURE — 7100000011 HC PHASE II RECOVERY - ADDTL 15 MIN: Performed by: INTERNAL MEDICINE

## 2024-06-07 PROCEDURE — 43239 EGD BIOPSY SINGLE/MULTIPLE: CPT | Performed by: INTERNAL MEDICINE

## 2024-06-07 PROCEDURE — 7100000010 HC PHASE II RECOVERY - FIRST 15 MIN: Performed by: INTERNAL MEDICINE

## 2024-06-07 PROCEDURE — 2709999900 HC NON-CHARGEABLE SUPPLY: Performed by: INTERNAL MEDICINE

## 2024-06-07 PROCEDURE — 2500000003 HC RX 250 WO HCPCS: Performed by: NURSE ANESTHETIST, CERTIFIED REGISTERED

## 2024-06-07 PROCEDURE — 3609017100 HC EGD: Performed by: INTERNAL MEDICINE

## 2024-06-07 PROCEDURE — 88342 IMHCHEM/IMCYTCHM 1ST ANTB: CPT

## 2024-06-07 PROCEDURE — 88305 TISSUE EXAM BY PATHOLOGIST: CPT

## 2024-06-07 RX ORDER — SODIUM CHLORIDE 9 MG/ML
INJECTION, SOLUTION INTRAVENOUS PRN
Status: DISCONTINUED | OUTPATIENT
Start: 2024-06-07 | End: 2024-06-07 | Stop reason: HOSPADM

## 2024-06-07 RX ORDER — SODIUM CHLORIDE 0.9 % (FLUSH) 0.9 %
5-40 SYRINGE (ML) INJECTION EVERY 12 HOURS SCHEDULED
Status: DISCONTINUED | OUTPATIENT
Start: 2024-06-07 | End: 2024-06-07 | Stop reason: HOSPADM

## 2024-06-07 RX ORDER — SODIUM CHLORIDE 0.9 % (FLUSH) 0.9 %
5-40 SYRINGE (ML) INJECTION PRN
Status: DISCONTINUED | OUTPATIENT
Start: 2024-06-07 | End: 2024-06-07 | Stop reason: HOSPADM

## 2024-06-07 RX ORDER — SODIUM CHLORIDE 9 MG/ML
INJECTION, SOLUTION INTRAVENOUS
Status: COMPLETED
Start: 2024-06-07 | End: 2024-06-07

## 2024-06-07 RX ORDER — PROPOFOL 10 MG/ML
INJECTION, EMULSION INTRAVENOUS PRN
Status: DISCONTINUED | OUTPATIENT
Start: 2024-06-07 | End: 2024-06-07 | Stop reason: SDUPTHER

## 2024-06-07 RX ORDER — LIDOCAINE HYDROCHLORIDE 20 MG/ML
INJECTION, SOLUTION INFILTRATION; PERINEURAL PRN
Status: DISCONTINUED | OUTPATIENT
Start: 2024-06-07 | End: 2024-06-07 | Stop reason: SDUPTHER

## 2024-06-07 RX ORDER — SODIUM CHLORIDE 9 MG/ML
INJECTION, SOLUTION INTRAVENOUS CONTINUOUS
Status: DISCONTINUED | OUTPATIENT
Start: 2024-06-07 | End: 2024-06-07 | Stop reason: HOSPADM

## 2024-06-07 RX ADMIN — SODIUM CHLORIDE: 9 INJECTION, SOLUTION INTRAVENOUS at 06:59

## 2024-06-07 RX ADMIN — PROPOFOL 100 MG: 10 INJECTION, EMULSION INTRAVENOUS at 07:45

## 2024-06-07 RX ADMIN — LIDOCAINE HYDROCHLORIDE 100 MG: 20 INJECTION, SOLUTION INFILTRATION; PERINEURAL at 07:44

## 2024-06-07 ASSESSMENT — PAIN - FUNCTIONAL ASSESSMENT
PAIN_FUNCTIONAL_ASSESSMENT: NONE - DENIES PAIN
PAIN_FUNCTIONAL_ASSESSMENT: 0-10
PAIN_FUNCTIONAL_ASSESSMENT: NONE - DENIES PAIN

## 2024-06-07 ASSESSMENT — PAIN DESCRIPTION - DESCRIPTORS: DESCRIPTORS: ACHING;DISCOMFORT

## 2024-06-07 NOTE — ANESTHESIA POSTPROCEDURE EVALUATION
Department of Anesthesiology  Postprocedure Note    Patient: Tulio Wilson  MRN: [id number]  YOB: 1945  Date of evaluation: 6/7/2024    Procedure Summary       Date: 06/07/24 Room / [facility] OR 02 / [facility]    Anesthesia Start: 0741 Anesthesia Stop: 0756    Procedure: ESOPHAGOGASTRODUODENOSCOPY WITH BIOPSIES Diagnosis:       Melena      Shaw's esophagus without dysplasia      GERD (gastroesophageal reflux disease)      Change in bowel movement      (Melena [K92.1])      (Shaw's esophagus without dysplasia [K22.70])      (GERD (gastroesophageal reflux disease) [K21.9])      (Change in bowel movement [R19.8])    Surgeons: Dao Dawson MD Responsible Provider: Hong Davila APRN - CRNA    Anesthesia Type: MAC ASA Status: 3            Anesthesia Type: No value filed.    Orquidea Phase I: Orquidea Score: 10    Orquidea Phase II:      Anesthesia Post Evaluation    Patient location during evaluation: bedside  Patient participation: complete - patient participated  Level of consciousness: awake and awake and alert  Airway patency: patent  Nausea & Vomiting: no nausea and no vomiting  Cardiovascular status: blood pressure returned to baseline and hemodynamically stable  Respiratory status: acceptable  Hydration status: euvolemic  Pain management: adequate        No notable events documented.

## 2024-06-07 NOTE — ANESTHESIA POSTPROCEDURE EVALUATION
Department of Anesthesiology  Postprocedure Note    Patient: Tulio Wilson  MRN: [id number]  YOB: 1945  Date of evaluation: 6/7/2024    Procedure Summary       Date: 06/07/24 Room / Location: [facility] OR 02 / [facility]    Anesthesia Start: 0741 Anesthesia Stop: 0756    Procedure: ESOPHAGOGASTRODUODENOSCOPY WITH BIOPSIES Diagnosis:       Melena      Shaw's esophagus without dysplasia      GERD (gastroesophageal reflux disease)      Change in bowel movement      (Melena [K92.1])      (Shaw's esophagus without dysplasia [K22.70])      (GERD (gastroesophageal reflux disease) [K21.9])      (Change in bowel movement [R19.8])    Surgeons: Dao Dawson MD Responsible Provider: Hong Davila APRN - CRNA    Anesthesia Type: MAC ASA Status: 3            Anesthesia Type: No value filed.    Orquidea Phase I: Orquidea Score: 10    Orquidea Phase II:      Anesthesia Post Evaluation    Patient location during evaluation: bedside  Patient participation: complete - patient participated  Level of consciousness: awake and awake and alert  Airway patency: patent  Nausea & Vomiting: no nausea and no vomiting  Cardiovascular status: blood pressure returned to baseline and hemodynamically stable  Respiratory status: acceptable  Hydration status: euvolemic  Pain management: adequate        No notable events documented.

## 2024-06-07 NOTE — ANESTHESIA PRE PROCEDURE
Department of Anesthesiology  Preprocedure Note       Name:  Tulio Wilson   Age:  79 y.o.  :  1945                                          MRN:  [id number]         Date:  2024      Surgeon: Surgeon(s):  Dao Dawson MD    Procedure: Procedure(s):  ESOPHAGOGASTRODUODENOSCOPY    Medications prior to admission:   Prior to Admission medications    Medication Sig Start Date End Date Taking? Authorizing Provider   FOLIC ACID PO Take by mouth  Patient not taking: Reported on 2024    Anne Mora MD   isosorbide mononitrate (IMDUR) 60 MG extended release tablet Take 1 tablet by mouth daily 24   Jennifer Garcia PA-C   famotidine (PEPCID) 20 MG tablet Take 1 tablet by mouth nightly 5/3/24   Slavik-Bosworth, Kelly J, DULCE - CNP   lisinopril (PRINIVIL;ZESTRIL) 10 MG tablet Take 1 tablet by mouth daily 23   Jennifer Garcia PA-C   rosuvastatin (CRESTOR) 20 MG tablet Take 1 tablet by mouth nightly 23   Jennifer Garcia PA-C   pantoprazole (PROTONIX) 40 MG tablet TAKE 1 TABLET DAILY 23   Jennifer Garcia PA-C   atenolol (TENORMIN) 50 MG tablet Take 1 tablet by mouth nightly 23   Jennifer Garcia PA-C   psyllium (METAMUCIL) 28 % packet Take 1 packet by mouth 2 times daily Take with full glass of h20 or juice    Anne Mora MD   Magnesium 400 MG CAPS Take 1 capsule by mouth daily  Patient taking differently: Take 1 capsule by mouth nightly 10/7/19   Sun Helms MD   nitroGLYCERIN (NITROSTAT) 0.4 MG SL tablet Place 1 tablet under the tongue every 5 minutes as needed for Chest pain up to max of 3 total doses. If no relief after 1 dose, call 911.    Anne Mora MD   aspirin 81 MG tablet Take 1 tablet by mouth daily    Anne Mora MD   Cholecalciferol (VITAMIN D3) 1000 units TABS Take by mouth daily     Anne Mora MD       Current medications:    Current Facility-Administered Medications   Medication Dose Route Frequency Provider Last

## 2024-06-07 NOTE — H&P
Patient Name: Tulio Wilson  : 1945  MRN: [id number]  DATE: 24      ENDOSCOPY  History and Physical    Procedure:    [] Diagnostic Colonoscopy       [] Screening Colonoscopy  [x] EGD      [] ERCP      [] EUS       [] Other    [x] Previous office notes/History and Physical reviewed from the patients chart. Please see EMR for further details of HPI. I have examined the patient's status immediately prior to the procedure and:      Indications/HPI:    []Abdominal Pain   []Cancer- GI/Lung  []Fhx of colon CA  []History of Polyps   []Shaw’s   [x]Melena  []Abnormal Imaging   []Dysphagia    []Persistent Pneumonia  []Anemia   []Food Impaction  []History of Polyps  []GI Bleed   []Pulmonary nodule/Mass  []Change in bowel habits  []Heartburn/Reflux  []Rectal Bleed (BRBPR)  []Chest Pain - Non Cardiac  []Heme (+) Stool  []Ulcers  []Constipation   []Hemoptysis   []Varices  []Diarrhea   []Hypoxemia  []Nausea/Vomiting   []Screening   []Crohns/Colitis  [x]Other: h/o Shaw's    Anesthesia:   [x] MAC [] Moderate Sedation   [] General   [] None     ROS: 12 pt Review of Symptoms was negative unless mentioned above    Medications:   Prior to Admission medications    Medication Sig Start Date End Date Taking? Authorizing Provider   FOLIC ACID PO Take by mouth  Patient not taking: Reported on 2024    Provider, MD Anne   isosorbide mononitrate (IMDUR) 60 MG extended release tablet Take 1 tablet by mouth daily 24   Jennifer Garcia PA-C   famotidine (PEPCID) 20 MG tablet Take 1 tablet by mouth nightly 5/3/24   Slavik-Bosworth, Kelly J, APRN - CNP   lisinopril (PRINIVIL;ZESTRIL) 10 MG tablet Take 1 tablet by mouth daily 23   Jennifer Garcia PA-C   rosuvastatin (CRESTOR) 20 MG tablet Take 1 tablet by mouth nightly 23   Jennifer Garcia PA-C   pantoprazole (PROTONIX) 40 MG tablet TAKE 1 TABLET DAILY 23   Jennifer Garcia PA-C   atenolol (TENORMIN) 50 MG tablet Take 1 tablet by mouth nightly 23

## 2024-06-17 DIAGNOSIS — I25.119 ATHEROSCLEROSIS OF NATIVE CORONARY ARTERY OF NATIVE HEART WITH ANGINA PECTORIS (HCC): ICD-10-CM

## 2024-06-17 DIAGNOSIS — N18.30 STAGE 3 CHRONIC KIDNEY DISEASE, UNSPECIFIED WHETHER STAGE 3A OR 3B CKD (HCC): ICD-10-CM

## 2024-06-17 LAB
ALBUMIN SERPL-MCNC: 4.1 G/DL (ref 3.5–4.6)
ALP SERPL-CCNC: 77 U/L (ref 35–104)
ALT SERPL-CCNC: 17 U/L (ref 0–41)
ANION GAP SERPL CALCULATED.3IONS-SCNC: 9 MEQ/L (ref 9–15)
AST SERPL-CCNC: 20 U/L (ref 0–40)
BILIRUB SERPL-MCNC: 1.1 MG/DL (ref 0.2–0.7)
BUN SERPL-MCNC: 17 MG/DL (ref 8–23)
CALCIUM SERPL-MCNC: 9.2 MG/DL (ref 8.5–9.9)
CHLORIDE SERPL-SCNC: 105 MEQ/L (ref 95–107)
CHOLEST SERPL-MCNC: 108 MG/DL (ref 0–199)
CO2 SERPL-SCNC: 27 MEQ/L (ref 20–31)
CREAT SERPL-MCNC: 1.27 MG/DL (ref 0.7–1.2)
GLOBULIN SER CALC-MCNC: 2.3 G/DL (ref 2.3–3.5)
GLUCOSE SERPL-MCNC: 101 MG/DL (ref 70–99)
HDLC SERPL-MCNC: 49 MG/DL (ref 40–59)
LDLC SERPL CALC-MCNC: 43 MG/DL (ref 0–129)
POTASSIUM SERPL-SCNC: 4.8 MEQ/L (ref 3.4–4.9)
PROT SERPL-MCNC: 6.4 G/DL (ref 6.3–8)
SODIUM SERPL-SCNC: 141 MEQ/L (ref 135–144)
TRIGL SERPL-MCNC: 80 MG/DL (ref 0–150)

## 2024-06-19 NOTE — PROGRESS NOTES
increase fluid, fiber intake and physical activity.    -Continue fiber (ex. Metamucil 3.4 g by mouth daily with a glass of water or juice) titrate up to 2-3 times per day as needed.    Return in about 1 year (around 6/20/2025). Or sooner if symptoms return.     Kelly J Slavik-Bosworth, DULCE - CNP   Staff Gastroenterology Nurse Practitioner  [facility]    Please note this report has been partially produced using speech recognition software and may cause/contain errors related to that system including grammar, punctuation and spelling as well as words andphrases that may seem inappropriate. If there are questions or concerns please feel free to contact me to clarify.

## 2024-06-20 ENCOUNTER — OFFICE VISIT (OUTPATIENT)
Dept: GASTROENTEROLOGY | Age: 79
End: 2024-06-20
Payer: MEDICARE

## 2024-06-20 VITALS — HEIGHT: 64 IN | HEART RATE: 65 BPM | WEIGHT: 202 LBS | OXYGEN SATURATION: 98 % | BODY MASS INDEX: 34.49 KG/M2

## 2024-06-20 DIAGNOSIS — K21.9 GASTROESOPHAGEAL REFLUX DISEASE WITHOUT ESOPHAGITIS: ICD-10-CM

## 2024-06-20 DIAGNOSIS — R19.8 CHANGE IN BOWEL MOVEMENT: ICD-10-CM

## 2024-06-20 DIAGNOSIS — Z87.19 HISTORY OF BARRETT'S ESOPHAGUS: Primary | ICD-10-CM

## 2024-06-20 PROCEDURE — 1036F TOBACCO NON-USER: CPT | Performed by: NURSE PRACTITIONER

## 2024-06-20 PROCEDURE — G8427 DOCREV CUR MEDS BY ELIG CLIN: HCPCS | Performed by: NURSE PRACTITIONER

## 2024-06-20 PROCEDURE — 99213 OFFICE O/P EST LOW 20 MIN: CPT | Performed by: NURSE PRACTITIONER

## 2024-06-20 PROCEDURE — 1123F ACP DISCUSS/DSCN MKR DOCD: CPT | Performed by: NURSE PRACTITIONER

## 2024-06-20 PROCEDURE — G8417 CALC BMI ABV UP PARAM F/U: HCPCS | Performed by: NURSE PRACTITIONER

## 2024-06-30 DIAGNOSIS — M85.80 OSTEOPENIA, UNSPECIFIED LOCATION: ICD-10-CM

## 2024-06-30 DIAGNOSIS — Z09 ENCOUNTER FOR FOLLOW-UP EXAMINATION AFTER COMPLETED TREATMENT FOR CONDITIONS OTHER THAN MALIGNANT NEOPLASM: ICD-10-CM

## 2024-06-30 DIAGNOSIS — G89.29 CHRONIC PAIN OF LEFT KNEE: ICD-10-CM

## 2024-06-30 DIAGNOSIS — M25.562 CHRONIC PAIN OF LEFT KNEE: ICD-10-CM

## 2024-06-30 DIAGNOSIS — M54.50 CHRONIC RIGHT-SIDED LOW BACK PAIN, UNSPECIFIED WHETHER SCIATICA PRESENT: Primary | ICD-10-CM

## 2024-06-30 DIAGNOSIS — G89.29 CHRONIC RIGHT-SIDED LOW BACK PAIN, UNSPECIFIED WHETHER SCIATICA PRESENT: Primary | ICD-10-CM

## 2024-07-08 ENCOUNTER — HOSPITAL ENCOUNTER (OUTPATIENT)
Dept: WOMENS IMAGING | Age: 79
Discharge: HOME | End: 2024-07-10
Payer: MEDICARE

## 2024-07-08 DIAGNOSIS — Z09 ENCOUNTER FOR FOLLOW-UP EXAMINATION AFTER COMPLETED TREATMENT FOR CONDITIONS OTHER THAN MALIGNANT NEOPLASM: ICD-10-CM

## 2024-07-08 DIAGNOSIS — M85.80 OSTEOPENIA, UNSPECIFIED LOCATION: ICD-10-CM

## 2024-07-08 PROCEDURE — 77080 DXA BONE DENSITY AXIAL: CPT

## 2024-07-11 DIAGNOSIS — M81.0 AGE-RELATED OSTEOPOROSIS WITHOUT CURRENT PATHOLOGICAL FRACTURE: Primary | ICD-10-CM

## 2024-07-11 RX ORDER — ALENDRONATE SODIUM 70 MG/1
70 TABLET ORAL
Qty: 12 TABLET | Refills: 2 | Status: SHIPPED | OUTPATIENT
Start: 2024-07-11

## 2024-07-17 ENCOUNTER — HOSPITAL ENCOUNTER (OUTPATIENT)
Dept: PHYSICAL THERAPY | Age: 79
Setting detail: THERAPIES SERIES
Discharge: HOME | End: 2024-07-17
Payer: MEDICARE

## 2024-07-17 PROCEDURE — 97110 THERAPEUTIC EXERCISES: CPT

## 2024-07-17 PROCEDURE — 97162 PT EVAL MOD COMPLEX 30 MIN: CPT

## 2024-07-17 NOTE — PLAN OF CARE
Physical Therapy Evaluation/Plan of Care   [facility] CALEB YANG Washta - PT  [address]  [address]  [place] [address]  Dept: [phone]  Dept Fax: [phone]  Loc: [phone]    Physical Therapy: Initial Evaluation    General Information    Patient: Tulio Wilson (79 y.o.     male)   Examination Date: 2024   :  1945 ;    Confirmed: Yes MRN: [id number]  CSN: [id number]   Insurance: Payor: MEDICARE / Plan: MEDICARE PART A AND B / Product Type: *No Product type* /   Insurance ID: [id number] - (Medicare)    Secondary Insurance (if applicable): [facility]    Referring Physician: Jennifer Garcia PA-C       Visits to Date/Visits Approved:   (BMN,medicare guidelines)    No Show/Cancelled Appts: 0 / 0     Medical Diagnosis: Chronic right-sided low back pain, unspecified whether sciatica present [M54.50, G89.29]  Chronic pain of left knee [M25.562, G89.29]        Treatment Diagnosis: LBP with decreased ROM, strength affecting overall functional tolerance.      SUBJECTIVE:     Onset date: exacerbation of symptoms after gardening May 2024. Bending increasing symptoms.        Subjective/ Mechanism of Injury:   Subjective: Pt reports chronic pain for 1-2 years with knee arthritic pain starting first. Pt has been retired for 20 years. No interventions to date per patient and decided to talk to MD about symptoms. Pain is constant in low back (moderate )and knee pain (mild).   Worse with sit to stand after prolonged sitting. Pain starts on R LB and migrates to L.    Changes in Bowel/Bladder Function: yes occasional  Saddle Anesthesia: no  Unexplained Weight Loss: no  Unrelenting Night Pain: no  Sudden Weakness/Falls: yes couple of falls     Interventions for current problem: None     Pain:   Current: 2/10   Best: 0/10   Worst:6/10 (occurs with gardening, bending, standing).     Symptom Type / Quality: dull, aching  Location:: Left, Right, Lower Back: Without radicular symptoms

## 2024-07-19 ENCOUNTER — HOSPITAL ENCOUNTER (OUTPATIENT)
Dept: PHYSICAL THERAPY | Age: 79
Setting detail: THERAPIES SERIES
Discharge: HOME | End: 2024-07-19
Payer: MEDICARE

## 2024-07-19 PROCEDURE — 97110 THERAPEUTIC EXERCISES: CPT

## 2024-07-19 ASSESSMENT — PAIN SCALES - GENERAL: PAINLEVEL_OUTOF10: 1

## 2024-07-19 ASSESSMENT — PAIN DESCRIPTION - LOCATION: LOCATION: BACK

## 2024-07-19 ASSESSMENT — PAIN DESCRIPTION - DESCRIPTORS: DESCRIPTORS: DULL

## 2024-07-19 ASSESSMENT — PAIN DESCRIPTION - ORIENTATION: ORIENTATION: LOWER

## 2024-07-19 NOTE — PROGRESS NOTES
strength 4/5 to 4+/5 to  allow patient to improve walking distance >10-15 min In progress     Plan:  Frequency/Duration:  Plan  Plan Frequency: 2  Plan weeks: 4-6  Current Treatment Recommendations: Strengthening, ROM, Balance training, Functional mobility training, Manual, Group Therapy, Modalities  Modalities: Heat/Cold, Ultrasound, E-stim - unattended  Pt to continue current HEP.  See objective section for any therapeutic exercise changes, additions or modifications this date.    Therapy Time:  PT Individual Minutes  Time In: 0920  Time Out: 1000  Minutes: 40  Timed Code Treatment Minutes: 40 Minutes  Timed Activity Minutes Units   Ther Ex 40 3     Electronically signed by Mayra Rondon PTA on 7/19/24 at 10:13 AM EDT

## 2024-07-22 ENCOUNTER — OFFICE VISIT (OUTPATIENT)
Dept: ORTHOPEDIC SURGERY | Age: 79
End: 2024-07-22
Payer: MEDICARE

## 2024-07-22 VITALS
WEIGHT: 202 LBS | BODY MASS INDEX: 34.49 KG/M2 | OXYGEN SATURATION: 98 % | TEMPERATURE: 97.2 F | HEIGHT: 64 IN | HEART RATE: 54 BPM

## 2024-07-22 DIAGNOSIS — M81.0 AGE RELATED OSTEOPOROSIS, UNSPECIFIED PATHOLOGICAL FRACTURE PRESENCE: Primary | ICD-10-CM

## 2024-07-22 DIAGNOSIS — M25.562 ACUTE PAIN OF LEFT KNEE: ICD-10-CM

## 2024-07-22 DIAGNOSIS — M51.36 DEGENERATIVE DISC DISEASE, LUMBAR: ICD-10-CM

## 2024-07-22 PROBLEM — M51.369 DEGENERATIVE DISC DISEASE, LUMBAR: Status: ACTIVE | Noted: 2024-07-22

## 2024-07-22 PROCEDURE — G8427 DOCREV CUR MEDS BY ELIG CLIN: HCPCS | Performed by: ORTHOPAEDIC SURGERY

## 2024-07-22 PROCEDURE — G8417 CALC BMI ABV UP PARAM F/U: HCPCS | Performed by: ORTHOPAEDIC SURGERY

## 2024-07-22 PROCEDURE — 1036F TOBACCO NON-USER: CPT | Performed by: ORTHOPAEDIC SURGERY

## 2024-07-22 PROCEDURE — 99214 OFFICE O/P EST MOD 30 MIN: CPT | Performed by: ORTHOPAEDIC SURGERY

## 2024-07-22 PROCEDURE — 1123F ACP DISCUSS/DSCN MKR DOCD: CPT | Performed by: ORTHOPAEDIC SURGERY

## 2024-07-22 RX ORDER — METHYLPREDNISOLONE 4 MG/1
TABLET ORAL
Qty: 21 TABLET | Refills: 0 | Status: SHIPPED | OUTPATIENT
Start: 2024-07-22

## 2024-07-22 NOTE — PROGRESS NOTES
Subjective:      Patient ID: Tulio Wilson is a 79 y.o. male who presents today for:  Chief Complaint   Patient presents with    New Patient     Symptoms: right sided back dull pain and left knee   Onset: 1 year  PT?: yes   Injections?: no  Other treatments: Bone scan   Prior Surgery?: no  Diabetic?: no  Smoker?: no  # of Alcoholic Drinks/Day: no  Taking blood thinners?: aspirin   Referred by: Jennifer Garcia       Subjective/Objective/Assessment/Plan:     SUBJECTIVE -patient comes in with back pain.  States that is been going on for many years.  Does not really talk about any type of radicular complaint into his legs.  He just began physical therapy.    OBJECTIVE - The patient can rise up on their toes and rise up on her heels.  5 out of 5 hip flexion and knee extension strength bilaterally.  Sensation intact bilaterally in the lower extremities from L2-S1.      DEXA BONE DENSITY AXIAL SKELETON  Narrative: EXAMINATION:  BONE DENSITOMETRY    7/8/2024 9:18 am    TECHNIQUE:  A bone density dual x-ray absorptiometry (DXA) scan was performed of the  lumbar spine and bilateral hips on a lunar advanced prodigy DEXA system.    COMPARISON:  None.    HISTORY:  ORDERING SYSTEM PROVIDED HISTORY: Osteopenia, unspecified location, Encounter  for follow-up examination after completed treatment for conditions other than  malignant neoplasm    Gender: M    Age: 80 y/o    FINDINGS:  LUMBAR SPINE: L1-L4    BMD: 1.243 g/cm2    T-score: 0.1    Z-score: 0.4    LEFT TOTAL HIP:    BMD: 0.915 g/cm2    T-score: -1.3    Z-score: -0.5    LEFT FEMORAL NECK:  BMD: 0.739 g/cm2  T-score: -2.5    Z-score: -1.3    FRAX 10-YEAR PROBABILITY OF FRACTURE:    Major osteoporotic fracture: 9.9%    Hip fracture: 4.1%  Impression: Osteoporosis by WHO criteria.    RECOMMENDATIONS:  1. All patients should optimize their calcium and vitamin D intake.    2. Consider FDA-approved medical therapies in postmenopausal women and men  aged 50 years and older, based on

## 2024-07-23 ENCOUNTER — HOSPITAL ENCOUNTER (OUTPATIENT)
Dept: PHYSICAL THERAPY | Age: 79
Setting detail: THERAPIES SERIES
Discharge: HOME | End: 2024-07-23
Payer: MEDICARE

## 2024-07-23 PROCEDURE — 97110 THERAPEUTIC EXERCISES: CPT

## 2024-07-23 NOTE — PROGRESS NOTES
[address]   [place] [address]  Phone:[phone]      Physical TherapyTreatment Note        Date: 2024  Patient: Tulio Wilson  : 1945   Confirmed: Yes  MRN: [id number]  Referring Provider: Jennifer Garcia PA-C  Medical Diagnosis: Chronic right-sided low back pain, unspecified whether sciatica present [M54.50, G89.29]  Chronic pain of left knee [M25.562, G89.29]   Treatment Diagnosis: LBP with decreased ROM, strength affecting overall functional tolerance.    Visit Information:  Insurance: Payor: MEDICARE / Plan: MEDICARE PART A AND B / Product Type: *No Product type* /   PT Visit Information  Total # of Visits Approved:  (BMN,medicare guidelines)  Total # of Visits to Date: 3  Plan of Care/Certification Expiration Date: 24  No Show: 0  Progress Note Due Date: 24  Canceled Appointment: 0  Progress Note Counter: 3/8 (PN due 24)    Subjective Information:  Subjective: Pt has been primarily doing the hamstring and piriformis stretches at home 2-3 times a day but hasn't really been doing the other exercises much yet. Able to do piriformis stretch leaned back in his recliner then able to cross his leg over and push down. He just has a constant dull pain across lower back. It isn't in his buttocks anymore. Saw Dr. Hopson and was put on steroids, then f/u in 2 months. If symptoms fail to improve or worsen then will order MRI per MD note.  HEP Compliance:  [] Good [x] Fair  [] Poor [] Reports not doing due to:    Pain Screening  Patient Currently in Pain: Yes  Pain Assessment: 0-10  Pain Level: 1  Pain Location: Back  Pain Orientation: Lower, Right, Left  Pain Descriptors: Dull    Treatment:  Exercises:  Exercises  Exercise 1: seated piriformis 30s x 3 B , seated hamstretch 30s x 3 B  Exercise 2: hip add with ball 15 x 5\" / abd with BTB - too easy/discontinued  Exercise 4: supine TA iso 10 x 5\" , TA march 10 x 3\" , TA SLR 10 x 3\"  Exercise 5: side lying hip abd 10 x

## 2024-07-26 ENCOUNTER — HOSPITAL ENCOUNTER (OUTPATIENT)
Dept: PHYSICAL THERAPY | Age: 79
Setting detail: THERAPIES SERIES
Discharge: HOME | End: 2024-07-26
Payer: MEDICARE

## 2024-07-26 PROCEDURE — 97110 THERAPEUTIC EXERCISES: CPT

## 2024-07-26 NOTE — PROGRESS NOTES
[address]   [place] [id number]  Phone:[phone]      Physical TherapyTreatment Note        Date: 2024  Patient: Tulio Wilson  : 1945   Confirmed: Yes  MRN: [id number]  Referring Provider: Jennifer Garcia PA-C  Medical Diagnosis: Chronic right-sided low back pain, unspecified whether sciatica present [M54.50, G89.29]  Chronic pain of left knee [M25.562, G89.29]   Treatment Diagnosis: LBP with decreased ROM, strength affecting overall functional tolerance.    Visit Information:  Insurance: Payor: MEDICARE / Plan: MEDICARE PART A AND B / Product Type: *No Product type* /   PT Visit Information  Total # of Visits Approved:  (BMN,medicare guidelines)  Total # of Visits to Date: 4  Plan of Care/Certification Expiration Date: 24  No Show: 0  Progress Note Due Date: 24  Canceled Appointment: 0  Progress Note Counter:  (PN due 24)    Subjective Information:  Subjective: Pt still gets a lot of low back pain doing a lot of bending such as in the yard cleaning up after dog. The stretches are helping with getting his legs up easier. Pt still only doing hamstring and piriformis stretches at home.  HEP Compliance:  [] Good [x] Fair  [] Poor [] Reports not doing due to:    Pain Screening  Patient Currently in Pain: Yes  Pain Assessment: 0-10  Pain Level: 1  Worst Pain Level: 6 (After a lot of bending)  Pain Location: Back  Pain Orientation: Right, Left, Lower  Pain Descriptors: Dull    Treatment:  Exercises:  Exercises  Exercise 1: seated piriformis 30s x 3 B , seated hamstretch 30s x 3 B (add stool NV)  Exercise 2: hip add with ball 15 x 5\"  Exercise 3: Hip IR/ER with GTB seated x 10 ea B  Exercise 4: supine TA iso 10 x 5\" , TA march 10 x 3\" , TA SLR 10 x 3\"  Exercise 5: side lying hip abd 10 x 3\"  Exercise 6: Bridge 10 x 3\"  Exercise 7: SKTC 3 x 20-30\"  Exercise 8: standing hip ext 10 x 3\"  Treatment Reasoning  Limitations addressed: Strength, Mobility,

## 2024-07-30 ENCOUNTER — HOSPITAL ENCOUNTER (OUTPATIENT)
Dept: PHYSICAL THERAPY | Age: 79
Setting detail: THERAPIES SERIES
Discharge: HOME | End: 2024-07-30
Payer: MEDICARE

## 2024-07-30 PROCEDURE — 97110 THERAPEUTIC EXERCISES: CPT

## 2024-07-30 ASSESSMENT — PAIN DESCRIPTION - ORIENTATION: ORIENTATION: RIGHT;LEFT;LOWER

## 2024-07-30 ASSESSMENT — PAIN DESCRIPTION - LOCATION: LOCATION: BACK

## 2024-07-30 ASSESSMENT — PAIN DESCRIPTION - DESCRIPTORS: DESCRIPTORS: DULL

## 2024-07-30 ASSESSMENT — PAIN SCALES - GENERAL: PAINLEVEL_OUTOF10: 1

## 2024-07-30 NOTE — PROGRESS NOTES
[address]   [place] [address]  Phone:[phone]      Physical TherapyTreatment Note        Date: 2024  Patient: Tulio Wilson  : 1945   Confirmed: Yes  MRN: [id number]  Referring Provider: Jennifer Garcia PA-C  Medical Diagnosis: Chronic right-sided low back pain, unspecified whether sciatica present [M54.50, G89.29]  Chronic pain of left knee [M25.562, G89.29]   Treatment Diagnosis: LBP with decreased ROM, strength affecting overall functional tolerance.    Visit Information:  Insurance: Payor: MEDICARE / Plan: MEDICARE PART A AND B / Product Type: *No Product type* /   PT Visit Information  Total # of Visits Approved:  (BMN,medicare guidelines)  Total # of Visits to Date: 5  Plan of Care/Certification Expiration Date: 24  No Show: 0  Progress Note Due Date: 24  Canceled Appointment: 0  Progress Note Counter:  (PN due 24)    Subjective Information:  Subjective: Pt reports no changes in the pain he gets in his low back when he does a lot of bending.  HEP Compliance:  [x] Good [] Fair  [] Poor [] Reports not doing due to:    Pain Screening  Patient Currently in Pain: Yes  Pain Assessment: 0-10  Pain Level: 1  Pain Location: Back  Pain Orientation: Right, Left, Lower  Pain Descriptors: Dull    Treatment:  Exercises:  Exercises  Exercise 1: seated piriformis 30s x 3 B , seated hamstretch with stool 30s x 3 B - hold off on theses stretches NV and add more strengthening  Exercise 3: LTR 10 x 5\"  Exercise 4: supine TA iso 10 x 5\" , TA march 10 x 3\" , TA SLR 10 x 3\"  Exercise 5: side lying hip abd 10 x 3\"  Exercise 6: Bridge 10 x 3\"  Exercise 7: SKTC 3 x 20-30\"  Exercise 8: standing hip ext 10 x 3\"  Treatment Reasoning  Limitations addressed: Strength, Mobility, Flexibility, Pain modulation, Posture  Functional ability(s) targeted: Ambulating community distances, Tolerance to age appropriate activities, Performing self care actvities      *Indicates exercise,

## 2024-08-02 ENCOUNTER — HOSPITAL ENCOUNTER (OUTPATIENT)
Dept: PHYSICAL THERAPY | Age: 79
Setting detail: THERAPIES SERIES
Discharge: HOME | End: 2024-08-02
Payer: MEDICARE

## 2024-08-02 PROCEDURE — 97110 THERAPEUTIC EXERCISES: CPT

## 2024-08-02 ASSESSMENT — PAIN DESCRIPTION - LOCATION: LOCATION: BACK

## 2024-08-02 ASSESSMENT — PAIN DESCRIPTION - ORIENTATION: ORIENTATION: RIGHT;LEFT;LOWER

## 2024-08-02 ASSESSMENT — PAIN SCALES - GENERAL: PAINLEVEL_OUTOF10: 1

## 2024-08-02 ASSESSMENT — PAIN DESCRIPTION - DESCRIPTORS: DESCRIPTORS: DULL

## 2024-08-02 NOTE — PROGRESS NOTES
[address]   [place] [address]  Phone:[phone]      Physical TherapyTreatment Note        Date: 2024  Patient: Tulio Wilson  : 1945   Confirmed: Yes  MRN: [id number]  Referring Provider: Jennifer Garcia PA-C  Medical Diagnosis: Chronic right-sided low back pain, unspecified whether sciatica present [M54.50, G89.29]  Chronic pain of left knee [M25.562, G89.29]   Treatment Diagnosis: LBP with decreased ROM, strength affecting overall functional tolerance.    Visit Information:  Insurance: Payor: MEDICARE / Plan: MEDICARE PART A AND B / Product Type: *No Product type* /   PT Visit Information  Total # of Visits Approved:  (BMN,medicare guidelines)  Total # of Visits to Date: 6  Plan of Care/Certification Expiration Date: 24  No Show: 0  Progress Note Due Date: 24  Canceled Appointment: 0  Progress Note Counter:  (PN due 24)    Subjective Information:  Subjective: Pt has no new complaints. Still doing his hamstring and piriformis stretches consistently.  HEP Compliance:  [x] Good [] Fair  [] Poor [] Reports not doing due to:    Pain Screening  Patient Currently in Pain: Yes  Pain Assessment: 0-10  Pain Level: 1  Pain Location: Back  Pain Orientation: Right, Left, Lower  Pain Descriptors: Dull    Treatment:  Exercises:  Exercises  Exercise 2: LTR 10 x 5\"  Exercise 3: supine TA iso 10 x 5\" , TA march 10 x 3\" , TA SLR 10 x 3\"  Exercise 4: side lying hip abd 10 x 3\"  Exercise 5: Bridge 10 x 3\"  Exercise 6: SKTC 3 x 20-30\"  Exercise 7: standing hip ext 10 x 3\" , hip abd 10 x 3\"  Exercise 8: stand TA shoulder ext with BTB 10 x 3\"  Exercise 9: Back Ext mach (foot plate 4) 45# x 20  Treatment Reasoning  Limitations addressed: Strength, Mobility, Flexibility, Pain modulation, Posture  Functional ability(s) targeted: Ambulating community distances, Tolerance to age appropriate activities, Performing self care actvities      *Indicates exercise, modality, or manual

## 2024-08-07 ENCOUNTER — HOSPITAL ENCOUNTER (OUTPATIENT)
Dept: PHYSICAL THERAPY | Age: 79
Setting detail: THERAPIES SERIES
Discharge: HOME | End: 2024-08-07
Payer: MEDICARE

## 2024-08-07 PROCEDURE — 97110 THERAPEUTIC EXERCISES: CPT

## 2024-08-07 ASSESSMENT — PAIN SCALES - GENERAL: PAINLEVEL_OUTOF10: 2

## 2024-08-07 ASSESSMENT — PAIN DESCRIPTION - DESCRIPTORS: DESCRIPTORS: DULL

## 2024-08-07 ASSESSMENT — PAIN DESCRIPTION - LOCATION: LOCATION: BACK

## 2024-08-07 ASSESSMENT — PAIN DESCRIPTION - PAIN TYPE: TYPE: CHRONIC PAIN

## 2024-08-07 ASSESSMENT — PAIN DESCRIPTION - ORIENTATION: ORIENTATION: RIGHT;LEFT;LOWER

## 2024-08-07 NOTE — PROGRESS NOTES
[address]   [place] [address]  Phone:[phone]      Physical TherapyTreatment Note        Date: 2024  Patient: Tulio Wilson  : 1945   Confirmed: Yes  MRN: [id number]  Referring Provider: Jennifer Garcia PA-C      Medical Diagnosis: Chronic right-sided low back pain, unspecified whether sciatica present [M54.50, G89.29]  Chronic pain of left knee [M25.562, G89.29]      Treatment Diagnosis: LBP with decreased ROM, strength affecting overall functional tolerance.    Visit Information:  Insurance: Payor: MEDICARE / Plan: MEDICARE PART A AND B / Product Type: *No Product type* /   PT Visit Information  Total # of Visits Approved:  (BMN,medicare guidelines)  Total # of Visits to Date: 7  Plan of Care/Certification Expiration Date: 24  No Show: 0  Progress Note Due Date: 24  Canceled Appointment: 0  Progress Note Counter:  (PN due 24)    Subjective Information:  Subjective: Pt reported decrease in R LE pain noted when lifting leg into the tub.  Able to lift it higher.  Increase low back pain noted with activity and leaning forward, but the pain is less. Bending and leaning forward increase pain , hard to put on R pant leg, weakness and pain. Pt reported L knee pain is not bad.  Increase pain noted at night and rolling around.  HEP Compliance:  [x] Good [] Fair [] Poor [] Reports not doing due to:    Pain Screening  Patient Currently in Pain: Yes  Pain Assessment: 0-10  Pain Level: 2  Worst Pain Level: 7  Pain Type: Chronic pain  Pain Location: Back  Pain Orientation: Right, Left, Lower  Pain Descriptors: Dull    Treatment:  Exercises:  Exercises  Exercise 2: LTR 10 x 5\"  Exercise 3: supine TA iso 10 x 5\" , TA march 10 x 3\" , TA SLR 10 x 3\"  Exercise 4: side lying hip abd 10 x 3\"  Exercise 5: Bridge 10 x 3\"  Exercise 6: SKTC 3 x 20-30\"  Exercise 7: standing hip ext 10 x 3\" , hip abd 10 x 3\"  Exercise 8: stand TA shoulder ext with BTB 10 x 3\"  Exercise 9: Back

## 2024-08-09 ENCOUNTER — HOSPITAL ENCOUNTER (OUTPATIENT)
Dept: PHYSICAL THERAPY | Age: 79
Setting detail: THERAPIES SERIES
Discharge: HOME | End: 2024-08-09
Payer: MEDICARE

## 2024-08-09 PROCEDURE — 97110 THERAPEUTIC EXERCISES: CPT

## 2024-08-09 ASSESSMENT — PAIN DESCRIPTION - LOCATION: LOCATION: BACK

## 2024-08-09 ASSESSMENT — PAIN DESCRIPTION - ORIENTATION: ORIENTATION: LOWER

## 2024-08-09 ASSESSMENT — PAIN DESCRIPTION - PAIN TYPE: TYPE: CHRONIC PAIN

## 2024-08-09 NOTE — PROGRESS NOTES
[address]     [place] [address]      Phone:[phone]      PHYSICAL THERAPY PLAN OF CARE     [] Certification  [] Recertification []  Plan of Care  [] Progress Note [x] Discharge      Referring Provider: Jennifer Garcia PA-C    From:  Annette Manrique, PT     Patient: Tulio Wilson (79 y.o. male) : 1945 Date: 2024   Medical Diagnosis: Chronic right-sided low back pain, unspecified whether sciatica present [M54.50, G89.29]  Chronic pain of left knee [M25.562, G89.29]    Treatment Diagnosis: LBP with decreased ROM, strength affecting overall functional tolerance.    Plan of Care/Certification Expiration Date: 24   Progress Report Period from: 2024  to 2024    Visits to Date: 8 No Show: 0 Cancelled Appts: 0    OBJECTIVE:   Short Term Goals - Time Frame for Short Term Goals: 2 weeks    Goals Current/Discharge status  Status   Short Term Goal 1: Decrease Lumbar and L knee pain <2/10 to assist with improved functional gains.  STG 1 Current Status:: Lumbar pain 0/10 , L knee pain 0/10 with improved functional gains   Met   Short Term Goal 2: Demo increased hamstring flexibility -28° at 90/90 hip/knee to allow improved upright posture.  STG 2 Current Status:: L hamstring flexibility -35° at 90/90 hip/knee , R hamstring flexibility -32° at 90/90 hip/knee   Not Met       Long Term Goals - Time Frame for Long Term Goals : 6 weeks  Goals Current/ Discharge status Status   Long Term Goal 1: Indep HEP for symptom management LTG 1 Current Status:: Compliant and independent with HEP   Met   Long Term Goal 2: Pt demo improved overall function by reporting greater than 70% per Mod Oswestry and LEFS functional survey score LTG 2 Current Status:: Mod Oswestry 11/50=78% functional , LEFS 79/80=99% functioanl   Met   Long Term Goal 3: Pain-free R hip IR/ER >/=15° AROM to WNL allowing an increase in ADL and less difficulty donning R sock. LTG 3 Current Status:: R hip ER 30, IR 36

## 2024-08-09 NOTE — PROGRESS NOTES
[address] [address]  Phone:[phone]      Physical TherapyTreatment Note        Date: 2024  Patient: Tulio Wilson  : 1945   Confirmed: Yes  MRN: [id number]  Referring Provider: Jennifer Garcia PA-C  Medical Diagnosis: Chronic right-sided low back pain, unspecified whether sciatica present [M54.50, G89.29]  Chronic pain of left knee [M25.562, G89.29]   Treatment Diagnosis: LBP with decreased ROM, strength affecting overall functional tolerance.    Visit Information:  Insurance: Payor: MEDICARE / Plan: MEDICARE PART A AND B / Product Type: *No Product type* /   PT Visit Information  Total # of Visits Approved:  (BMN,medicare guidelines)  Total # of Visits to Date: 8  Plan of Care/Certification Expiration Date: 24  No Show: 0  Progress Note Due Date: 24  Canceled Appointment: 0  Progress Note Counter:  (PN due 24)    Subjective Information:  Subjective: Pt is feeling good. Denies pain this morning. Pt feels comfortable enough with the exercises he learned in PT and would like to be discharged today.  HEP Compliance:  [x] Good [] Fair  [] Poor [] Reports not doing due to:    Pain Screening  Patient Currently in Pain: Denies  Pain Type: Chronic pain  Pain Location: Back  Pain Orientation: Lower    Treatment:  Exercises:  Exercises  Exercise 2: LTR 10 x 5\"  Exercise 3: supine TA iso 10 x 5\" , TA march 10 x 3\" , TA SLR 10 x 3\"  Exercise 4: side lying hip abd 10 x 3\"  Exercise 5: Bridge 10 x 3\"  Exercise 6: SKTC 3 x 20-30\"  Exercise 7: standing hip ext 10 x 3\" , hip abd 10 x 3\"  Exercise 8: stand TA shoulder ext with BTB 10 x 3\"  Exercise 9: Back Ext mach (foot plate 4) 45# x 20  Exercise 15: Objective measures taken  Treatment Reasoning  Limitations addressed: Strength, Mobility, Flexibility, Pain modulation, Posture  Functional ability(s) targeted: Ambulating community distances, Tolerance to age appropriate activities, Performing self care

## 2024-08-13 ENCOUNTER — APPOINTMENT (OUTPATIENT)
Dept: PHYSICAL THERAPY | Age: 79
End: 2024-08-13
Payer: MEDICARE

## 2024-08-16 ENCOUNTER — APPOINTMENT (OUTPATIENT)
Dept: PHYSICAL THERAPY | Age: 79
End: 2024-08-16
Payer: MEDICARE

## 2024-08-20 ENCOUNTER — APPOINTMENT (OUTPATIENT)
Dept: PHYSICAL THERAPY | Age: 79
End: 2024-08-20
Payer: MEDICARE

## 2024-08-23 ENCOUNTER — APPOINTMENT (OUTPATIENT)
Dept: PHYSICAL THERAPY | Age: 79
End: 2024-08-23
Payer: MEDICARE

## 2024-08-27 ENCOUNTER — APPOINTMENT (OUTPATIENT)
Dept: PHYSICAL THERAPY | Age: 79
End: 2024-08-27
Payer: MEDICARE

## 2024-08-30 ENCOUNTER — APPOINTMENT (OUTPATIENT)
Dept: PHYSICAL THERAPY | Age: 79
End: 2024-08-30
Payer: MEDICARE

## 2024-09-04 DIAGNOSIS — I25.10 CORONARY ARTERY DISEASE INVOLVING NATIVE CORONARY ARTERY OF NATIVE HEART WITHOUT ANGINA PECTORIS: ICD-10-CM

## 2024-09-05 RX ORDER — ATENOLOL 50 MG/1
50 TABLET ORAL NIGHTLY
Qty: 90 TABLET | Refills: 3 | Status: SHIPPED | OUTPATIENT
Start: 2024-09-05

## 2024-09-05 NOTE — TELEPHONE ENCOUNTER
Comments:     Last Office Visit (last PCP visit):   6/6/2024    Next Visit Date:  Future Appointments   Date Time Provider Department Center   9/23/2024  8:00 AM Adam Hopson DO [facility]   12/6/2024  8:30 AM Jennifer Garcia PA-C [facility]   1/7/2025  8:00 AM Holcaryl, DO [facility]   6/20/2025  8:00 AM Slavik-Bosworth, Kelly J, APRN - CNP [facility]       **If hasn't been seen in over a year OR hasn't followed up according to last diabetes/ADHD visit, make appointment for patient before sending refill to provider.    Rx requested:  Requested Prescriptions     Pending Prescriptions Disp Refills    atenolol (TENORMIN) 50 MG tablet [Pharmacy Med Name: ATENOLOL TABS 50MG] 90 tablet 3     Sig: TAKE 1 TABLET NIGHTLY

## 2024-09-23 ENCOUNTER — OFFICE VISIT (OUTPATIENT)
Dept: ORTHOPEDIC SURGERY | Age: 79
End: 2024-09-23

## 2024-09-23 VITALS — HEART RATE: 50 BPM | WEIGHT: 197 LBS | HEIGHT: 64 IN | BODY MASS INDEX: 33.63 KG/M2 | OXYGEN SATURATION: 99 %

## 2024-09-23 DIAGNOSIS — M51.36 DEGENERATIVE DISC DISEASE, LUMBAR: Primary | ICD-10-CM

## 2024-09-23 DIAGNOSIS — M81.0 AGE RELATED OSTEOPOROSIS, UNSPECIFIED PATHOLOGICAL FRACTURE PRESENCE: ICD-10-CM

## 2024-09-28 DIAGNOSIS — K22.719 BARRETT'S ESOPHAGUS WITH DYSPLASIA: Chronic | ICD-10-CM

## 2024-09-30 RX ORDER — PANTOPRAZOLE SODIUM 40 MG/1
TABLET, DELAYED RELEASE ORAL
Qty: 90 TABLET | Refills: 3 | Status: SHIPPED | OUTPATIENT
Start: 2024-09-30

## 2024-09-30 NOTE — TELEPHONE ENCOUNTER
Comments:     Last Office Visit (last PCP visit):   6/6/2024    Next Visit Date:  Future Appointments   Date Time Provider Department Center   12/6/2024  8:30 AM Jennifer Garcia PA-C [facility]   1/7/2025  8:00 AM Holcaryl, DO [facility]   6/20/2025  8:00 AM Slavik-Bosworth, Kelly J, APRN - CNP [facility]       **If hasn't been seen in over a year OR hasn't followed up according to last diabetes/ADHD visit, make appointment for patient before sending refill to provider.    Rx requested:  Requested Prescriptions     Pending Prescriptions Disp Refills    pantoprazole (PROTONIX) 40 MG tablet [Pharmacy Med Name: PANTOPRAZOLE SODIUM DR TABS 40MG] 90 tablet 3     Sig: TAKE 1 TABLET DAILY

## 2024-11-10 DIAGNOSIS — E78.00 PURE HYPERCHOLESTEROLEMIA: ICD-10-CM

## 2024-11-12 RX ORDER — ROSUVASTATIN CALCIUM 20 MG/1
20 TABLET, COATED ORAL NIGHTLY
Qty: 90 TABLET | Refills: 3 | Status: SHIPPED | OUTPATIENT
Start: 2024-11-12

## 2024-12-23 DIAGNOSIS — I10 ESSENTIAL HYPERTENSION: Chronic | ICD-10-CM

## 2024-12-23 NOTE — TELEPHONE ENCOUNTER
Comments:     Last Office Visit (last PCP visit):   6/6/2024    Next Visit Date:  Future Appointments   Date Time Provider Department Center   3/25/2025  9:30 AM Howard Ackerman DO [facility]   6/9/2025  9:00 AM Jennifer Garcia PA-C [facility] DE[facility]       **If hasn't been seen in over a year OR hasn't followed up according to last diabetes/ADHD visit, make appointment for patient before sending refill to provider.    Rx requested:  Requested Prescriptions     Pending Prescriptions Disp Refills    lisinopril (PRINIVIL;ZESTRIL) 10 MG tablet [Pharmacy Med Name: LISINOPRIL TABS 10MG] 90 tablet 3     Sig: TAKE 1 TABLET DAILY

## 2024-12-24 RX ORDER — LISINOPRIL 10 MG/1
10 TABLET ORAL DAILY
Qty: 90 TABLET | Refills: 3 | Status: SHIPPED | OUTPATIENT
Start: 2024-12-24

## 2025-02-11 NOTE — TELEPHONE ENCOUNTER
Comments:     Last Office Visit (last PCP visit):   6/6/2024    Next Visit Date:  Future Appointments   Date Time Provider Department Center   12/6/2024  8:30 AM Jennifer Garcia PA-C [facility] DEP   1/7/2025  8:00 AM Holcaryl, DO [facility]   6/20/2025  8:00 AM Slavik-Bosworth, Kelly J, APRN - CNP [facility]         Rx requested:  Requested Prescriptions     Pending Prescriptions Disp Refills    rosuvastatin (CRESTOR) 20 MG tablet [Pharmacy Med Name: ROSUVASTATIN TABS 20MG] 90 tablet 3     Sig: Take 1 tablet by mouth nightly                    Home

## 2025-03-15 DIAGNOSIS — I10 ESSENTIAL HYPERTENSION: Chronic | ICD-10-CM

## 2025-03-17 RX ORDER — ISOSORBIDE MONONITRATE 60 MG/1
60 TABLET, EXTENDED RELEASE ORAL DAILY
Qty: 90 TABLET | Refills: 3 | Status: SHIPPED | OUTPATIENT
Start: 2025-03-17

## 2025-03-17 NOTE — TELEPHONE ENCOUNTER
Comments:     Last Office Visit (last PCP visit):   6/6/2024    Next Visit Date:  Future Appointments   Date Time Provider Department Center   3/25/2025  9:30 AM Howard Ackerman DO [facility]   6/9/2025  9:00 AM Jennifer Garcia PA-C [facility] DEP         Rx requested:  Requested Prescriptions     Pending Prescriptions Disp Refills    isosorbide mononitrate (IMDUR) 60 MG extended release tablet [Pharmacy Med Name: ISOSORBIDE MONONITRATE ER TABS 60MG] 90 tablet 3     Sig: TAKE 1 TABLET DAILY

## 2025-03-25 ENCOUNTER — OFFICE VISIT (OUTPATIENT)
Dept: CARDIOLOGY CLINIC | Age: 80
End: 2025-03-25

## 2025-03-25 VITALS
BODY MASS INDEX: 34.16 KG/M2 | DIASTOLIC BLOOD PRESSURE: 80 MMHG | SYSTOLIC BLOOD PRESSURE: 110 MMHG | WEIGHT: 199 LBS | HEART RATE: 60 BPM

## 2025-03-25 DIAGNOSIS — I25.10 CORONARY ARTERY DISEASE INVOLVING NATIVE CORONARY ARTERY OF NATIVE HEART WITHOUT ANGINA PECTORIS: Primary | ICD-10-CM

## 2025-03-25 DIAGNOSIS — E78.00 PURE HYPERCHOLESTEROLEMIA: ICD-10-CM

## 2025-03-25 DIAGNOSIS — I10 ESSENTIAL HYPERTENSION: ICD-10-CM

## 2025-03-25 DIAGNOSIS — R94.31 ABNORMAL EKG: ICD-10-CM

## 2025-03-25 DIAGNOSIS — Z87.891 H/O NICOTINE DEPENDENCE: ICD-10-CM

## 2025-03-25 NOTE — PROGRESS NOTES
Chief Complaint   Patient presents with    Coronary Artery Disease    Hypertension       4-12-22: Patient presents for initial medical evaluation. Patient is followed on a regular basis by Jennifer Sosa, SUSANA. Hx of CAD s/p 8 stents, 7 to RCA and one CX,   Hypertension, hyperlipidemia, Shaw's esophagus, who presents for general cardiology follow-up.  Previous [facility] patient.  Patient underwent cardiac catheterization 2019 with no PCI performed at that time.  Pt denies chest pain, dyspnea, dyspnea on exertion, change in exercise capacity, fatigue,  nausea, vomiting, diarrhea, constipation, motor weakness, insomnia, weight loss, syncope, dizziness, lightheadedness, palpitations, PND, orthopnea. + cramps at night.   Patient with history of recurrent prostate cancer, undergoing Radiation tx. LDL is 68  EKG with SB at 50bpm, non specific changes.       4/18/2023: Patient is doing well overall.  Does have history of coronary disease status post multivessel PCI with 7 stents in the right coronary and 1 to the circumflex.  Last cardiac catheterization in 2019 and no PCI was performed then.    Patient did have 1 episode of chest pain 6 months ago where he visited the ER and was discharged home.  Patient does also complain of shortness of breath with exertion.  Work-up was negative at that time.  BP home log is ok.  Lipid profile is normal with an LDL of 53.  EKG with normal sinus rhythm, T wave inversions in inferior leads.  Which appears to be new as compared to previous.    6/13/23: pt seen in office today to discuss test results.  Pt had echo on 6/7/23 which showed:   Summary   Normal tricuspid valve structure and function.   1+ TR   RVSP 55 mmHg   Normal left ventricle structure and function.   Left ventricular ejection fraction is visually estimated at 55-60%.   Pseudonormal filling pattern noted.     Pt had stress test on 6/7/23 which showed:  Images reveal large predominantly fixed perfusion defect

## 2025-04-09 ENCOUNTER — OFFICE VISIT (OUTPATIENT)
Age: 80
End: 2025-04-09
Payer: MEDICARE

## 2025-04-09 ENCOUNTER — HOSPITAL ENCOUNTER (OUTPATIENT)
Dept: ORTHOPEDIC SURGERY | Age: 80
Discharge: HOME | End: 2025-04-11
Payer: MEDICARE

## 2025-04-09 VITALS
HEIGHT: 64 IN | BODY MASS INDEX: 33.97 KG/M2 | TEMPERATURE: 97.6 F | RESPIRATION RATE: 18 BRPM | OXYGEN SATURATION: 99 % | HEART RATE: 54 BPM | WEIGHT: 199 LBS

## 2025-04-09 DIAGNOSIS — M51.360 DEGENERATION OF INTERVERTEBRAL DISC OF LUMBAR REGION WITH DISCOGENIC BACK PAIN: ICD-10-CM

## 2025-04-09 DIAGNOSIS — M51.360 DEGENERATION OF INTERVERTEBRAL DISC OF LUMBAR REGION WITH DISCOGENIC BACK PAIN: Primary | ICD-10-CM

## 2025-04-09 PROCEDURE — 1159F MED LIST DOCD IN RCRD: CPT | Performed by: ORTHOPAEDIC SURGERY

## 2025-04-09 PROCEDURE — 1036F TOBACCO NON-USER: CPT | Performed by: ORTHOPAEDIC SURGERY

## 2025-04-09 PROCEDURE — 99212 OFFICE O/P EST SF 10 MIN: CPT | Performed by: ORTHOPAEDIC SURGERY

## 2025-04-09 PROCEDURE — 72110 X-RAY EXAM L-2 SPINE 4/>VWS: CPT | Performed by: ORTHOPAEDIC SURGERY

## 2025-04-09 PROCEDURE — 99213 OFFICE O/P EST LOW 20 MIN: CPT | Performed by: ORTHOPAEDIC SURGERY

## 2025-04-09 PROCEDURE — 72110 X-RAY EXAM L-2 SPINE 4/>VWS: CPT

## 2025-04-09 PROCEDURE — 1123F ACP DISCUSS/DSCN MKR DOCD: CPT | Performed by: ORTHOPAEDIC SURGERY

## 2025-04-09 PROCEDURE — G8427 DOCREV CUR MEDS BY ELIG CLIN: HCPCS | Performed by: ORTHOPAEDIC SURGERY

## 2025-04-09 PROCEDURE — G8417 CALC BMI ABV UP PARAM F/U: HCPCS | Performed by: ORTHOPAEDIC SURGERY

## 2025-04-09 NOTE — PROGRESS NOTES
having used the following drugs: Marijuana (Weed) and Cocaine.  --------------------------------------------------------------------------------------------------------------  No Known Allergies  --------------------------------------------------------------------------------------------------------------    Current Outpatient Medications:     isosorbide mononitrate (IMDUR) 60 MG extended release tablet, TAKE 1 TABLET DAILY, Disp: 90 tablet, Rfl: 3    lisinopril (PRINIVIL;ZESTRIL) 10 MG tablet, TAKE 1 TABLET DAILY, Disp: 90 tablet, Rfl: 3    rosuvastatin (CRESTOR) 20 MG tablet, Take 1 tablet by mouth nightly, Disp: 90 tablet, Rfl: 3    pantoprazole (PROTONIX) 40 MG tablet, TAKE 1 TABLET DAILY, Disp: 90 tablet, Rfl: 3    atenolol (TENORMIN) 50 MG tablet, TAKE 1 TABLET NIGHTLY, Disp: 90 tablet, Rfl: 3    psyllium (METAMUCIL) 28 % packet, Take 1 packet by mouth 2 times daily Take with full glass of h20 or juice, Disp: , Rfl:     Magnesium 400 MG CAPS, Take 1 capsule by mouth daily (Patient taking differently: Take 1 capsule by mouth nightly), Disp: 90 capsule, Rfl: 4    nitroGLYCERIN (NITROSTAT) 0.4 MG SL tablet, Place 1 tablet under the tongue every 5 minutes as needed for Chest pain up to max of 3 total doses. If no relief after 1 dose, call 911., Disp: , Rfl:     aspirin 81 MG tablet, Take 1 tablet by mouth daily, Disp: , Rfl:     Cholecalciferol (VITAMIN D3) 1000 units TABS, Take by mouth daily , Disp: , Rfl:   --------------------------------------------------------------------------------------------------------------    Adam Hopson DO  Orthopedic and Spine Surgeon  [facility]  [phone]

## 2025-05-01 ENCOUNTER — HOSPITAL ENCOUNTER (OUTPATIENT)
Dept: MRI IMAGING | Age: 80
Discharge: HOME | End: 2025-05-03
Attending: ORTHOPAEDIC SURGERY
Payer: MEDICARE

## 2025-05-01 DIAGNOSIS — M51.360 DEGENERATION OF INTERVERTEBRAL DISC OF LUMBAR REGION WITH DISCOGENIC BACK PAIN: ICD-10-CM

## 2025-05-01 PROCEDURE — 72158 MRI LUMBAR SPINE W/O & W/DYE: CPT

## 2025-05-01 PROCEDURE — 6360000004 HC RX CONTRAST MEDICATION: Performed by: PHYSICIAN ASSISTANT

## 2025-05-01 PROCEDURE — A9579 GAD-BASE MR CONTRAST NOS,1ML: HCPCS | Performed by: PHYSICIAN ASSISTANT

## 2025-05-01 RX ADMIN — GADOTERIDOL 20 ML: 279.3 INJECTION, SOLUTION INTRAVENOUS at 08:16

## 2025-05-16 ENCOUNTER — OFFICE VISIT (OUTPATIENT)
Age: 80
End: 2025-05-16
Payer: MEDICARE

## 2025-05-16 VITALS
WEIGHT: 195 LBS | HEART RATE: 52 BPM | BODY MASS INDEX: 33.29 KG/M2 | OXYGEN SATURATION: 98 % | TEMPERATURE: 97.5 F | HEIGHT: 64 IN

## 2025-05-16 DIAGNOSIS — M47.816 FACET ARTHROPATHY, LUMBAR: ICD-10-CM

## 2025-05-16 DIAGNOSIS — M48.061 SPINAL STENOSIS OF LUMBAR REGION WITHOUT NEUROGENIC CLAUDICATION: Primary | ICD-10-CM

## 2025-05-16 PROCEDURE — 1036F TOBACCO NON-USER: CPT | Performed by: ORTHOPAEDIC SURGERY

## 2025-05-16 PROCEDURE — 1123F ACP DISCUSS/DSCN MKR DOCD: CPT | Performed by: ORTHOPAEDIC SURGERY

## 2025-05-16 PROCEDURE — 99213 OFFICE O/P EST LOW 20 MIN: CPT | Performed by: ORTHOPAEDIC SURGERY

## 2025-05-16 PROCEDURE — 1125F AMNT PAIN NOTED PAIN PRSNT: CPT | Performed by: ORTHOPAEDIC SURGERY

## 2025-05-16 PROCEDURE — 99214 OFFICE O/P EST MOD 30 MIN: CPT | Performed by: ORTHOPAEDIC SURGERY

## 2025-05-16 PROCEDURE — G8417 CALC BMI ABV UP PARAM F/U: HCPCS | Performed by: ORTHOPAEDIC SURGERY

## 2025-05-16 PROCEDURE — 1159F MED LIST DOCD IN RCRD: CPT | Performed by: ORTHOPAEDIC SURGERY

## 2025-05-16 PROCEDURE — G8427 DOCREV CUR MEDS BY ELIG CLIN: HCPCS | Performed by: ORTHOPAEDIC SURGERY

## 2025-05-16 RX ORDER — LIDOCAINE 50 MG/G
1 PATCH TOPICAL DAILY
Qty: 7 PATCH | Refills: 0 | Status: SHIPPED | OUTPATIENT
Start: 2025-05-16 | End: 2025-05-23

## 2025-05-16 NOTE — PROGRESS NOTES
Subjective:      Patient ID: Tulio Wilson is a 80 y.o. male who presents today for:  Chief Complaint   Patient presents with    Follow-up     Review MRI results of spine        Subjective/Objective/Assessment/Plan:     SUBJECTIVE -patient comes in with back pain.  States that is been going on for many years.  Does not really talk about any type of radicular complaint into his legs.  He just began physical therapy.    OBJECTIVE - The patient can rise up on their toes and rise up on her heels.  5 out of 5 hip flexion and knee extension strength bilaterally.  Sensation intact bilaterally in the lower extremities from L2-S1.      MRI LUMBAR SPINE W WO CONTRAST  Narrative: EXAMINATION:  MRI OF THE LUMBAR SPINE WITHOUT AND WITH CONTRAST  5/1/2025 7:54 am    TECHNIQUE:  Multiplanar multisequence MRI of the lumbar spine was performed without and  with the administration of intravenous contrast.    COMPARISON:  None.    HISTORY:  ORDERING SYSTEM PROVIDED HISTORY: Degeneration of intervertebral disc of  lumbar region with discogenic back pain  TECHNOLOGIST PROVIDED HISTORY:  STAT Creatinine as needed:->Yes  Reason for exam:->BACKPAIN  What is the sedation requirement?->None  What reading provider will be dictating this exam?->CRC    FINDINGS:  BONES/ALIGNMENT: There is normal alignment of the spine. The vertebral body  heights are maintained. The bone marrow signal appears unremarkable.    SPINAL CORD:  The conus terminates normally.    SOFT TISSUES: No abnormal enhancement is seen of the lumbar spine.  No  paraspinal mass identified.    L1-L2: Mild disc desiccation with minimal disc bulge.  No significant central  canal, lateral recess or neural foraminal stenoses.    L2-L3: There is no significant disc protrusion, spinal canal stenosis or  neural foraminal narrowing.    L3-L4: Mild disc desiccation with minimal disc bulge.  Mild facet  hypertrophy.  Mild-to-moderate central canal stenosis.  Mild lateral recess  stenoses.

## 2025-07-08 SDOH — HEALTH STABILITY: PHYSICAL HEALTH: ON AVERAGE, HOW MANY MINUTES DO YOU ENGAGE IN EXERCISE AT THIS LEVEL?: 0 MIN

## 2025-07-08 SDOH — HEALTH STABILITY: PHYSICAL HEALTH: ON AVERAGE, HOW MANY DAYS PER WEEK DO YOU ENGAGE IN MODERATE TO STRENUOUS EXERCISE (LIKE A BRISK WALK)?: 0 DAYS

## 2025-07-08 SDOH — ECONOMIC STABILITY: INCOME INSECURITY: IN THE LAST 12 MONTHS, WAS THERE A TIME WHEN YOU WERE NOT ABLE TO PAY THE MORTGAGE OR RENT ON TIME?: NO

## 2025-07-08 SDOH — ECONOMIC STABILITY: FOOD INSECURITY: WITHIN THE PAST 12 MONTHS, THE FOOD YOU BOUGHT JUST DIDN'T LAST AND YOU DIDN'T HAVE MONEY TO GET MORE.: NEVER TRUE

## 2025-07-08 SDOH — ECONOMIC STABILITY: FOOD INSECURITY: WITHIN THE PAST 12 MONTHS, YOU WORRIED THAT YOUR FOOD WOULD RUN OUT BEFORE YOU GOT MONEY TO BUY MORE.: NEVER TRUE

## 2025-07-08 ASSESSMENT — PATIENT HEALTH QUESTIONNAIRE - PHQ9
9. THOUGHTS THAT YOU WOULD BE BETTER OFF DEAD, OR OF HURTING YOURSELF: SEVERAL DAYS
10. IF YOU CHECKED OFF ANY PROBLEMS, HOW DIFFICULT HAVE THESE PROBLEMS MADE IT FOR YOU TO DO YOUR WORK, TAKE CARE OF THINGS AT HOME, OR GET ALONG WITH OTHER PEOPLE: NOT DIFFICULT AT ALL
6. FEELING BAD ABOUT YOURSELF - OR THAT YOU ARE A FAILURE OR HAVE LET YOURSELF OR YOUR FAMILY DOWN: MORE THAN HALF THE DAYS
SUM OF ALL RESPONSES TO PHQ QUESTIONS 1-9: 14
3. TROUBLE FALLING OR STAYING ASLEEP: NEARLY EVERY DAY
2. FEELING DOWN, DEPRESSED OR HOPELESS: NEARLY EVERY DAY
8. MOVING OR SPEAKING SO SLOWLY THAT OTHER PEOPLE COULD HAVE NOTICED. OR THE OPPOSITE, BEING SO FIGETY OR RESTLESS THAT YOU HAVE BEEN MOVING AROUND A LOT MORE THAN USUAL: NOT AT ALL
SUM OF ALL RESPONSES TO PHQ QUESTIONS 1-9: 14
SUM OF ALL RESPONSES TO PHQ QUESTIONS 1-9: 13
SUM OF ALL RESPONSES TO PHQ QUESTIONS 1-9: 14
5. POOR APPETITE OR OVEREATING: SEVERAL DAYS
1. LITTLE INTEREST OR PLEASURE IN DOING THINGS: MORE THAN HALF THE DAYS
7. TROUBLE CONCENTRATING ON THINGS, SUCH AS READING THE NEWSPAPER OR WATCHING TELEVISION: NOT AT ALL
4. FEELING TIRED OR HAVING LITTLE ENERGY: MORE THAN HALF THE DAYS

## 2025-07-08 ASSESSMENT — LIFESTYLE VARIABLES
HOW OFTEN DO YOU HAVE SIX OR MORE DRINKS ON ONE OCCASION: 3
HOW OFTEN DURING THE LAST YEAR HAVE YOU HAD A FEELING OF GUILT OR REMORSE AFTER DRINKING: LESS THAN MONTHLY
HOW OFTEN DO YOU HAVE A DRINK CONTAINING ALCOHOL: 4
HOW OFTEN DO YOU HAVE A DRINK CONTAINING ALCOHOL: 2-3 TIMES A WEEK
HOW OFTEN DURING THE LAST YEAR HAVE YOU FOUND THAT YOU WERE NOT ABLE TO STOP DRINKING ONCE YOU HAD STARTED: MONTHLY
HOW MANY STANDARD DRINKS CONTAINING ALCOHOL DO YOU HAVE ON A TYPICAL DAY: 1 OR 2
HOW OFTEN DURING THE LAST YEAR HAVE YOU FAILED TO DO WHAT WAS NORMALLY EXPECTED FROM YOU BECAUSE OF DRINKING: LESS THAN MONTHLY
HOW OFTEN DURING THE LAST YEAR HAVE YOU FOUND THAT YOU WERE NOT ABLE TO STOP DRINKING ONCE YOU HAD STARTED: MONTHLY
HAS A RELATIVE, FRIEND, DOCTOR, OR ANOTHER HEALTH PROFESSIONAL EXPRESSED CONCERN ABOUT YOUR DRINKING OR SUGGESTED YOU CUT DOWN: YES, DURING THE PAST YEAR
HOW OFTEN DURING THE LAST YEAR HAVE YOU NEEDED AN ALCOHOLIC DRINK FIRST THING IN THE MORNING TO GET YOURSELF GOING AFTER A NIGHT OF HEAVY DRINKING: NEVER
HAVE YOU OR SOMEONE ELSE BEEN INJURED AS A RESULT OF YOUR DRINKING: NO
HAS A RELATIVE, FRIEND, DOCTOR, OR ANOTHER HEALTH PROFESSIONAL EXPRESSED CONCERN ABOUT YOUR DRINKING OR SUGGESTED YOU CUT DOWN: YES, DURING THE PAST YEAR
HOW OFTEN DURING THE LAST YEAR HAVE YOU FAILED TO DO WHAT WAS NORMALLY EXPECTED FROM YOU BECAUSE OF DRINKING: LESS THAN MONTHLY
HAVE YOU OR SOMEONE ELSE BEEN INJURED AS A RESULT OF YOUR DRINKING: NO
HOW MANY STANDARD DRINKS CONTAINING ALCOHOL DO YOU HAVE ON A TYPICAL DAY: 1
HOW OFTEN DURING THE LAST YEAR HAVE YOU HAD A FEELING OF GUILT OR REMORSE AFTER DRINKING: LESS THAN MONTHLY
HOW OFTEN DURING THE LAST YEAR HAVE YOU BEEN UNABLE TO REMEMBER WHAT HAPPENED THE NIGHT BEFORE BECAUSE YOU HAD BEEN DRINKING: LESS THAN MONTHLY
HOW OFTEN DURING THE LAST YEAR HAVE YOU BEEN UNABLE TO REMEMBER WHAT HAPPENED THE NIGHT BEFORE BECAUSE YOU HAD BEEN DRINKING: LESS THAN MONTHLY
HOW OFTEN DURING THE LAST YEAR HAVE YOU NEEDED AN ALCOHOLIC DRINK FIRST THING IN THE MORNING TO GET YOURSELF GOING AFTER A NIGHT OF HEAVY DRINKING: NEVER

## 2025-07-11 ENCOUNTER — OFFICE VISIT (OUTPATIENT)
Age: 80
End: 2025-07-11

## 2025-07-11 VITALS
BODY MASS INDEX: 33.81 KG/M2 | WEIGHT: 197 LBS | SYSTOLIC BLOOD PRESSURE: 110 MMHG | OXYGEN SATURATION: 98 % | DIASTOLIC BLOOD PRESSURE: 78 MMHG | HEART RATE: 54 BPM | RESPIRATION RATE: 16 BRPM

## 2025-07-11 DIAGNOSIS — E66.01 SEVERE OBESITY (BMI 35.0-39.9) WITH COMORBIDITY (HCC): ICD-10-CM

## 2025-07-11 DIAGNOSIS — Z00.00 MEDICARE ANNUAL WELLNESS VISIT, SUBSEQUENT: Primary | ICD-10-CM

## 2025-07-11 DIAGNOSIS — I25.10 CORONARY ARTERY DISEASE INVOLVING NATIVE CORONARY ARTERY OF NATIVE HEART WITHOUT ANGINA PECTORIS: Chronic | ICD-10-CM

## 2025-07-11 DIAGNOSIS — Z87.891 PERSONAL HISTORY OF TOBACCO USE: ICD-10-CM

## 2025-07-11 DIAGNOSIS — N18.30 STAGE 3 CHRONIC KIDNEY DISEASE, UNSPECIFIED WHETHER STAGE 3A OR 3B CKD (HCC): ICD-10-CM

## 2025-07-11 DIAGNOSIS — E78.00 PURE HYPERCHOLESTEROLEMIA: ICD-10-CM

## 2025-07-11 DIAGNOSIS — I10 ESSENTIAL HYPERTENSION: Chronic | ICD-10-CM

## 2025-07-11 DIAGNOSIS — Z12.5 SCREENING PSA (PROSTATE SPECIFIC ANTIGEN): ICD-10-CM

## 2025-07-11 DIAGNOSIS — R19.00 ABDOMINAL MASS, UNSPECIFIED ABDOMINAL LOCATION: ICD-10-CM

## 2025-07-11 PROBLEM — K92.1 MELENA: Status: RESOLVED | Noted: 2024-05-03 | Resolved: 2025-07-11

## 2025-07-11 ASSESSMENT — LIFESTYLE VARIABLES
HOW OFTEN DURING THE LAST YEAR HAVE YOU BEEN UNABLE TO REMEMBER WHAT HAPPENED THE NIGHT BEFORE BECAUSE YOU HAD BEEN DRINKING: NEVER
HAS A RELATIVE, FRIEND, DOCTOR, OR ANOTHER HEALTH PROFESSIONAL EXPRESSED CONCERN ABOUT YOUR DRINKING OR SUGGESTED YOU CUT DOWN: NO
HAVE YOU OR SOMEONE ELSE BEEN INJURED AS A RESULT OF YOUR DRINKING: NO
HOW MANY STANDARD DRINKS CONTAINING ALCOHOL DO YOU HAVE ON A TYPICAL DAY: 3 OR 4
HOW OFTEN DURING THE LAST YEAR HAVE YOU FAILED TO DO WHAT WAS NORMALLY EXPECTED FROM YOU BECAUSE OF DRINKING: NEVER
HOW OFTEN DURING THE LAST YEAR HAVE YOU FOUND THAT YOU WERE NOT ABLE TO STOP DRINKING ONCE YOU HAD STARTED: NEVER
HOW OFTEN DO YOU HAVE A DRINK CONTAINING ALCOHOL: 2-3 TIMES A WEEK
HOW OFTEN DURING THE LAST YEAR HAVE YOU HAD A FEELING OF GUILT OR REMORSE AFTER DRINKING: NEVER
HOW OFTEN DURING THE LAST YEAR HAVE YOU NEEDED AN ALCOHOLIC DRINK FIRST THING IN THE MORNING TO GET YOURSELF GOING AFTER A NIGHT OF HEAVY DRINKING: NEVER

## 2025-07-11 ASSESSMENT — PATIENT HEALTH QUESTIONNAIRE - PHQ9
1. LITTLE INTEREST OR PLEASURE IN DOING THINGS: SEVERAL DAYS
10. IF YOU CHECKED OFF ANY PROBLEMS, HOW DIFFICULT HAVE THESE PROBLEMS MADE IT FOR YOU TO DO YOUR WORK, TAKE CARE OF THINGS AT HOME, OR GET ALONG WITH OTHER PEOPLE: SOMEWHAT DIFFICULT
3. TROUBLE FALLING OR STAYING ASLEEP: NOT AT ALL
5. POOR APPETITE OR OVEREATING: SEVERAL DAYS
4. FEELING TIRED OR HAVING LITTLE ENERGY: NEARLY EVERY DAY
7. TROUBLE CONCENTRATING ON THINGS, SUCH AS READING THE NEWSPAPER OR WATCHING TELEVISION: NOT AT ALL
6. FEELING BAD ABOUT YOURSELF - OR THAT YOU ARE A FAILURE OR HAVE LET YOURSELF OR YOUR FAMILY DOWN: NOT AT ALL
9. THOUGHTS THAT YOU WOULD BE BETTER OFF DEAD, OR OF HURTING YOURSELF: NOT AT ALL
8. MOVING OR SPEAKING SO SLOWLY THAT OTHER PEOPLE COULD HAVE NOTICED. OR THE OPPOSITE, BEING SO FIGETY OR RESTLESS THAT YOU HAVE BEEN MOVING AROUND A LOT MORE THAN USUAL: NOT AT ALL
SUM OF ALL RESPONSES TO PHQ QUESTIONS 1-9: 8
2. FEELING DOWN, DEPRESSED OR HOPELESS: NEARLY EVERY DAY

## 2025-07-11 ASSESSMENT — COLUMBIA-SUICIDE SEVERITY RATING SCALE - C-SSRS
2. HAVE YOU ACTUALLY HAD ANY THOUGHTS OF KILLING YOURSELF?: NO
1. WITHIN THE PAST MONTH, HAVE YOU WISHED YOU WERE DEAD OR WISHED YOU COULD GO TO SLEEP AND NOT WAKE UP?: NO
6. HAVE YOU EVER DONE ANYTHING, STARTED TO DO ANYTHING, OR PREPARED TO DO ANYTHING TO END YOUR LIFE?: NO

## 2025-07-11 NOTE — PROGRESS NOTES
Medicare Annual Wellness Visit    Tulio Wilson is here for Medicare AWV    Assessment & Plan  1. Abdominal hernia.  - The presence of an abdominal hernia is confirmed upon physical examination.  - The hernia is not currently causing pain.  - A CT scan of the abdomen and pelvis will be ordered to assess the size and contents of the hernia.  - If the hernia is found to contain bowel or is significantly large, surgical intervention may be considered.    2. Bowel irregularities.  - Reports waking up in the middle of the night to have a bowel movement, which is usually very soft, and sometimes experiencing incontinence at night.  - This has been ongoing for over a year.  - The CT scan of the abdomen and pelvis will help determine if the hernia is influencing these bowel changes.  - No current constipation reported, but bowel movements are irregular.    3. Back pain.  - Continues to experience back pain.  - Has not followed through with recommended therapy.  - Prefers to live with the pain rather than pursue further treatment at this time.  - No recent injections or significant changes in management.    4. Shaw's esophagus.  - Has a history of Shaw's esophagus and has been on Protonix for a long time.  - The condition is stable.  - There is no indication that Shaw's esophagus is contributing to the current symptoms.  - Recent EGD and H. pylori tests were negative.    5. Health maintenance.  - Lab work will be ordered to check vitamin D levels and PSA.  - A mammogram will also be scheduled.  - He will be contacted to arrange these tests.  - Follow-up appointment scheduled for 4 months to monitor progress and plan further management.    Medicare annual wellness visit, subsequent  Personal history of tobacco use  -     [place] VISIT TO DISCUSS LUNG CA SCREEN W LDCT  Abdominal mass, unspecified abdominal location  -     CT ABDOMEN PELVIS WO CONTRAST Additional Contrast? None; Future  Pure hypercholesterolemia  -

## 2025-07-14 DIAGNOSIS — Z12.5 SCREENING PSA (PROSTATE SPECIFIC ANTIGEN): ICD-10-CM

## 2025-07-14 DIAGNOSIS — I10 ESSENTIAL HYPERTENSION: Chronic | ICD-10-CM

## 2025-07-14 DIAGNOSIS — E78.00 PURE HYPERCHOLESTEROLEMIA: ICD-10-CM

## 2025-07-14 DIAGNOSIS — N18.30 STAGE 3 CHRONIC KIDNEY DISEASE, UNSPECIFIED WHETHER STAGE 3A OR 3B CKD (HCC): ICD-10-CM

## 2025-07-14 LAB
ALBUMIN SERPL-MCNC: 4.5 G/DL (ref 3.5–4.6)
ALP SERPL-CCNC: 71 U/L (ref 35–104)
ALT SERPL-CCNC: 13 U/L (ref 0–41)
ANION GAP SERPL CALCULATED.3IONS-SCNC: 8 MEQ/L (ref 9–15)
AST SERPL-CCNC: 21 U/L (ref 0–40)
BILIRUB SERPL-MCNC: 1.4 MG/DL (ref 0.2–0.7)
BUN SERPL-MCNC: 18 MG/DL (ref 8–23)
CALCIUM SERPL-MCNC: 9.5 MG/DL (ref 8.5–9.9)
CHLORIDE SERPL-SCNC: 105 MEQ/L (ref 95–107)
CHOLEST SERPL-MCNC: 131 MG/DL (ref 0–199)
CO2 SERPL-SCNC: 28 MEQ/L (ref 20–31)
CREAT SERPL-MCNC: 1.09 MG/DL (ref 0.7–1.2)
ERYTHROCYTE [DISTWIDTH] IN BLOOD BY AUTOMATED COUNT: 13.2 % (ref 11.5–14.5)
GLOBULIN SER CALC-MCNC: 2.4 G/DL (ref 2.3–3.5)
GLUCOSE SERPL-MCNC: 102 MG/DL (ref 70–99)
HCT VFR BLD AUTO: 43.8 % (ref 42–52)
HDLC SERPL-MCNC: 48 MG/DL (ref 40–59)
HGB BLD-MCNC: 14.9 G/DL (ref 14–18)
LDL CHOLESTEROL: 55 MG/DL (ref 0–129)
MCH RBC QN AUTO: 30.7 PG (ref 27–31.3)
MCHC RBC AUTO-ENTMCNC: 34 % (ref 33–37)
MCV RBC AUTO: 90.3 FL (ref 79–92.2)
PLATELET # BLD AUTO: 124 K/UL (ref 130–400)
POTASSIUM SERPL-SCNC: 4.9 MEQ/L (ref 3.4–4.9)
PROT SERPL-MCNC: 6.9 G/DL (ref 6.3–8)
PSA SERPL-MCNC: <0.02 NG/ML (ref 0–4)
RBC # BLD AUTO: 4.85 M/UL (ref 4.7–6.1)
SODIUM SERPL-SCNC: 141 MEQ/L (ref 135–144)
TRIGLYCERIDE, FASTING: 140 MG/DL (ref 0–150)
VITAMIN D 25-HYDROXY: 34 NG/ML (ref 30–100)
WBC # BLD AUTO: 4 K/UL (ref 4.8–10.8)

## 2025-07-21 ENCOUNTER — HOSPITAL ENCOUNTER (OUTPATIENT)
Dept: CT IMAGING | Age: 80
Discharge: HOME | End: 2025-07-23
Payer: MEDICARE

## 2025-07-21 DIAGNOSIS — R19.00 ABDOMINAL MASS, UNSPECIFIED ABDOMINAL LOCATION: ICD-10-CM

## 2025-07-21 PROCEDURE — 74176 CT ABD & PELVIS W/O CONTRAST: CPT

## 2025-08-07 ENCOUNTER — OFFICE VISIT (OUTPATIENT)
Age: 80
End: 2025-08-07
Payer: MEDICARE

## 2025-08-07 VITALS
TEMPERATURE: 97.8 F | WEIGHT: 197 LBS | SYSTOLIC BLOOD PRESSURE: 126 MMHG | DIASTOLIC BLOOD PRESSURE: 82 MMHG | BODY MASS INDEX: 33.63 KG/M2 | OXYGEN SATURATION: 97 % | HEIGHT: 64 IN | HEART RATE: 50 BPM

## 2025-08-07 DIAGNOSIS — D22.9 ATYPICAL MOLE: ICD-10-CM

## 2025-08-07 DIAGNOSIS — R22.2 ABDOMINAL WALL MASS: Primary | ICD-10-CM

## 2025-08-07 DIAGNOSIS — K80.20 GALLSTONES: ICD-10-CM

## 2025-08-07 PROCEDURE — 99204 OFFICE O/P NEW MOD 45 MIN: CPT | Performed by: SURGERY

## 2025-08-07 PROCEDURE — 1123F ACP DISCUSS/DSCN MKR DOCD: CPT | Performed by: SURGERY

## 2025-08-07 PROCEDURE — 99203 OFFICE O/P NEW LOW 30 MIN: CPT | Performed by: SURGERY

## 2025-08-07 PROCEDURE — 3074F SYST BP LT 130 MM HG: CPT | Performed by: SURGERY

## 2025-08-07 PROCEDURE — G8427 DOCREV CUR MEDS BY ELIG CLIN: HCPCS | Performed by: SURGERY

## 2025-08-07 PROCEDURE — G8417 CALC BMI ABV UP PARAM F/U: HCPCS | Performed by: SURGERY

## 2025-08-07 PROCEDURE — 1159F MED LIST DOCD IN RCRD: CPT | Performed by: SURGERY

## 2025-08-07 PROCEDURE — 3079F DIAST BP 80-89 MM HG: CPT | Performed by: SURGERY

## 2025-08-07 PROCEDURE — 1036F TOBACCO NON-USER: CPT | Performed by: SURGERY

## 2025-08-14 PROBLEM — K80.20 GALLSTONES: Status: ACTIVE | Noted: 2025-08-14

## 2025-08-15 DIAGNOSIS — L98.8 SKIN MACULE: Primary | ICD-10-CM

## 2025-08-18 ENCOUNTER — PROCEDURE VISIT (OUTPATIENT)
Age: 80
End: 2025-08-18
Payer: MEDICARE

## 2025-08-18 VITALS
BODY MASS INDEX: 33.12 KG/M2 | TEMPERATURE: 98.7 F | DIASTOLIC BLOOD PRESSURE: 74 MMHG | OXYGEN SATURATION: 96 % | SYSTOLIC BLOOD PRESSURE: 128 MMHG | HEIGHT: 64 IN | HEART RATE: 58 BPM | WEIGHT: 194 LBS

## 2025-08-18 DIAGNOSIS — R22.2 ABDOMINAL WALL MASS: Primary | ICD-10-CM

## 2025-08-18 DIAGNOSIS — R19.09 UMBILICAL MASS: ICD-10-CM

## 2025-08-18 PROCEDURE — 11404 EXC TR-EXT B9+MARG 3.1-4 CM: CPT | Performed by: SURGERY

## 2025-08-18 PROCEDURE — 11403 EXC TR-EXT B9+MARG 2.1-3CM: CPT | Performed by: SURGERY

## 2025-08-18 SDOH — HEALTH STABILITY: PHYSICAL HEALTH: ON AVERAGE, HOW MANY MINUTES DO YOU ENGAGE IN EXERCISE AT THIS LEVEL?: 30 MIN

## 2025-08-18 SDOH — HEALTH STABILITY: PHYSICAL HEALTH: ON AVERAGE, HOW MANY DAYS PER WEEK DO YOU ENGAGE IN MODERATE TO STRENUOUS EXERCISE (LIKE A BRISK WALK)?: 5 DAYS

## 2025-08-21 ENCOUNTER — OFFICE VISIT (OUTPATIENT)
Dept: FAMILY MEDICINE CLINIC | Age: 80
End: 2025-08-21

## 2025-08-21 VITALS — WEIGHT: 198 LBS | HEART RATE: 72 BPM | HEIGHT: 64 IN | BODY MASS INDEX: 33.8 KG/M2 | OXYGEN SATURATION: 98 %

## 2025-08-21 DIAGNOSIS — L82.1 SEBORRHEIC KERATOSES: ICD-10-CM

## 2025-08-21 DIAGNOSIS — Z12.83 SKIN CANCER SCREENING: Primary | ICD-10-CM

## 2025-08-21 ASSESSMENT — ENCOUNTER SYMPTOMS: COLOR CHANGE: 1

## 2025-08-27 ENCOUNTER — OFFICE VISIT (OUTPATIENT)
Age: 80
End: 2025-08-27
Payer: MEDICARE

## 2025-08-27 VITALS
TEMPERATURE: 97.5 F | HEIGHT: 64 IN | WEIGHT: 192 LBS | HEART RATE: 54 BPM | BODY MASS INDEX: 32.78 KG/M2 | SYSTOLIC BLOOD PRESSURE: 120 MMHG | OXYGEN SATURATION: 98 % | DIASTOLIC BLOOD PRESSURE: 82 MMHG

## 2025-08-27 DIAGNOSIS — K80.20 SYMPTOMATIC CHOLELITHIASIS: Primary | ICD-10-CM

## 2025-08-27 DIAGNOSIS — Z51.89 VISIT FOR WOUND CHECK: ICD-10-CM

## 2025-08-27 PROCEDURE — 1159F MED LIST DOCD IN RCRD: CPT | Performed by: SURGERY

## 2025-08-27 PROCEDURE — 99213 OFFICE O/P EST LOW 20 MIN: CPT | Performed by: SURGERY

## 2025-08-27 PROCEDURE — 99214 OFFICE O/P EST MOD 30 MIN: CPT | Performed by: SURGERY

## 2025-08-27 PROCEDURE — 3074F SYST BP LT 130 MM HG: CPT | Performed by: SURGERY

## 2025-08-27 PROCEDURE — 1123F ACP DISCUSS/DSCN MKR DOCD: CPT | Performed by: SURGERY

## 2025-08-27 PROCEDURE — 1125F AMNT PAIN NOTED PAIN PRSNT: CPT | Performed by: SURGERY

## 2025-08-27 PROCEDURE — G8427 DOCREV CUR MEDS BY ELIG CLIN: HCPCS | Performed by: SURGERY

## 2025-08-27 PROCEDURE — 1036F TOBACCO NON-USER: CPT | Performed by: SURGERY

## 2025-08-27 PROCEDURE — G8417 CALC BMI ABV UP PARAM F/U: HCPCS | Performed by: SURGERY

## 2025-08-27 PROCEDURE — 3079F DIAST BP 80-89 MM HG: CPT | Performed by: SURGERY

## 2025-08-27 RX ORDER — SODIUM CHLORIDE 0.9 % (FLUSH) 0.9 %
5-40 SYRINGE (ML) INJECTION EVERY 12 HOURS SCHEDULED
OUTPATIENT
Start: 2025-08-27

## 2025-08-27 RX ORDER — SODIUM CHLORIDE 0.9 % (FLUSH) 0.9 %
5-40 SYRINGE (ML) INJECTION PRN
OUTPATIENT
Start: 2025-08-27

## 2025-08-27 RX ORDER — SODIUM CHLORIDE 9 MG/ML
INJECTION, SOLUTION INTRAVENOUS PRN
OUTPATIENT
Start: 2025-08-27

## (undated) DEVICE — FORCEPS BX L240CM JAW DIA2.8MM L CAP W/ NDL MIC MESH TOOTH

## (undated) DEVICE — CONMED SCOPE SAVER BITE BLOCK, 20X27 MM: Brand: SCOPE SAVER

## (undated) DEVICE — TUBE SET 96 MM 64 MM H2O PERISTALTIC STD AUX CHANNEL

## (undated) DEVICE — SINGLE PORT MANIFOLD: Brand: NEPTUNE 2

## (undated) DEVICE — BRUSH ENDO CLN L90.5IN SHTH DIA1.7MM BRIST DIA5-7MM 2-6MM

## (undated) DEVICE — ENDO CARRY-ON PROCEDURE KIT: Brand: ENDO CARRY-ON PROCEDURE KIT

## (undated) DEVICE — SNARE ENDOSCP AD L240CM LOOP W10MM SHTH DIA2.4MM RND INSUL

## (undated) DEVICE — Device: Brand: ENDO SMARTCAP

## (undated) DEVICE — TUBING, SUCTION, 1/4" X 10', STRAIGHT: Brand: MEDLINE

## (undated) DEVICE — TRAP POLYP BALEEN

## (undated) DEVICE — FORCEPS BX L240CM JAW DIA2.4MM ORNG L CAP W/ NDL DISP RAD

## (undated) DEVICE — ADAPTER FLSH PMP FLD MGMT GI IRRIG OFP 2 DISPOSABLE